# Patient Record
Sex: FEMALE | URBAN - METROPOLITAN AREA
[De-identification: names, ages, dates, MRNs, and addresses within clinical notes are randomized per-mention and may not be internally consistent; named-entity substitution may affect disease eponyms.]

---

## 2021-06-04 ENCOUNTER — TELEPHONE (OUTPATIENT)
Dept: ADMINISTRATIVE | Age: 71
End: 2021-06-04

## 2021-06-04 ENCOUNTER — NURSE TRIAGE (OUTPATIENT)
Dept: OTHER | Facility: CLINIC | Age: 71
End: 2021-06-04

## 2021-06-04 NOTE — TELEPHONE ENCOUNTER
[facility] [place] [id number] [facility]. Reason for Disposition   Information only question and nurse able to answer    Answer Assessment - Initial Assessment Questions  1. REASON FOR CALL or QUESTION: \"What is your reason for calling today? \" or \"How can I best help you? \" or \"What question do you have that I can help answer? \"      Pt calling stating she wanted to talk to a triage nurse regarding multiple chief complaints. She was provided this number by another health care provider. Pt does not have a PCP. When pt was stating her s/s she was concerned and wanted to be triaged for, pt was provided to symptoms of covid per cdc website:, fever or chills, Cough, Shortness of breath or difficulty breathing, Fatigue, muscle or body aches, Headache, New loss of taste or smell, Sore throat, Congestion or runny nose, Nausea or vomiting, Diarrhea. Pt states she lives in an isolated area and has been vaccinated and she has s/s more than list provided. Suggested pt to go to urgent care or walk in clinic for s/s if she is ill to be evaluated if she does not have a PCP and needs to be treated.     Protocols used: INFORMATION ONLY CALL - NO TRIAGE-ADULT-[place]

## 2021-06-04 NOTE — TELEPHONE ENCOUNTER
Pt called to establish as a new patient with Dr. Azul Soni. She is having pain in ears, along neck and jaw, worse this morning. [facility], but called her back and transferred her to our nurse triage line for assessment. Are you willing to take her on as a new patient? Please advise.

## 2021-06-07 ENCOUNTER — HOSPITAL ENCOUNTER (INPATIENT)
Age: 71
LOS: 10 days | Discharge: NURSING, REHAB OR LONG-TERM CARE | DRG: 233 | End: 2021-06-17
Attending: FAMILY MEDICINE | Admitting: THORACIC SURGERY (CARDIOTHORACIC VASCULAR SURGERY)
Payer: MEDICARE

## 2021-06-07 DIAGNOSIS — I25.10 CAD IN NATIVE ARTERY: ICD-10-CM

## 2021-06-07 DIAGNOSIS — Z95.1 S/P CABG (CORONARY ARTERY BYPASS GRAFT): ICD-10-CM

## 2021-06-07 DIAGNOSIS — G89.18 ACUTE POSTOPERATIVE PAIN: Primary | ICD-10-CM

## 2021-06-07 LAB
ABO/RH: NORMAL
ANTIBODY SCREEN: NORMAL
LV EF: 55 %
LVEF MODALITY: NORMAL

## 2021-06-07 PROCEDURE — B2111ZZ FLUOROSCOPY OF MULTIPLE CORONARY ARTERIES USING LOW OSMOLAR CONTRAST: ICD-10-PCS | Performed by: INTERNAL MEDICINE

## 2021-06-07 PROCEDURE — 2709999900 HC NON-CHARGEABLE SUPPLY

## 2021-06-07 PROCEDURE — P9016 RBC LEUKOCYTES REDUCED: HCPCS

## 2021-06-07 PROCEDURE — 86901 BLOOD TYPING SEROLOGIC RH(D): CPT

## 2021-06-07 PROCEDURE — 93458 L HRT ARTERY/VENTRICLE ANGIO: CPT | Performed by: INTERNAL MEDICINE

## 2021-06-07 PROCEDURE — 4A023N7 MEASUREMENT OF CARDIAC SAMPLING AND PRESSURE, LEFT HEART, PERCUTANEOUS APPROACH: ICD-10-PCS | Performed by: INTERNAL MEDICINE

## 2021-06-07 PROCEDURE — P9059 PLASMA, FRZ BETWEEN 8-24HOUR: HCPCS

## 2021-06-07 PROCEDURE — B2151ZZ FLUOROSCOPY OF LEFT HEART USING LOW OSMOLAR CONTRAST: ICD-10-PCS | Performed by: INTERNAL MEDICINE

## 2021-06-07 PROCEDURE — 86900 BLOOD TYPING SEROLOGIC ABO: CPT

## 2021-06-07 PROCEDURE — 2140000000 HC CCU INTERMEDIATE R&B

## 2021-06-07 PROCEDURE — C1894 INTRO/SHEATH, NON-LASER: HCPCS

## 2021-06-07 PROCEDURE — 36415 COLL VENOUS BLD VENIPUNCTURE: CPT

## 2021-06-07 PROCEDURE — 86923 COMPATIBILITY TEST ELECTRIC: CPT

## 2021-06-07 PROCEDURE — P9035 PLATELET PHERES LEUKOREDUCED: HCPCS

## 2021-06-07 PROCEDURE — 6360000002 HC RX W HCPCS

## 2021-06-07 PROCEDURE — 2500000003 HC RX 250 WO HCPCS

## 2021-06-07 PROCEDURE — 2580000003 HC RX 258: Performed by: FAMILY MEDICINE

## 2021-06-07 PROCEDURE — 86850 RBC ANTIBODY SCREEN: CPT

## 2021-06-07 PROCEDURE — C1769 GUIDE WIRE: HCPCS

## 2021-06-07 PROCEDURE — C1887 CATHETER, GUIDING: HCPCS

## 2021-06-07 PROCEDURE — 6370000000 HC RX 637 (ALT 250 FOR IP): Performed by: INTERNAL MEDICINE

## 2021-06-07 PROCEDURE — 2580000003 HC RX 258: Performed by: INTERNAL MEDICINE

## 2021-06-07 RX ORDER — SODIUM CHLORIDE 9 MG/ML
INJECTION, SOLUTION INTRAVENOUS ONCE
Status: COMPLETED | OUTPATIENT
Start: 2021-06-07 | End: 2021-06-07

## 2021-06-07 RX ORDER — ASPIRIN 81 MG/1
81 TABLET, CHEWABLE ORAL DAILY
Status: DISCONTINUED | OUTPATIENT
Start: 2021-06-08 | End: 2021-06-09

## 2021-06-07 RX ORDER — FAMOTIDINE 20 MG/1
20 TABLET, FILM COATED ORAL 2 TIMES DAILY
COMMUNITY
End: 2021-07-27 | Stop reason: SDUPTHER

## 2021-06-07 RX ORDER — ACETAMINOPHEN 325 MG/1
650 TABLET ORAL EVERY 4 HOURS PRN
Status: DISCONTINUED | OUTPATIENT
Start: 2021-06-07 | End: 2021-06-09

## 2021-06-07 RX ORDER — ATORVASTATIN CALCIUM 80 MG/1
80 TABLET, FILM COATED ORAL NIGHTLY
Status: DISCONTINUED | OUTPATIENT
Start: 2021-06-07 | End: 2021-06-14

## 2021-06-07 RX ORDER — MECOBALAMIN 5000 MCG
5 TABLET,DISINTEGRATING ORAL NIGHTLY PRN
Status: DISCONTINUED | OUTPATIENT
Start: 2021-06-07 | End: 2021-06-09

## 2021-06-07 RX ORDER — ASPIRIN 81 MG/1
81 TABLET, CHEWABLE ORAL DAILY
COMMUNITY

## 2021-06-07 RX ORDER — IBUPROFEN 200 MG
200 TABLET ORAL EVERY 6 HOURS PRN
COMMUNITY
End: 2021-07-27

## 2021-06-07 RX ORDER — SODIUM CHLORIDE 9 MG/ML
INJECTION, SOLUTION INTRAVENOUS CONTINUOUS
Status: ACTIVE | OUTPATIENT
Start: 2021-06-07 | End: 2021-06-08

## 2021-06-07 RX ORDER — ATORVASTATIN CALCIUM 20 MG/1
80 TABLET, FILM COATED ORAL DAILY
COMMUNITY
End: 2021-07-27 | Stop reason: SDUPTHER

## 2021-06-07 RX ORDER — FAMOTIDINE 20 MG/1
20 TABLET, FILM COATED ORAL 2 TIMES DAILY
Status: DISCONTINUED | OUTPATIENT
Start: 2021-06-07 | End: 2021-06-09

## 2021-06-07 RX ADMIN — SODIUM CHLORIDE: 9 INJECTION, SOLUTION INTRAVENOUS at 11:05

## 2021-06-07 RX ADMIN — METOPROLOL TARTRATE 25 MG: 25 TABLET, FILM COATED ORAL at 21:04

## 2021-06-07 RX ADMIN — ATORVASTATIN CALCIUM 80 MG: 80 TABLET, FILM COATED ORAL at 21:04

## 2021-06-07 RX ADMIN — FAMOTIDINE 20 MG: 20 TABLET ORAL at 21:04

## 2021-06-07 RX ADMIN — ACETAMINOPHEN 650 MG: 325 TABLET ORAL at 17:53

## 2021-06-07 RX ADMIN — SODIUM CHLORIDE: 9 INJECTION, SOLUTION INTRAVENOUS at 17:39

## 2021-06-07 RX ADMIN — SODIUM CHLORIDE: 9 INJECTION, SOLUTION INTRAVENOUS at 11:04

## 2021-06-07 ASSESSMENT — PAIN SCALES - GENERAL
PAINLEVEL_OUTOF10: 0
PAINLEVEL_OUTOF10: 3
PAINLEVEL_OUTOF10: 0

## 2021-06-07 NOTE — H&P
Hospitalist History & Physical      PCP: No primary care provider on file. Date of Admission: 6/7/2021    Date of Service: Pt seen/examined on 6/7/2021 and is  admitted to Inpatient with expected LOS greater than two midnights due to medical therapy. Chief Complaint: Exertional chest pain    History Of Present Illness:    Ms. Librado Ewing, a 78y.o. year old female  who  has a past medical history of CAD in native artery, COPD (chronic obstructive pulmonary disease) ([address] Hyperlipidemia, and Hypertension. Briefly this is a 70-year-old female who has been admitted status post cardiac catheterization at [facility]. She initially presented the morning of 6/5/2021 with complaints of exertional chest pain with radiation to neck/jaw which typically stopped after cessation of exertion. She had a 1 month history of this but it was more frequent and prolonged. There is no associated nausea, vomiting, diaphoresis. There is no recurrences of this since admission however her troponin was 1.6 at baseline and peaked at 3.1. Initial EKG showed sinus rhythm with subtle anterior and lateral ST depressions. Patient was admitted there and was transferred to [facility] for further evaluation and management. Patient had a cardiac cath by Dr. Palma Albrecht which reveals multivessel CAD that needs consideration for surgical bypass. Patient is being admitted for further evaluation. Patient reports she was a smoker but has not been smoking for the last 20 years. Patient is compliant with medical follow-up but has a history of hypertension, hyperlipidemia and COPD -  she reports that she takes Motrin pretty frequently for arthritis pain in her back and her knees. Patient's admission lab from 6/5 reveals normal CBC count, normal BMP. Lipid panel significant for LDL of 161.     Past Medical History:   Diagnosis Date    CAD in native artery 6/7/2021    COPD (chronic obstructive pulmonary disease) (HCC)     Hyperlipidemia     Hypertension        No past surgical history on file. Prior to Admission medications    Medication Sig Start Date End Date Taking? Authorizing Provider   atorvastatin (LIPITOR) 20 MG tablet Take 80 mg by mouth daily    Yes Historical Provider, MD   aspirin 81 MG chewable tablet Take 81 mg by mouth daily   Yes Historical Provider, MD   metoprolol tartrate (LOPRESSOR) 25 MG tablet Take 25 mg by mouth 2 times daily   Yes Historical Provider, MD   famotidine (PEPCID) 20 MG tablet Take 20 mg by mouth 2 times daily   Yes Historical Provider, MD   nitroglycerin (NITRO-BID) 2 % ointment Place 0.5 inches onto the skin every 6 hours   Yes Historical Provider, MD   ibuprofen (ADVIL;MOTRIN) 200 MG tablet Take 200 mg by mouth every 6 hours as needed for Pain (hips/back)   Yes Historical Provider, MD         Allergies:  Tramadol    Social History:    TOBACCO:   has no history on file for tobacco use. ETOH:   has no history on file for alcohol use. Family History:    Reviewed in detail and negative for DM, CAD, Cancer, CVA. Positive as follows\"  No family history on file. REVIEW OF SYSTEMS:   Pertinent positives as noted in the HPI. All other systems reviewed and negative. PHYSICAL EXAM:  BP (!) 151/83   Pulse 83   Temp 98 °F (36.7 °C) (Temporal)   Resp 16   Ht 5' 1.5\" (1.562 m)   Wt 105 lb (47.6 kg)   SpO2 96%   BMI 19.52 kg/m²   General appearance: No apparent distress, appears stated age and cooperative. HEENT: Normal cephalic, atraumatic without obvious deformity. Pupils equal, round, and reactive to light. Extra ocular muscles intact. Conjunctivae/corneas clear. Neck: Supple, with full range of motion. No jugular venous distention. Trachea midline. Respiratory:    Cardiovascular:    Abdomen:    Musculoskeletal: No clubbing, cyanosis, edema of bilateral lower extremities. Brisk capillary refill. Skin: Normal skin color.   No rashes or lesions. Neurologic:  Neurovascularly intact without any focal sensory/motor deficits. Cranial nerves: II-XII intact, grossly non-focal.  Psychiatric: Alert and oriented, thought content appropriate, normal insight    Reviewed EKG and CXR personally      CBC:   No results for input(s): WBC, RBC, HGB, HCT, MCV, RDW, PLT in the last 72 hours. BMP: No results for input(s): NA, K, CL, CO2, BUN, CREATININE, MG, PHOS in the last 72 hours. Invalid input(s): CA  LFT:  No results for input(s): PROT, ALB, ALKPHOS, ALT, AST, BILITOT, AMYLASE, LIPASE in the last 72 hours. CE:  No results for input(s): Eleni Lemme in the last 72 hours. PT/INR: No results for input(s): INR, APTT in the last 72 hours. BNP: No results for input(s): BNP in the last 72 hours. ESR: No results found for: SEDRATE  CRP: No results found for: CRP  D Dimer: No results found for: DDIMER   Folate and B12: No results found for: [id number], No results found for: FOLATE  Lactic Acid: No results found for: LACTA  Thyroid Studies: No results found for: TSH, U6GNJTE, M7BWNMT, THYROIDAB    Oupatient labs:  No results found for: CHOL, TRIG, HDL, LDLCALC, TSH, PSA, INR, LABA1C    Urinalysis:  No results found for: NITRU, WBCUA, BACTERIA, RBCUA, BLOODU, SPECGRAV, GLUCOSEU    Imaging:  No results found. ASSESSMENT:    Exertional chest pain  Multivessel CAD  Hypertension, hyperlipidemia  Extobacco smoker      PLAN:    Admit for further evaluation management  Check A1c. LDL reviewed from [place]. Consult was placed to CT surgery by interventionalist.  Patient would like to follow-up with [facility] cardiology. I conveyed this to [facility] cardiology personally. Light IV hydration for the next 24 hours. Check morning BMP  Antiplatelet, statin, beta-blocker      Diet: ADULT DIET; Regular;  No Added Salt (3-4 gm)  Code Status: Full Code  Surrogate decision maker confirmed with patient:   Extended Emergency Contact Information  Primary Emergency Contact: Cruce [address] Phone: [phone]  Relation: Spouse    DVT Prophylaxis: []Lovenox []Heparin []PCD [] [address] []Encouraged ambulation  Disposition: []Med/Surg [x] Intermediate [] ICU/CCU  Admit status: [] Observation [x] Inpatient     +++++++++++++++++++++++++++++++++++++++++++++++++  Velma Sauceda MD  [address]  +++++++++++++++++++++++++++++++++++++++++++++++++  NOTE: This report was transcribed using voice recognition software. Every effort was made to ensure accuracy; however, inadvertent computerized transcription errors may be present.

## 2021-06-07 NOTE — PROCEDURES
Procedure:    1. Left heart cath    Physician: Anthony Gerardo DO. Assistant: none    Indication: NSTEmi  AUC: 8  AUC indication: 4    Complication: None. Sedation: Intravenous Versed. Anesthesia: Xylocaine, fentanyl     Sedation time: I was present for sedation and ministration at 1217 and I ended sedation at 1241 for a total face-to-face sedation time of 24 minutes. Estimated blood loss: Minimal    Specimens: none    Contrast used: 52 cc    Hemodynamics:  Opening Aortic pressure: 783/29  LV systolic pressure: 98  LVEDP: 10  No significant gradient across the aortic valve    Angiographic Results/findings:  Left Main: Distal diffuse 50%. Moderately calcified. LAD: Severely tortuous. Ostial 75 to 80%, mid 50%  Cx: Severely tortuous. Proximal to mid diffuse 80%, distal diffuse 75 to 80%  OM1: Ostial 90%  OM2: Very small vessel. Ramus: Bifurcating vessel. Ostial to proximal diffuse 80%. Proximal mid diffuse 40%. There is an anterior branch ostial 70% and proximal diffuse 75%. The lateral branch bifurcation has an ostial 95% stenosis. RCA: Dominant. Heavily calcified. Tortuous. Mid diffuse 85 to 90%, distal bifurcation subtotal chronic occlusion  PDA: Ostial subtotal occlusion as part of the bifurcation subtotal occlusion. PLB: Ostial 95% as part of the bifurcation lesion. LV: Proximal inferior hypokinesis. Ejection fraction 55%    Procedure:   After obtaining informed consent the patient was taken to the cardiac Cath Lab where the area over the right radial artery was prepped and draped in a sterile fashion. Using ultrasound guidance and a micropuncture technique a 6 Romansh slender glide sheath was placed in the right radial artery. This was aspirated & flushed several times throughout the procedure. This was medicated with verapamil and nitroglycerin. They were given heparin systemically. A 5 [place] TIG catheter was advanced over a wire to the root of the aorta.   It was aspirated &

## 2021-06-08 ENCOUNTER — APPOINTMENT (OUTPATIENT)
Dept: INTERVENTIONAL RADIOLOGY/VASCULAR | Age: 71
DRG: 233 | End: 2021-06-08
Attending: FAMILY MEDICINE
Payer: MEDICARE

## 2021-06-08 ENCOUNTER — APPOINTMENT (OUTPATIENT)
Dept: CT IMAGING | Age: 71
DRG: 233 | End: 2021-06-08
Attending: FAMILY MEDICINE
Payer: MEDICARE

## 2021-06-08 ENCOUNTER — ANESTHESIA EVENT (OUTPATIENT)
Dept: OPERATING ROOM | Age: 71
DRG: 233 | End: 2021-06-08
Payer: MEDICARE

## 2021-06-08 ENCOUNTER — APPOINTMENT (OUTPATIENT)
Dept: ULTRASOUND IMAGING | Age: 71
DRG: 233 | End: 2021-06-08
Attending: FAMILY MEDICINE
Payer: MEDICARE

## 2021-06-08 LAB
ALBUMIN SERPL-MCNC: 4.2 G/DL (ref 3.5–5.2)
ALP BLD-CCNC: 130 U/L (ref 35–104)
ALT SERPL-CCNC: 73 U/L (ref 0–32)
ANION GAP SERPL CALCULATED.3IONS-SCNC: 11 MMOL/L (ref 7–16)
ANION GAP SERPL CALCULATED.3IONS-SCNC: 16 MMOL/L (ref 7–16)
APTT: 23.3 SEC (ref 24.5–35.1)
AST SERPL-CCNC: 82 U/L (ref 0–31)
BACTERIA: NORMAL /HPF
BILIRUB SERPL-MCNC: 0.4 MG/DL (ref 0–1.2)
BILIRUBIN URINE: NEGATIVE
BLOOD, URINE: NORMAL
BUN BLDV-MCNC: 14 MG/DL (ref 6–23)
BUN BLDV-MCNC: 15 MG/DL (ref 6–23)
CALCIUM SERPL-MCNC: 9.3 MG/DL (ref 8.6–10.2)
CALCIUM SERPL-MCNC: 9.5 MG/DL (ref 8.6–10.2)
CHLORIDE BLD-SCNC: 104 MMOL/L (ref 98–107)
CHLORIDE BLD-SCNC: 107 MMOL/L (ref 98–107)
CLARITY: CLEAR
CO2: 20 MMOL/L (ref 22–29)
CO2: 22 MMOL/L (ref 22–29)
COLOR: YELLOW
CREAT SERPL-MCNC: 0.8 MG/DL (ref 0.5–1)
CREAT SERPL-MCNC: 0.8 MG/DL (ref 0.5–1)
GFR AFRICAN AMERICAN: >60
GFR AFRICAN AMERICAN: >60
GFR NON-AFRICAN AMERICAN: >60 ML/MIN/1.73
GFR NON-AFRICAN AMERICAN: >60 ML/MIN/1.73
GLUCOSE BLD-MCNC: 106 MG/DL (ref 74–99)
GLUCOSE BLD-MCNC: 112 MG/DL (ref 74–99)
GLUCOSE URINE: NEGATIVE MG/DL
HBA1C MFR BLD: 6 % (ref 4–5.6)
HCT VFR BLD CALC: 42.2 % (ref 34–48)
HEMOGLOBIN: 13.3 G/DL (ref 11.5–15.5)
INR BLD: 1
KETONES, URINE: NEGATIVE MG/DL
LEUKOCYTE ESTERASE, URINE: NEGATIVE
MCH RBC QN AUTO: 28.5 PG (ref 26–35)
MCHC RBC AUTO-ENTMCNC: 31.5 % (ref 32–34.5)
MCV RBC AUTO: 90.4 FL (ref 80–99.9)
NITRITE, URINE: NEGATIVE
PDW BLD-RTO: 14.1 FL (ref 11.5–15)
PH UA: 5 (ref 5–9)
PLATELET # BLD: 255 E9/L (ref 130–450)
PMV BLD AUTO: 11.5 FL (ref 7–12)
POTASSIUM REFLEX MAGNESIUM: 4.1 MMOL/L (ref 3.5–5)
POTASSIUM SERPL-SCNC: 4.2 MMOL/L (ref 3.5–5)
PROTEIN UA: NEGATIVE MG/DL
PROTHROMBIN TIME: 11 SEC (ref 9.3–12.4)
RBC # BLD: 4.67 E12/L (ref 3.5–5.5)
RBC UA: NORMAL /HPF (ref 0–2)
SODIUM BLD-SCNC: 140 MMOL/L (ref 132–146)
SODIUM BLD-SCNC: 140 MMOL/L (ref 132–146)
SPECIFIC GRAVITY UA: 1.01 (ref 1–1.03)
TOTAL PROTEIN: 7.4 G/DL (ref 6.4–8.3)
TROPONIN, HIGH SENSITIVITY: 145 NG/L (ref 0–9)
UROBILINOGEN, URINE: 0.2 E.U./DL
WBC # BLD: 11.7 E9/L (ref 4.5–11.5)
WBC UA: NORMAL /HPF (ref 0–5)

## 2021-06-08 PROCEDURE — 94375 RESPIRATORY FLOW VOLUME LOOP: CPT

## 2021-06-08 PROCEDURE — 71250 CT THORAX DX C-: CPT

## 2021-06-08 PROCEDURE — 84484 ASSAY OF TROPONIN QUANT: CPT

## 2021-06-08 PROCEDURE — 93922 UPR/L XTREMITY ART 2 LEVELS: CPT

## 2021-06-08 PROCEDURE — 80048 BASIC METABOLIC PNL TOTAL CA: CPT

## 2021-06-08 PROCEDURE — 36415 COLL VENOUS BLD VENIPUNCTURE: CPT

## 2021-06-08 PROCEDURE — 2140000000 HC CCU INTERMEDIATE R&B

## 2021-06-08 PROCEDURE — 6370000000 HC RX 637 (ALT 250 FOR IP): Performed by: INTERNAL MEDICINE

## 2021-06-08 PROCEDURE — 85730 THROMBOPLASTIN TIME PARTIAL: CPT

## 2021-06-08 PROCEDURE — 87081 CULTURE SCREEN ONLY: CPT

## 2021-06-08 PROCEDURE — 85610 PROTHROMBIN TIME: CPT

## 2021-06-08 PROCEDURE — 94729 DIFFUSING CAPACITY: CPT

## 2021-06-08 PROCEDURE — 87088 URINE BACTERIA CULTURE: CPT

## 2021-06-08 PROCEDURE — 99222 1ST HOSP IP/OBS MODERATE 55: CPT | Performed by: THORACIC SURGERY (CARDIOTHORACIC VASCULAR SURGERY)

## 2021-06-08 PROCEDURE — 85027 COMPLETE CBC AUTOMATED: CPT

## 2021-06-08 PROCEDURE — 93880 EXTRACRANIAL BILAT STUDY: CPT

## 2021-06-08 PROCEDURE — 81001 URINALYSIS AUTO W/SCOPE: CPT

## 2021-06-08 PROCEDURE — 2580000003 HC RX 258: Performed by: PHYSICIAN ASSISTANT

## 2021-06-08 PROCEDURE — 6370000000 HC RX 637 (ALT 250 FOR IP): Performed by: PHYSICIAN ASSISTANT

## 2021-06-08 PROCEDURE — 93880 EXTRACRANIAL BILAT STUDY: CPT | Performed by: RADIOLOGY

## 2021-06-08 PROCEDURE — 83036 HEMOGLOBIN GLYCOSYLATED A1C: CPT

## 2021-06-08 PROCEDURE — 80053 COMPREHEN METABOLIC PANEL: CPT

## 2021-06-08 RX ORDER — CHLORHEXIDINE GLUCONATE 0.12 MG/ML
15 RINSE ORAL ONCE
Status: COMPLETED | OUTPATIENT
Start: 2021-06-09 | End: 2021-06-09

## 2021-06-08 RX ORDER — CHLORHEXIDINE GLUCONATE 4 G/100ML
SOLUTION TOPICAL SEE ADMIN INSTRUCTIONS
Status: DISCONTINUED | OUTPATIENT
Start: 2021-06-08 | End: 2021-06-09

## 2021-06-08 RX ORDER — SODIUM CHLORIDE 9 MG/ML
25 INJECTION, SOLUTION INTRAVENOUS PRN
Status: DISCONTINUED | OUTPATIENT
Start: 2021-06-08 | End: 2021-06-09

## 2021-06-08 RX ORDER — SODIUM CHLORIDE 0.9 % (FLUSH) 0.9 %
10 SYRINGE (ML) INJECTION PRN
Status: DISCONTINUED | OUTPATIENT
Start: 2021-06-08 | End: 2021-06-09

## 2021-06-08 RX ORDER — SODIUM CHLORIDE 0.9 % (FLUSH) 0.9 %
5-40 SYRINGE (ML) INJECTION EVERY 12 HOURS SCHEDULED
Status: DISCONTINUED | OUTPATIENT
Start: 2021-06-08 | End: 2021-06-09

## 2021-06-08 RX ADMIN — FAMOTIDINE 20 MG: 20 TABLET ORAL at 20:22

## 2021-06-08 RX ADMIN — FAMOTIDINE 20 MG: 20 TABLET ORAL at 11:20

## 2021-06-08 RX ADMIN — METOPROLOL TARTRATE 25 MG: 25 TABLET, FILM COATED ORAL at 11:22

## 2021-06-08 RX ADMIN — ATORVASTATIN CALCIUM 80 MG: 80 TABLET, FILM COATED ORAL at 20:21

## 2021-06-08 RX ADMIN — METOPROLOL TARTRATE 25 MG: 25 TABLET, FILM COATED ORAL at 20:22

## 2021-06-08 RX ADMIN — MUPIROCIN: 20 OINTMENT TOPICAL at 21:13

## 2021-06-08 RX ADMIN — SODIUM CHLORIDE, PRESERVATIVE FREE 10 ML: 5 INJECTION INTRAVENOUS at 20:22

## 2021-06-08 RX ADMIN — CHLORHEXIDINE GLUCONATE: 213 SOLUTION TOPICAL at 20:22

## 2021-06-08 RX ADMIN — ASPIRIN 81 MG CHEWABLE TABLET 81 MG: 81 TABLET CHEWABLE at 11:20

## 2021-06-08 ASSESSMENT — PAIN SCALES - GENERAL
PAINLEVEL_OUTOF10: 0

## 2021-06-08 ASSESSMENT — COPD QUESTIONNAIRES: CAT_SEVERITY: MILD

## 2021-06-08 ASSESSMENT — ENCOUNTER SYMPTOMS: SHORTNESS OF BREATH: 1

## 2021-06-08 NOTE — PROGRESS NOTES
Hospitalist Progress Note      SYNOPSIS: Patient admitted on 2021 for unstable angina. Cardiac cath on  revealed multivessel CAD needing surgical revascularization. SUBJECTIVE:    Patient seen and examined  Records reviewed. Pleasant  No new complaints  Refused nitro patch due to headaches    Stable overnight. No other overnight issues reported. Temp (24hrs), Av.1 °F (36.7 °C), Min:97.8 °F (36.6 °C), Max:98.6 °F (37 °C)    DIET: ADULT DIET; Regular; No Added Salt (3-4 gm)  Diet NPO  CODE: Full Code    Intake/Output Summary (Last 24 hours) at 2021 1020  Last data filed at 2021 0844  Gross per 24 hour   Intake 1529.51 ml   Output --   Net 1529.51 ml       OBJECTIVE:    BP (!) 120/57   Pulse 84   Temp 98.6 °F (37 °C) (Temporal)   Resp 16   Ht 5' 1.5\" (1.562 m)   Wt 105 lb (47.6 kg)   SpO2 97%   BMI 19.52 kg/m²     General appearance: No apparent distress, appears stated age and cooperative. HEENT:  Conjunctivae/corneas clear. Neck: Supple. No jugular venous distention. Respiratory: Clear to auscultation bilaterally, normal respiratory effort  Cardiovascular: Regular rate rhythm, normal S1-S2  Abdomen: Soft, nontender, nondistended  Musculoskeletal: No clubbing, cyanosis, no bilateral lower extremity edema. Brisk capillary refill. Skin:  No rashes  on visible skin  Neurologic: awake, alert and following commands     ASSESSMENT:    Exertional chest pain  Multivessel CAD  Hypertension, hyperlipidemia  Extobacco smoker  Prediabetes and A1c of 6.0  Hyperlipidemia         PLAN:    CT surgery consult pending  [id number] [facility] cardiology consult pending  Continue IV fluids. Follow morning BMP  Antiplatelet, statin, beta-blocker  Addressed questions and concerns about cardiothoracic surgery.   I requested some questions be directed to the surgery team.    DISPOSITION: Pending further course    Medications:  REVIEWED DAILY    Infusion Medications    sodium chloride      sodium chloride 75 mL/hr at 06/07/21 1739     Scheduled Medications    sodium chloride flush  5-40 mL Intravenous 2 times per day    ceFAZolin (ANCEF) IVPB  2,000 mg Intravenous See Admin Instructions    mupirocin   Nasal BID    [START ON 6/9/2021] chlorhexidine  15 mL Mouth/Throat Once    chlorhexidine   Topical See Admin Instructions    atorvastatin  80 mg Oral Nightly    aspirin  81 mg Oral Daily    metoprolol tartrate  25 mg Oral BID    famotidine  20 mg Oral BID    nitroglycerin  0.5 inch Topical Q6H     PRN Meds: perflutren lipid microspheres, sodium chloride flush, sodium chloride, acetaminophen, melatonin    Labs:     No results for input(s): WBC, HGB, HCT, PLT in the last 72 hours. Recent Labs     06/08/21  0547      K 4.1      CO2 22   BUN 15   CREATININE 0.8   CALCIUM 9.3       No results for input(s): PROT, ALB, ALKPHOS, ALT, AST, BILITOT, AMYLASE, LIPASE in the last 72 hours. No results for input(s): INR in the last 72 hours. No results for input(s): [place] in the last 72 hours. Chronic labs:    Lab Results   Component Value Date    LABA1C 6.0 (H) 06/08/2021       Radiology: REVIEWED DAILY    +++++++++++++++++++++++++++++++++++++++++++++++++  Mike Wyatt MD  [facility] Physician - [address] [place] [address]  +++++++++++++++++++++++++++++++++++++++++++++++++  NOTE: This report was transcribed using voice recognition software. Every effort was made to ensure accuracy; however, inadvertent computerized transcription errors may be present.

## 2021-06-08 NOTE — PROGRESS NOTES
Message left on [facility]CHO voice mail that pt is for 0700 open heart surgery 6-9-2021. Test needs to be done asap.

## 2021-06-08 NOTE — CARE COORDINATION
Met with pt and  in the room. Role of  and transition of care discussed. Pt lives with her  in a 2 story home. She is independent, drives, uses no DME ( they have a walker and SC from  after knee sx) Questions re: living will, POA and DNR answered. Offered to print paperwork for pt. Declined states she has paperwork already. She prefers to think about the information provided and will complete at home. Pt plans to return home after discharge. She has no PCP. She prefers the [facility] office. Attempted to arrange an appointment with Jered Joel in the [facility] [employer] office. Per , unable to make an apt until pt is discharged. [facility].

## 2021-06-08 NOTE — PROGRESS NOTES
[facility] [address]    Name: Madelyn Hou    Age: 78 y.o. PCP: No primary care provider on file. Date of Admission: 6/7/2021  1:23 PM    Date of Service: 6/8/2021    Chief Complaint: Follow-up for CAD    Interim History:  No new overnight cardiac complaints. Currently with no complaints of CP, SOB, palpitations, dizziness, or lightheadedness. Sent by Dr Arline Rivero for cath from [place]. Was informed yesterday she wanted to follow with [facility], but placed on my list today. Did have multivessel disease and for CABG tomorrow wti Dr Rolan Downing. No issues from cath. Telemetry personally reviewed and showed sinus      Review of Systems:   Cardiac: As per HPI  General: No fever, chills  Pulmonary: No cough, wheeze, or shortness of breath  GI: No nausea, vomiting,or abdominal pain  Neuro: No headache or confusion    Problem List:  Active Problems:    CAD in native artery  Resolved Problems:    * No resolved hospital problems. *      Past Medical History:  Past Medical History:   Diagnosis Date    CAD in native artery 6/7/2021    COPD (chronic obstructive pulmonary disease) (HCC)     Hyperlipidemia     Hypertension        Allergies:   Allergies   Allergen Reactions    Tramadol Nausea Only       Current Medications:  Current Facility-Administered Medications   Medication Dose Route Frequency Provider Last Rate Last Admin    perflutren lipid microspheres (DEFINITY) injection 1.65 mg  1.5 mL Intravenous ONCE PRN ELLIS Nevarez        sodium chloride flush 0.9 % injection 5-40 mL  5-40 mL Intravenous 2 times per day Francisco Sepulveda, [address]        sodium chloride flush 0.9 % injection 10 mL  10 mL Intravenous PRN ELLIS Medrano        0.9 % sodium chloride infusion  25 mL Intravenous PRN ELLIS Medrano        ceFAZolin (ANCEF) 2000 mg in sterile water 20 mL IV syringe  2,000 mg Intravenous See Admin Instructions ELLIS Medrano        mupirocin (BACTROBAN) 2 % ointment   Nasal BID ELLIS Rice        [START ON 6/9/2021] chlorhexidine (PERIDEX) 0.12 % solution 15 mL  15 mL Mouth/Throat Once Hal Rice        chlorhexidine (HIBICLENS) 4 % liquid   Topical See Admin Instructions Hal Rice        acetaminophen (TYLENOL) tablet 650 mg  650 mg Oral Q4H PRN Lenoria Foot, DO   650 mg at 06/07/21 1753    atorvastatin (LIPITOR) tablet 80 mg  80 mg Oral Nightly Lenoria Foot, DO   80 mg at 06/07/21 2104    aspirin chewable tablet 81 mg  81 mg Oral Daily Lenoria Foot, DO   81 mg at 06/08/21 1120    metoprolol tartrate (LOPRESSOR) tablet 25 mg  25 mg Oral BID ELLIS Rice   25 mg at 06/08/21 1122    famotidine (PEPCID) tablet 20 mg  20 mg Oral BID Lenoria Foot, DO   20 mg at 06/08/21 1120    nitroglycerin (NITRO-BID) 2 % ointment 0.5 inch  0.5 inch Topical Q6H Lenoria Foot, DO        0.9 % sodium chloride infusion   Intravenous Continuous Haresh Maxwell MD 75 mL/hr at 06/07/21 1739 New Bag at 06/07/21 1739    melatonin disintegrating tablet 5 mg  5 mg Oral Nightly PRN Haresh Maxwell MD          sodium chloride      sodium chloride 75 mL/hr at 06/07/21 1739       Physical Exam:  BP (!) 155/85   Pulse 95   Temp 98 °F (36.7 °C) (Temporal)   Resp 16   Ht 5' 1.5\" (1.562 m)   Wt 105 lb (47.6 kg)   SpO2 99%   BMI 19.52 kg/m²   Weight change: Wt Readings from Last 3 Encounters:   06/07/21 105 lb (47.6 kg)     General: Awake, alert, oriented x3, no acute distress  Neck: No JVD, thyromegaly,  CV: RRR  Resp: clear unlabored respirations  Abdomen:  nondistended,   Extremities: no cyanosis, clubbing, or edema.  Right radial cath site intact  Skin: Warm and dry, no rashes or lesions  Neuro: moves all extremities to command, no focal deficits noted    Intake/Output:    Intake/Output Summary (Last 24 hours) at 6/8/2021 1356  Last data filed at 6/8/2021 1350  Gross per 24 hour   Intake 1769.51 ml   Output --   Net 1769.51 ml     I/O

## 2021-06-09 ENCOUNTER — APPOINTMENT (OUTPATIENT)
Dept: GENERAL RADIOLOGY | Age: 71
DRG: 233 | End: 2021-06-09
Attending: FAMILY MEDICINE
Payer: MEDICARE

## 2021-06-09 ENCOUNTER — ANESTHESIA (OUTPATIENT)
Dept: OPERATING ROOM | Age: 71
DRG: 233 | End: 2021-06-09
Payer: MEDICARE

## 2021-06-09 ENCOUNTER — ANESTHESIA EVENT (OUTPATIENT)
Dept: OPERATING ROOM | Age: 71
DRG: 233 | End: 2021-06-09
Payer: MEDICARE

## 2021-06-09 VITALS — OXYGEN SATURATION: 96 % | RESPIRATION RATE: 5 BRPM | TEMPERATURE: 98.6 F

## 2021-06-09 VITALS
DIASTOLIC BLOOD PRESSURE: 51 MMHG | TEMPERATURE: 97.7 F | OXYGEN SATURATION: 100 % | SYSTOLIC BLOOD PRESSURE: 95 MMHG | RESPIRATION RATE: 11 BRPM

## 2021-06-09 LAB
AADO2: 209.6 MMHG
AADO2: 212.7 MMHG
AADO2: 241.3 MMHG
AADO2: 244.5 MMHG
AADO2: 256.6 MMHG
ACTIVATED CLOTTING TIME: 109 SECONDS (ref 99–130)
ACTIVATED CLOTTING TIME: 138 SECONDS (ref 99–130)
ACTIVATED CLOTTING TIME: 198 SECONDS (ref 99–130)
ACTIVATED CLOTTING TIME: 457 SECONDS (ref 99–130)
ACTIVATED CLOTTING TIME: 469 SECONDS (ref 99–130)
ACTIVATED CLOTTING TIME: 531 SECONDS (ref 99–130)
ACTIVATED CLOTTING TIME: 532 SECONDS (ref 99–130)
ANION GAP SERPL CALCULATED.3IONS-SCNC: 14 MMOL/L (ref 7–16)
ANION GAP SERPL CALCULATED.3IONS-SCNC: 8 MMOL/L (ref 7–16)
APTT: 33.9 SEC (ref 24.5–35.1)
B.E.: -0.5 MMOL/L (ref -3–3)
B.E.: -0.6 MMOL/L (ref -3–3)
B.E.: -1.2 MMOL/L (ref -3–3)
B.E.: -2.3 MMOL/L (ref -3–0)
B.E.: -2.4 MMOL/L (ref -3–3)
B.E.: -2.8 MMOL/L (ref -3–0)
B.E.: -3.1 MMOL/L (ref -3–0)
B.E.: -4.4 MMOL/L (ref -3–0)
B.E.: -5.4 MMOL/L (ref -3–3)
B.E.: -8.1 MMOL/L (ref -3–3)
BLOOD BANK DISPENSE STATUS: NORMAL
BLOOD BANK PRODUCT CODE: NORMAL
BPU ID: NORMAL
BUN BLDV-MCNC: 12 MG/DL (ref 6–23)
BUN BLDV-MCNC: 12 MG/DL (ref 6–23)
CALCIUM SERPL-MCNC: 7.8 MG/DL (ref 8.6–10.2)
CALCIUM SERPL-MCNC: 8.6 MG/DL (ref 8.6–10.2)
CARDIOPULMONARY BYPASS: YES
CHLORIDE BLD-SCNC: 107 MMOL/L (ref 98–107)
CHLORIDE BLD-SCNC: 108 MMOL/L (ref 98–107)
CO2: 23 MMOL/L (ref 22–29)
CO2: 24 MMOL/L (ref 22–29)
COHB: 0 % (ref 0–1.5)
COHB: 0.1 % (ref 0–1.5)
COHB: 0.3 % (ref 0–1.5)
CREAT SERPL-MCNC: 0.8 MG/DL (ref 0.5–1)
CREAT SERPL-MCNC: 0.9 MG/DL (ref 0.5–1)
CRITICAL: ABNORMAL
DATE ANALYZED: ABNORMAL
DATE OF COLLECTION: ABNORMAL
DESCRIPTION BLOOD BANK: NORMAL
DEVICE: ABNORMAL
FIO2 ARTERIAL: 60
FIO2 ARTERIAL: 70
FIO2 ARTERIAL: 80
FIO2: 50 %
FIO2: 50 %
FIO2: 60 %
GFR AFRICAN AMERICAN: >60
GFR AFRICAN AMERICAN: >60
GFR NON-AFRICAN AMERICAN: >60 ML/MIN/1.73
GFR NON-AFRICAN AMERICAN: >60 ML/MIN/1.73
GLUCOSE BLD-MCNC: 129 MG/DL (ref 74–99)
GLUCOSE BLD-MCNC: 220 MG/DL (ref 74–99)
HCO3 ARTERIAL: 21.2 MMOL/L (ref 22–26)
HCO3 ARTERIAL: 21.9 MMOL/L (ref 22–26)
HCO3 ARTERIAL: 22.3 MMOL/L (ref 22–26)
HCO3 ARTERIAL: 22.3 MMOL/L (ref 22–26)
HCO3: 18.2 MMOL/L (ref 22–26)
HCO3: 21.3 MMOL/L (ref 22–26)
HCO3: 23.1 MMOL/L (ref 22–26)
HCO3: 23.8 MMOL/L (ref 22–26)
HCO3: 24.2 MMOL/L (ref 22–26)
HCO3: 24.4 MMOL/L (ref 22–26)
HCT (EST): 22 % (ref 34–48)
HCT (EST): 22 % (ref 34–48)
HCT (EST): 24 % (ref 34–48)
HCT (EST): 33 % (ref 34–48)
HCT VFR BLD CALC: 22.2 % (ref 34–48)
HCT VFR BLD CALC: 29 % (ref 34–48)
HCT VFR BLD CALC: 33.1 % (ref 34–48)
HEMOGLOBIN: 10.7 G/DL (ref 11.5–15.5)
HEMOGLOBIN: 7 G/DL (ref 11.5–15.5)
HEMOGLOBIN: 9.3 G/DL (ref 11.5–15.5)
HGB, (EST): 11.3 G/DL (ref 11.5–15.5)
HGB, (EST): 7.5 G/DL (ref 11.5–15.5)
HGB, (EST): 7.6 G/DL (ref 11.5–15.5)
HGB, (EST): 8.1 G/DL (ref 11.5–15.5)
HHB: 2.4 % (ref 0–5)
HHB: 2.8 % (ref 0–5)
HHB: 3.1 % (ref 0–5)
HHB: 4.3 % (ref 0–5)
HHB: 4.5 % (ref 0–5)
HHB: 9.4 % (ref 0–5)
INR BLD: 1.4
LAB: ABNORMAL
LACTIC ACID: 2.5 MMOL/L (ref 0.5–2.2)
Lab: ABNORMAL
MAGNESIUM: 2.4 MG/DL (ref 1.6–2.6)
MCH RBC QN AUTO: 28.2 PG (ref 26–35)
MCH RBC QN AUTO: 28.7 PG (ref 26–35)
MCH RBC QN AUTO: 29.2 PG (ref 26–35)
MCHC RBC AUTO-ENTMCNC: 31.5 % (ref 32–34.5)
MCHC RBC AUTO-ENTMCNC: 32.1 % (ref 32–34.5)
MCHC RBC AUTO-ENTMCNC: 32.3 % (ref 32–34.5)
MCV RBC AUTO: 88.7 FL (ref 80–99.9)
MCV RBC AUTO: 89.5 FL (ref 80–99.9)
MCV RBC AUTO: 91.2 FL (ref 80–99.9)
METER GLUCOSE: 111 MG/DL (ref 74–99)
METER GLUCOSE: 111 MG/DL (ref 74–99)
METER GLUCOSE: 131 MG/DL (ref 74–99)
METER GLUCOSE: 138 MG/DL (ref 74–99)
METER GLUCOSE: 214 MG/DL (ref 74–99)
METER GLUCOSE: 214 MG/DL (ref 74–99)
METER GLUCOSE: 230 MG/DL (ref 74–99)
METER GLUCOSE: 250 MG/DL (ref 74–99)
METHB: 0.2 % (ref 0–1.5)
METHB: 0.2 % (ref 0–1.5)
METHB: 0.3 % (ref 0–1.5)
METHB: 0.4 % (ref 0–1.5)
METHB: 0.5 % (ref 0–1.5)
METHB: 0.6 % (ref 0–1.5)
MODE: ABNORMAL
MODE: AC
MODE: AC
O2 SATURATION: 100 % (ref 92–98.5)
O2 SATURATION: 100 % (ref 92–98.5)
O2 SATURATION: 84.2 % (ref 92–98.5)
O2 SATURATION: 90.5 % (ref 92–98.5)
O2 SATURATION: 95.5 % (ref 92–98.5)
O2 SATURATION: 95.7 % (ref 92–98.5)
O2 SATURATION: 96.9 % (ref 92–98.5)
O2 SATURATION: 97.2 % (ref 92–98.5)
O2 SATURATION: 97.6 % (ref 92–98.5)
O2 SATURATION: 99.8 % (ref 92–98.5)
O2HB: 89.7 % (ref 94–97)
O2HB: 94.9 % (ref 94–97)
O2HB: 95.3 % (ref 94–97)
O2HB: 96.4 % (ref 94–97)
O2HB: 96.6 % (ref 94–97)
O2HB: 97.1 % (ref 94–97)
OPERATOR ID: 1926
OPERATOR ID: 4510
OPERATOR ID: ABNORMAL
PATIENT TEMP: 37 C
PCO2 ARTERIAL: 30.4 MMHG (ref 35–45)
PCO2 ARTERIAL: 36.5 MMHG (ref 35–45)
PCO2 ARTERIAL: 40.8 MMHG (ref 35–45)
PCO2 ARTERIAL: 46.6 MMHG (ref 35–45)
PCO2: 40.2 MMHG (ref 35–45)
PCO2: 40.5 MMHG (ref 35–45)
PCO2: 40.8 MMHG (ref 35–45)
PCO2: 40.9 MMHG (ref 35–45)
PCO2: 42.8 MMHG (ref 35–45)
PCO2: 46.5 MMHG (ref 35–45)
PDW BLD-RTO: 14.3 FL (ref 11.5–15)
PDW BLD-RTO: 14.4 FL (ref 11.5–15)
PDW BLD-RTO: 14.5 FL (ref 11.5–15)
PEEP/CPAP: 5 CMH2O
PEEP/CPAP: 5 CMH2O
PEEP/CPAP: 6 CMH2O
PFO2: 1.66 MMHG/%
PFO2: 1.78 MMHG/%
PFO2: 1.86 MMHG/%
PFO2: 1.95 MMHG/%
PFO2: 2.11 MMHG/%
PH BLOOD GAS: 7.27 (ref 7.35–7.45)
PH BLOOD GAS: 7.28 (ref 7.35–7.45)
PH BLOOD GAS: 7.29 (ref 7.35–7.45)
PH BLOOD GAS: 7.35 (ref 7.35–7.45)
PH BLOOD GAS: 7.35 (ref 7.35–7.45)
PH BLOOD GAS: 7.38 (ref 7.35–7.45)
PH BLOOD GAS: 7.39 (ref 7.35–7.45)
PH BLOOD GAS: 7.39 (ref 7.35–7.45)
PH BLOOD GAS: 7.4 (ref 7.35–7.45)
PH BLOOD GAS: 7.45 (ref 7.35–7.45)
PLATELET # BLD: 87 E9/L (ref 130–450)
PLATELET # BLD: 90 E9/L (ref 130–450)
PLATELET # BLD: 96 E9/L (ref 130–450)
PLATELET CONFIRMATION: NORMAL
PLATELET CONFIRMATION: NORMAL
PMV BLD AUTO: 10.4 FL (ref 7–12)
PMV BLD AUTO: 10.6 FL (ref 7–12)
PMV BLD AUTO: 10.7 FL (ref 7–12)
PO2 ARTERIAL: 228.5 MMHG (ref 60–80)
PO2 ARTERIAL: 367.7 MMHG (ref 60–80)
PO2 ARTERIAL: 534.6 MMHG (ref 60–80)
PO2 ARTERIAL: 55 MMHG (ref 60–80)
PO2: 111.7 MMHG (ref 75–100)
PO2: 117.2 MMHG (ref 75–100)
PO2: 126.6 MMHG (ref 75–100)
PO2: 59.4 MMHG (ref 75–100)
PO2: 83.2 MMHG (ref 75–100)
PO2: 89.2 MMHG (ref 75–100)
POTASSIUM SERPL-SCNC: 3.76 MMOL/L (ref 3.5–5)
POTASSIUM SERPL-SCNC: 3.8 MMOL/L (ref 3.5–5)
POTASSIUM SERPL-SCNC: 3.9 MMOL/L (ref 3.5–5)
POTASSIUM SERPL-SCNC: 3.9 MMOL/L (ref 3.5–5.5)
POTASSIUM SERPL-SCNC: 4.03 MMOL/L (ref 3.5–5)
POTASSIUM SERPL-SCNC: 4.05 MMOL/L (ref 3.5–5)
POTASSIUM SERPL-SCNC: 4.09 MMOL/L (ref 3.5–5)
POTASSIUM SERPL-SCNC: 4.11 MMOL/L (ref 3.5–5)
POTASSIUM SERPL-SCNC: 4.41 MMOL/L (ref 3.5–5)
POTASSIUM SERPL-SCNC: 4.9 MMOL/L (ref 3.5–5.5)
POTASSIUM SERPL-SCNC: 4.9 MMOL/L (ref 3.5–5.5)
PROTHROMBIN TIME: 15.6 SEC (ref 9.3–12.4)
PS: 10 CMH20
PS: 10 CMH20
PS: 8 CMH20
RBC # BLD: 2.48 E12/L (ref 3.5–5.5)
RBC # BLD: 3.18 E12/L (ref 3.5–5.5)
RBC # BLD: 3.73 E12/L (ref 3.5–5.5)
RI(T): 1.91
RI(T): 2.09
RI(T): 2.3
RI(T): 2.35
RI(T): 2.56
RR MECHANICAL: 12 B/MIN
RR MECHANICAL: 12 B/MIN
RR MECHANICAL: 16 B/MIN
RR MECHANICAL: 16 B/MIN
SODIUM BLD-SCNC: 138 MMOL/L (ref 132–146)
SODIUM BLD-SCNC: 146 MMOL/L (ref 132–146)
SOURCE, BLOOD GAS: ABNORMAL
THB: 10.4 G/DL (ref 11.5–16.5)
THB: 10.4 G/DL (ref 11.5–16.5)
THB: 8.1 G/DL (ref 11.5–16.5)
THB: 8.3 G/DL (ref 11.5–16.5)
THB: 8.5 G/DL (ref 11.5–16.5)
THB: 9.6 G/DL (ref 11.5–16.5)
TIME ANALYZED: 1313
TIME ANALYZED: 1416
TIME ANALYZED: 1546
TIME ANALYZED: 1643
TIME ANALYZED: 1809
TIME ANALYZED: 2248
VT MECHANICAL: 375 ML
VT MECHANICAL: 450 ML
WBC # BLD: 11.5 E9/L (ref 4.5–11.5)
WBC # BLD: 13.1 E9/L (ref 4.5–11.5)
WBC # BLD: 8.5 E9/L (ref 4.5–11.5)

## 2021-06-09 PROCEDURE — 06BQ4ZZ EXCISION OF LEFT SAPHENOUS VEIN, PERCUTANEOUS ENDOSCOPIC APPROACH: ICD-10-PCS | Performed by: THORACIC SURGERY (CARDIOTHORACIC VASCULAR SURGERY)

## 2021-06-09 PROCEDURE — 51702 INSERT TEMP BLADDER CATH: CPT

## 2021-06-09 PROCEDURE — 3700000000 HC ANESTHESIA ATTENDED CARE: Performed by: THORACIC SURGERY (CARDIOTHORACIC VASCULAR SURGERY)

## 2021-06-09 PROCEDURE — 35820 EXPLORE CHEST VESSELS: CPT | Performed by: THORACIC SURGERY (CARDIOTHORACIC VASCULAR SURGERY)

## 2021-06-09 PROCEDURE — 02100Z9 BYPASS CORONARY ARTERY, ONE ARTERY FROM LEFT INTERNAL MAMMARY, OPEN APPROACH: ICD-10-PCS | Performed by: THORACIC SURGERY (CARDIOTHORACIC VASCULAR SURGERY)

## 2021-06-09 PROCEDURE — 021209W BYPASS CORONARY ARTERY, THREE ARTERIES FROM AORTA WITH AUTOLOGOUS VENOUS TISSUE, OPEN APPROACH: ICD-10-PCS | Performed by: THORACIC SURGERY (CARDIOTHORACIC VASCULAR SURGERY)

## 2021-06-09 PROCEDURE — 36620 INSERTION CATHETER ARTERY: CPT

## 2021-06-09 PROCEDURE — C1894 INTRO/SHEATH, NON-LASER: HCPCS | Performed by: THORACIC SURGERY (CARDIOTHORACIC VASCULAR SURGERY)

## 2021-06-09 PROCEDURE — 6360000002 HC RX W HCPCS: Performed by: THORACIC SURGERY (CARDIOTHORACIC VASCULAR SURGERY)

## 2021-06-09 PROCEDURE — 02HV33Z INSERTION OF INFUSION DEVICE INTO SUPERIOR VENA CAVA, PERCUTANEOUS APPROACH: ICD-10-PCS | Performed by: INTERNAL MEDICINE

## 2021-06-09 PROCEDURE — 35820 EXPLORE CHEST VESSELS: CPT | Performed by: PHYSICIAN ASSISTANT

## 2021-06-09 PROCEDURE — 2720000010 HC SURG SUPPLY STERILE: Performed by: THORACIC SURGERY (CARDIOTHORACIC VASCULAR SURGERY)

## 2021-06-09 PROCEDURE — 6370000000 HC RX 637 (ALT 250 FOR IP): Performed by: THORACIC SURGERY (CARDIOTHORACIC VASCULAR SURGERY)

## 2021-06-09 PROCEDURE — 3600000008 HC SURGERY OHS BASE: Performed by: THORACIC SURGERY (CARDIOTHORACIC VASCULAR SURGERY)

## 2021-06-09 PROCEDURE — 2580000003 HC RX 258: Performed by: NURSE ANESTHETIST, CERTIFIED REGISTERED

## 2021-06-09 PROCEDURE — 82803 BLOOD GASES ANY COMBINATION: CPT

## 2021-06-09 PROCEDURE — 33508 ENDOSCOPIC VEIN HARVEST: CPT | Performed by: THORACIC SURGERY (CARDIOTHORACIC VASCULAR SURGERY)

## 2021-06-09 PROCEDURE — 2580000003 HC RX 258: Performed by: PHYSICIAN ASSISTANT

## 2021-06-09 PROCEDURE — 6370000000 HC RX 637 (ALT 250 FOR IP): Performed by: NURSE ANESTHETIST, CERTIFIED REGISTERED

## 2021-06-09 PROCEDURE — C1729 CATH, DRAINAGE: HCPCS | Performed by: THORACIC SURGERY (CARDIOTHORACIC VASCULAR SURGERY)

## 2021-06-09 PROCEDURE — 3600000018 HC SURGERY OHS ADDTL 15MIN: Performed by: THORACIC SURGERY (CARDIOTHORACIC VASCULAR SURGERY)

## 2021-06-09 PROCEDURE — 36415 COLL VENOUS BLD VENIPUNCTURE: CPT

## 2021-06-09 PROCEDURE — 32551 INSERTION OF CHEST TUBE: CPT

## 2021-06-09 PROCEDURE — 6360000002 HC RX W HCPCS: Performed by: PHYSICIAN ASSISTANT

## 2021-06-09 PROCEDURE — 94002 VENT MGMT INPAT INIT DAY: CPT

## 2021-06-09 PROCEDURE — 85730 THROMBOPLASTIN TIME PARTIAL: CPT

## 2021-06-09 PROCEDURE — 6360000002 HC RX W HCPCS

## 2021-06-09 PROCEDURE — C9113 INJ PANTOPRAZOLE SODIUM, VIA: HCPCS | Performed by: THORACIC SURGERY (CARDIOTHORACIC VASCULAR SURGERY)

## 2021-06-09 PROCEDURE — 7100000001 HC PACU RECOVERY - ADDTL 15 MIN

## 2021-06-09 PROCEDURE — 36556 INSERT NON-TUNNEL CV CATH: CPT

## 2021-06-09 PROCEDURE — 82805 BLOOD GASES W/O2 SATURATION: CPT

## 2021-06-09 PROCEDURE — 3E033PZ INTRODUCTION OF PLATELET INHIBITOR INTO PERIPHERAL VEIN, PERCUTANEOUS APPROACH: ICD-10-PCS | Performed by: THORACIC SURGERY (CARDIOTHORACIC VASCULAR SURGERY)

## 2021-06-09 PROCEDURE — 2709999900 HC NON-CHARGEABLE SUPPLY: Performed by: THORACIC SURGERY (CARDIOTHORACIC VASCULAR SURGERY)

## 2021-06-09 PROCEDURE — C1713 ANCHOR/SCREW BN/BN,TIS/BN: HCPCS | Performed by: THORACIC SURGERY (CARDIOTHORACIC VASCULAR SURGERY)

## 2021-06-09 PROCEDURE — 85610 PROTHROMBIN TIME: CPT

## 2021-06-09 PROCEDURE — P9059 PLASMA, FRZ BETWEEN 8-24HOUR: HCPCS

## 2021-06-09 PROCEDURE — 2580000003 HC RX 258: Performed by: THORACIC SURGERY (CARDIOTHORACIC VASCULAR SURGERY)

## 2021-06-09 PROCEDURE — P9045 ALBUMIN (HUMAN), 5%, 250 ML: HCPCS

## 2021-06-09 PROCEDURE — C1762 CONN TISS, HUMAN(INC FASCIA): HCPCS | Performed by: THORACIC SURGERY (CARDIOTHORACIC VASCULAR SURGERY)

## 2021-06-09 PROCEDURE — P9016 RBC LEUKOCYTES REDUCED: HCPCS

## 2021-06-09 PROCEDURE — 71045 X-RAY EXAM CHEST 1 VIEW: CPT

## 2021-06-09 PROCEDURE — 83605 ASSAY OF LACTIC ACID: CPT

## 2021-06-09 PROCEDURE — 94640 AIRWAY INHALATION TREATMENT: CPT

## 2021-06-09 PROCEDURE — 5A1221Z PERFORMANCE OF CARDIAC OUTPUT, CONTINUOUS: ICD-10-PCS | Performed by: THORACIC SURGERY (CARDIOTHORACIC VASCULAR SURGERY)

## 2021-06-09 PROCEDURE — 2700000000 HC OXYGEN THERAPY PER DAY

## 2021-06-09 PROCEDURE — 99291 CRITICAL CARE FIRST HOUR: CPT | Performed by: NURSE PRACTITIONER

## 2021-06-09 PROCEDURE — 2500000003 HC RX 250 WO HCPCS

## 2021-06-09 PROCEDURE — 33508 ENDOSCOPIC VEIN HARVEST: CPT | Performed by: PHYSICIAN ASSISTANT

## 2021-06-09 PROCEDURE — 3700000001 HC ADD 15 MINUTES (ANESTHESIA): Performed by: THORACIC SURGERY (CARDIOTHORACIC VASCULAR SURGERY)

## 2021-06-09 PROCEDURE — 0W9C0ZZ DRAINAGE OF MEDIASTINUM, OPEN APPROACH: ICD-10-PCS | Performed by: THORACIC SURGERY (CARDIOTHORACIC VASCULAR SURGERY)

## 2021-06-09 PROCEDURE — 33533 CABG ARTERIAL SINGLE: CPT | Performed by: PHYSICIAN ASSISTANT

## 2021-06-09 PROCEDURE — 6370000000 HC RX 637 (ALT 250 FOR IP): Performed by: PHYSICIAN ASSISTANT

## 2021-06-09 PROCEDURE — 83735 ASSAY OF MAGNESIUM: CPT

## 2021-06-09 PROCEDURE — 2000000000 HC ICU R&B

## 2021-06-09 PROCEDURE — 33519 CABG ARTERY-VEIN THREE: CPT | Performed by: THORACIC SURGERY (CARDIOTHORACIC VASCULAR SURGERY)

## 2021-06-09 PROCEDURE — 80048 BASIC METABOLIC PNL TOTAL CA: CPT

## 2021-06-09 PROCEDURE — 37799 UNLISTED PX VASCULAR SURGERY: CPT

## 2021-06-09 PROCEDURE — 85027 COMPLETE CBC AUTOMATED: CPT

## 2021-06-09 PROCEDURE — P9041 ALBUMIN (HUMAN),5%, 50ML: HCPCS | Performed by: NURSE ANESTHETIST, CERTIFIED REGISTERED

## 2021-06-09 PROCEDURE — 0W380ZZ CONTROL BLEEDING IN CHEST WALL, OPEN APPROACH: ICD-10-PCS | Performed by: THORACIC SURGERY (CARDIOTHORACIC VASCULAR SURGERY)

## 2021-06-09 PROCEDURE — P9045 ALBUMIN (HUMAN), 5%, 250 ML: HCPCS | Performed by: THORACIC SURGERY (CARDIOTHORACIC VASCULAR SURGERY)

## 2021-06-09 PROCEDURE — 6360000002 HC RX W HCPCS: Performed by: NURSE ANESTHETIST, CERTIFIED REGISTERED

## 2021-06-09 PROCEDURE — 85347 COAGULATION TIME ACTIVATED: CPT

## 2021-06-09 PROCEDURE — 2500000003 HC RX 250 WO HCPCS: Performed by: NURSE ANESTHETIST, CERTIFIED REGISTERED

## 2021-06-09 PROCEDURE — 2580000003 HC RX 258

## 2021-06-09 PROCEDURE — P9035 PLATELET PHERES LEUKOREDUCED: HCPCS

## 2021-06-09 PROCEDURE — 2500000003 HC RX 250 WO HCPCS: Performed by: THORACIC SURGERY (CARDIOTHORACIC VASCULAR SURGERY)

## 2021-06-09 PROCEDURE — 7100000000 HC PACU RECOVERY - FIRST 15 MIN

## 2021-06-09 PROCEDURE — 82962 GLUCOSE BLOOD TEST: CPT

## 2021-06-09 PROCEDURE — 84132 ASSAY OF SERUM POTASSIUM: CPT

## 2021-06-09 PROCEDURE — 33519 CABG ARTERY-VEIN THREE: CPT | Performed by: PHYSICIAN ASSISTANT

## 2021-06-09 PROCEDURE — 33533 CABG ARTERIAL SINGLE: CPT | Performed by: THORACIC SURGERY (CARDIOTHORACIC VASCULAR SURGERY)

## 2021-06-09 DEVICE — PLATE LCK STRNL 8-H LAD T 1.8MM: Type: IMPLANTABLE DEVICE | Site: STERNUM | Status: FUNCTIONAL

## 2021-06-09 DEVICE — SCREW BNE L9MM DIA2.3MM THOR STRNL TI LOK DRL FREE LEV 1: Type: IMPLANTABLE DEVICE | Site: STERNUM | Status: FUNCTIONAL

## 2021-06-09 DEVICE — IMPLANT HUM TISS L20-80CM DIA3-6MM SAPH VEIN CRYOPRESERVED: Type: IMPLANTABLE DEVICE | Site: HEART | Status: FUNCTIONAL

## 2021-06-09 DEVICE — PLATE BONE 1.8MM THICKNESS STRNL LCK 6 H CP TI: Type: IMPLANTABLE DEVICE | Site: STERNUM | Status: FUNCTIONAL

## 2021-06-09 RX ORDER — FENTANYL CITRATE 0.05 MG/ML
INJECTION, SOLUTION INTRAMUSCULAR; INTRAVENOUS PRN
Status: DISCONTINUED | OUTPATIENT
Start: 2021-06-09 | End: 2021-06-09 | Stop reason: SDUPTHER

## 2021-06-09 RX ORDER — EPHEDRINE SULFATE/0.9% NACL/PF 50 MG/5 ML
SYRINGE (ML) INTRAVENOUS PRN
Status: DISCONTINUED | OUTPATIENT
Start: 2021-06-09 | End: 2021-06-09 | Stop reason: SDUPTHER

## 2021-06-09 RX ORDER — ALBUMIN, HUMAN INJ 5% 5 %
SOLUTION INTRAVENOUS
Status: COMPLETED
Start: 2021-06-09 | End: 2021-06-09

## 2021-06-09 RX ORDER — SODIUM CHLORIDE 9 MG/ML
INJECTION, SOLUTION INTRAVENOUS PRN
Status: DISCONTINUED | OUTPATIENT
Start: 2021-06-09 | End: 2021-06-10

## 2021-06-09 RX ORDER — SODIUM CHLORIDE 9 MG/ML
10 INJECTION INTRAVENOUS DAILY
Status: COMPLETED | OUTPATIENT
Start: 2021-06-09 | End: 2021-06-09

## 2021-06-09 RX ORDER — ALBUMIN, HUMAN INJ 5% 5 %
SOLUTION INTRAVENOUS PRN
Status: DISCONTINUED | OUTPATIENT
Start: 2021-06-09 | End: 2021-06-09 | Stop reason: SDUPTHER

## 2021-06-09 RX ORDER — ETOMIDATE 2 MG/ML
INJECTION INTRAVENOUS PRN
Status: DISCONTINUED | OUTPATIENT
Start: 2021-06-09 | End: 2021-06-09 | Stop reason: SDUPTHER

## 2021-06-09 RX ORDER — SODIUM CHLORIDE 9 MG/ML
30 INJECTION, SOLUTION INTRAVENOUS CONTINUOUS
Status: DISCONTINUED | OUTPATIENT
Start: 2021-06-09 | End: 2021-06-13

## 2021-06-09 RX ORDER — CEFAZOLIN SODIUM 1 G/3ML
INJECTION, POWDER, FOR SOLUTION INTRAMUSCULAR; INTRAVENOUS PRN
Status: DISCONTINUED | OUTPATIENT
Start: 2021-06-09 | End: 2021-06-09 | Stop reason: SDUPTHER

## 2021-06-09 RX ORDER — PHENYLEPHRINE HYDROCHLORIDE 10 MG/ML
INJECTION INTRAVENOUS PRN
Status: DISCONTINUED | OUTPATIENT
Start: 2021-06-09 | End: 2021-06-09 | Stop reason: SDUPTHER

## 2021-06-09 RX ORDER — FENTANYL CITRATE 50 UG/ML
25 INJECTION, SOLUTION INTRAMUSCULAR; INTRAVENOUS
Status: DISCONTINUED | OUTPATIENT
Start: 2021-06-09 | End: 2021-06-13

## 2021-06-09 RX ORDER — SODIUM CHLORIDE 0.9 % (FLUSH) 0.9 %
10 SYRINGE (ML) INJECTION EVERY 12 HOURS SCHEDULED
Status: DISCONTINUED | OUTPATIENT
Start: 2021-06-09 | End: 2021-06-17 | Stop reason: HOSPADM

## 2021-06-09 RX ORDER — LIDOCAINE HYDROCHLORIDE 20 MG/ML
INJECTION, SOLUTION INTRAVENOUS PRN
Status: DISCONTINUED | OUTPATIENT
Start: 2021-06-09 | End: 2021-06-09 | Stop reason: SDUPTHER

## 2021-06-09 RX ORDER — DEXTROSE MONOHYDRATE 25 G/50ML
12.5 INJECTION, SOLUTION INTRAVENOUS PRN
Status: DISCONTINUED | OUTPATIENT
Start: 2021-06-09 | End: 2021-06-13

## 2021-06-09 RX ORDER — AMINOCAPROIC ACID 250 MG/ML
INJECTION, SOLUTION INTRAVENOUS PRN
Status: DISCONTINUED | OUTPATIENT
Start: 2021-06-09 | End: 2021-06-09 | Stop reason: SDUPTHER

## 2021-06-09 RX ORDER — OXYCODONE HYDROCHLORIDE 5 MG/1
5 TABLET ORAL EVERY 4 HOURS PRN
Status: DISCONTINUED | OUTPATIENT
Start: 2021-06-09 | End: 2021-06-13

## 2021-06-09 RX ORDER — ALBUMIN (HUMAN) 12.5 G/50ML
25 SOLUTION INTRAVENOUS ONCE
Status: DISCONTINUED | OUTPATIENT
Start: 2021-06-09 | End: 2021-06-11

## 2021-06-09 RX ORDER — PHENYLEPHRINE HCL IN 0.9% NACL 1 MG/10 ML
SYRINGE (ML) INTRAVENOUS PRN
Status: DISCONTINUED | OUTPATIENT
Start: 2021-06-09 | End: 2021-06-09 | Stop reason: SDUPTHER

## 2021-06-09 RX ORDER — ASPIRIN 81 MG/1
81 TABLET ORAL DAILY
Status: DISCONTINUED | OUTPATIENT
Start: 2021-06-10 | End: 2021-06-13

## 2021-06-09 RX ORDER — PROTAMINE SULFATE 10 MG/ML
INJECTION, SOLUTION INTRAVENOUS PRN
Status: DISCONTINUED | OUTPATIENT
Start: 2021-06-09 | End: 2021-06-09 | Stop reason: SDUPTHER

## 2021-06-09 RX ORDER — OXYCODONE HYDROCHLORIDE 10 MG/1
10 TABLET ORAL EVERY 4 HOURS PRN
Status: DISCONTINUED | OUTPATIENT
Start: 2021-06-09 | End: 2021-06-13

## 2021-06-09 RX ORDER — MIDAZOLAM HYDROCHLORIDE 1 MG/ML
INJECTION INTRAMUSCULAR; INTRAVENOUS PRN
Status: DISCONTINUED | OUTPATIENT
Start: 2021-06-09 | End: 2021-06-09 | Stop reason: SDUPTHER

## 2021-06-09 RX ORDER — SENNA AND DOCUSATE SODIUM 50; 8.6 MG/1; MG/1
1 TABLET, FILM COATED ORAL 2 TIMES DAILY
Status: DISCONTINUED | OUTPATIENT
Start: 2021-06-09 | End: 2021-06-13

## 2021-06-09 RX ORDER — SODIUM CHLORIDE, SODIUM LACTATE, POTASSIUM CHLORIDE, CALCIUM CHLORIDE 600; 310; 30; 20 MG/100ML; MG/100ML; MG/100ML; MG/100ML
INJECTION, SOLUTION INTRAVENOUS CONTINUOUS PRN
Status: DISCONTINUED | OUTPATIENT
Start: 2021-06-09 | End: 2021-06-09 | Stop reason: SDUPTHER

## 2021-06-09 RX ORDER — PROPOFOL 10 MG/ML
10 INJECTION, EMULSION INTRAVENOUS CONTINUOUS PRN
Status: DISCONTINUED | OUTPATIENT
Start: 2021-06-09 | End: 2021-06-10

## 2021-06-09 RX ORDER — NEOSTIGMINE METHYLSULFATE 1 MG/ML
INJECTION, SOLUTION INTRAVENOUS PRN
Status: DISCONTINUED | OUTPATIENT
Start: 2021-06-09 | End: 2021-06-09 | Stop reason: SDUPTHER

## 2021-06-09 RX ORDER — SODIUM CHLORIDE 9 MG/ML
INJECTION, SOLUTION INTRAVENOUS CONTINUOUS PRN
Status: DISCONTINUED | OUTPATIENT
Start: 2021-06-09 | End: 2021-06-09 | Stop reason: SDUPTHER

## 2021-06-09 RX ORDER — CLOPIDOGREL BISULFATE 75 MG/1
75 TABLET ORAL DAILY
Status: DISCONTINUED | OUTPATIENT
Start: 2021-06-10 | End: 2021-06-17 | Stop reason: HOSPADM

## 2021-06-09 RX ORDER — ACETAMINOPHEN 650 MG/1
650 SUPPOSITORY RECTAL EVERY 4 HOURS PRN
Status: DISCONTINUED | OUTPATIENT
Start: 2021-06-09 | End: 2021-06-13

## 2021-06-09 RX ORDER — PANTOPRAZOLE SODIUM 40 MG/1
40 TABLET, DELAYED RELEASE ORAL DAILY
Status: DISCONTINUED | OUTPATIENT
Start: 2021-06-10 | End: 2021-06-17 | Stop reason: HOSPADM

## 2021-06-09 RX ORDER — ALBUMIN, HUMAN INJ 5% 5 %
25 SOLUTION INTRAVENOUS PRN
Status: DISCONTINUED | OUTPATIENT
Start: 2021-06-09 | End: 2021-06-13

## 2021-06-09 RX ORDER — SODIUM CHLORIDE 9 MG/ML
25 INJECTION, SOLUTION INTRAVENOUS PRN
Status: DISCONTINUED | OUTPATIENT
Start: 2021-06-09 | End: 2021-06-10

## 2021-06-09 RX ORDER — ONDANSETRON 2 MG/ML
4 INJECTION INTRAMUSCULAR; INTRAVENOUS EVERY 8 HOURS PRN
Status: DISCONTINUED | OUTPATIENT
Start: 2021-06-09 | End: 2021-06-13

## 2021-06-09 RX ORDER — EPINEPHRINE 1 MG/ML
INJECTION PARENTERAL PRN
Status: DISCONTINUED | OUTPATIENT
Start: 2021-06-09 | End: 2021-06-09 | Stop reason: SDUPTHER

## 2021-06-09 RX ORDER — INSULIN GLARGINE 100 [IU]/ML
0.15 INJECTION, SOLUTION SUBCUTANEOUS NIGHTLY
Status: DISCONTINUED | OUTPATIENT
Start: 2021-06-10 | End: 2021-06-09

## 2021-06-09 RX ORDER — PROPOFOL 10 MG/ML
INJECTION, EMULSION INTRAVENOUS
Status: COMPLETED
Start: 2021-06-09 | End: 2021-06-09

## 2021-06-09 RX ORDER — 0.9 % SODIUM CHLORIDE 0.9 %
250 INTRAVENOUS SOLUTION INTRAVENOUS CONTINUOUS PRN
Status: DISCONTINUED | OUTPATIENT
Start: 2021-06-09 | End: 2021-06-10

## 2021-06-09 RX ORDER — MAGNESIUM SULFATE IN WATER 40 MG/ML
2000 INJECTION, SOLUTION INTRAVENOUS PRN
Status: DISCONTINUED | OUTPATIENT
Start: 2021-06-09 | End: 2021-06-13

## 2021-06-09 RX ORDER — NICOTINE POLACRILEX 4 MG
15 LOZENGE BUCCAL PRN
Status: DISCONTINUED | OUTPATIENT
Start: 2021-06-09 | End: 2021-06-13

## 2021-06-09 RX ORDER — ASPIRIN 300 MG/1
300 SUPPOSITORY RECTAL ONCE
Status: COMPLETED | OUTPATIENT
Start: 2021-06-09 | End: 2021-06-09

## 2021-06-09 RX ORDER — PANTOPRAZOLE SODIUM 40 MG/10ML
40 INJECTION, POWDER, LYOPHILIZED, FOR SOLUTION INTRAVENOUS DAILY
Status: COMPLETED | OUTPATIENT
Start: 2021-06-09 | End: 2021-06-09

## 2021-06-09 RX ORDER — GLYCOPYRROLATE 1 MG/5 ML
SYRINGE (ML) INTRAVENOUS PRN
Status: DISCONTINUED | OUTPATIENT
Start: 2021-06-09 | End: 2021-06-09 | Stop reason: SDUPTHER

## 2021-06-09 RX ORDER — CHLORHEXIDINE GLUCONATE 0.12 MG/ML
15 RINSE ORAL 2 TIMES DAILY
Status: DISCONTINUED | OUTPATIENT
Start: 2021-06-09 | End: 2021-06-11

## 2021-06-09 RX ORDER — VECURONIUM BROMIDE 1 MG/ML
INJECTION, POWDER, LYOPHILIZED, FOR SOLUTION INTRAVENOUS PRN
Status: DISCONTINUED | OUTPATIENT
Start: 2021-06-09 | End: 2021-06-09 | Stop reason: SDUPTHER

## 2021-06-09 RX ORDER — FAMOTIDINE 20 MG/1
20 TABLET, FILM COATED ORAL DAILY
Status: DISCONTINUED | OUTPATIENT
Start: 2021-06-09 | End: 2021-06-17 | Stop reason: HOSPADM

## 2021-06-09 RX ORDER — FENTANYL CITRATE 50 UG/ML
50 INJECTION, SOLUTION INTRAMUSCULAR; INTRAVENOUS
Status: DISCONTINUED | OUTPATIENT
Start: 2021-06-09 | End: 2021-06-11

## 2021-06-09 RX ORDER — HEPARIN SODIUM 10000 [USP'U]/ML
INJECTION, SOLUTION INTRAVENOUS; SUBCUTANEOUS PRN
Status: DISCONTINUED | OUTPATIENT
Start: 2021-06-09 | End: 2021-06-09 | Stop reason: SDUPTHER

## 2021-06-09 RX ORDER — MEPERIDINE HYDROCHLORIDE 50 MG/ML
25 INJECTION INTRAMUSCULAR; INTRAVENOUS; SUBCUTANEOUS
Status: COMPLETED | OUTPATIENT
Start: 2021-06-09 | End: 2021-06-09

## 2021-06-09 RX ORDER — POTASSIUM CHLORIDE 29.8 MG/ML
20 INJECTION INTRAVENOUS PRN
Status: DISCONTINUED | OUTPATIENT
Start: 2021-06-09 | End: 2021-06-13

## 2021-06-09 RX ORDER — SODIUM CHLORIDE 0.9 % (FLUSH) 0.9 %
10 SYRINGE (ML) INJECTION PRN
Status: DISCONTINUED | OUTPATIENT
Start: 2021-06-09 | End: 2021-06-17 | Stop reason: HOSPADM

## 2021-06-09 RX ORDER — PROPOFOL 10 MG/ML
INJECTION, EMULSION INTRAVENOUS PRN
Status: DISCONTINUED | OUTPATIENT
Start: 2021-06-09 | End: 2021-06-09 | Stop reason: SDUPTHER

## 2021-06-09 RX ORDER — DEXTROSE MONOHYDRATE 50 MG/ML
100 INJECTION, SOLUTION INTRAVENOUS PRN
Status: DISCONTINUED | OUTPATIENT
Start: 2021-06-09 | End: 2021-06-13

## 2021-06-09 RX ORDER — PROPOFOL 10 MG/ML
INJECTION, EMULSION INTRAVENOUS
Status: DISPENSED
Start: 2021-06-09 | End: 2021-06-10

## 2021-06-09 RX ORDER — ACETAMINOPHEN 325 MG/1
650 TABLET ORAL EVERY 4 HOURS PRN
Status: DISCONTINUED | OUTPATIENT
Start: 2021-06-09 | End: 2021-06-13

## 2021-06-09 RX ORDER — CALCIUM CHLORIDE 100 MG/ML
INJECTION INTRAVENOUS; INTRAVENTRICULAR PRN
Status: DISCONTINUED | OUTPATIENT
Start: 2021-06-09 | End: 2021-06-09 | Stop reason: SDUPTHER

## 2021-06-09 RX ORDER — IPRATROPIUM BROMIDE AND ALBUTEROL SULFATE 2.5; .5 MG/3ML; MG/3ML
1 SOLUTION RESPIRATORY (INHALATION)
Status: DISCONTINUED | OUTPATIENT
Start: 2021-06-09 | End: 2021-06-17 | Stop reason: HOSPADM

## 2021-06-09 RX ADMIN — Medication 100 MCG: at 10:36

## 2021-06-09 RX ADMIN — SODIUM BICARBONATE 100 MEQ: 84 INJECTION, SOLUTION INTRAVENOUS at 14:32

## 2021-06-09 RX ADMIN — 0.12% CHLORHEXIDINE GLUCONATE 15 ML: 1.2 RINSE ORAL at 13:00

## 2021-06-09 RX ADMIN — PHENYLEPHRINE HYDROCHLORIDE 150 MCG: 10 INJECTION INTRAVENOUS at 21:23

## 2021-06-09 RX ADMIN — CEFAZOLIN 2000 MG: 1 INJECTION, POWDER, FOR SOLUTION INTRAMUSCULAR; INTRAVENOUS at 21:34

## 2021-06-09 RX ADMIN — IPRATROPIUM BROMIDE AND ALBUTEROL SULFATE 1 AMPULE: 2.5; .5 SOLUTION RESPIRATORY (INHALATION) at 13:34

## 2021-06-09 RX ADMIN — Medication 2000 MG: at 07:32

## 2021-06-09 RX ADMIN — POTASSIUM CHLORIDE 20 MEQ: 400 INJECTION, SOLUTION INTRAVENOUS at 13:42

## 2021-06-09 RX ADMIN — Medication 100 MCG: at 08:47

## 2021-06-09 RX ADMIN — ALBUMIN (HUMAN) 25 G: 12.5 INJECTION, SOLUTION INTRAVENOUS at 13:54

## 2021-06-09 RX ADMIN — PROPOFOL 10 MCG/KG/MIN: 10 INJECTION, EMULSION INTRAVENOUS at 12:35

## 2021-06-09 RX ADMIN — CEFAZOLIN 2000 MG: 10 INJECTION, POWDER, FOR SOLUTION INTRAVENOUS at 17:59

## 2021-06-09 RX ADMIN — SODIUM CHLORIDE 5 UNITS/HR: 9 INJECTION, SOLUTION INTRAVENOUS at 09:13

## 2021-06-09 RX ADMIN — MIDAZOLAM 2 MG: 1 INJECTION INTRAMUSCULAR; INTRAVENOUS at 06:36

## 2021-06-09 RX ADMIN — ETOMIDATE 16 MG: 2 INJECTION, SOLUTION INTRAVENOUS at 21:14

## 2021-06-09 RX ADMIN — Medication 100 MCG: at 11:38

## 2021-06-09 RX ADMIN — HEPARIN SODIUM 20000 UNITS: 10000 INJECTION INTRAVENOUS; SUBCUTANEOUS at 08:13

## 2021-06-09 RX ADMIN — FENTANYL CITRATE 150 MCG: 50 INJECTION, SOLUTION INTRAMUSCULAR; INTRAVENOUS at 07:48

## 2021-06-09 RX ADMIN — FENTANYL CITRATE 100 MCG: 50 INJECTION, SOLUTION INTRAMUSCULAR; INTRAVENOUS at 11:22

## 2021-06-09 RX ADMIN — PROPOFOL 30 MG: 10 INJECTION, EMULSION INTRAVENOUS at 12:12

## 2021-06-09 RX ADMIN — CALCIUM CHLORIDE 1 G: 100 INJECTION, SOLUTION INTRAVENOUS at 10:29

## 2021-06-09 RX ADMIN — SODIUM CHLORIDE, POTASSIUM CHLORIDE, SODIUM LACTATE AND CALCIUM CHLORIDE: 600; 310; 30; 20 INJECTION, SOLUTION INTRAVENOUS at 07:09

## 2021-06-09 RX ADMIN — FENTANYL CITRATE 250 MCG: 50 INJECTION, SOLUTION INTRAMUSCULAR; INTRAVENOUS at 21:14

## 2021-06-09 RX ADMIN — AMINOCAPROIC ACID 5000 MG: 250 INJECTION, SOLUTION INTRAVENOUS at 07:42

## 2021-06-09 RX ADMIN — Medication 100 MCG: at 10:58

## 2021-06-09 RX ADMIN — INSULIN HUMAN 5 UNITS: 100 INJECTION, SOLUTION PARENTERAL at 09:13

## 2021-06-09 RX ADMIN — FENTANYL CITRATE 25 MCG: 50 INJECTION, SOLUTION INTRAMUSCULAR; INTRAVENOUS at 17:22

## 2021-06-09 RX ADMIN — FENTANYL CITRATE 250 MCG: 50 INJECTION, SOLUTION INTRAMUSCULAR; INTRAVENOUS at 21:31

## 2021-06-09 RX ADMIN — Medication 100 MCG: at 08:38

## 2021-06-09 RX ADMIN — INSULIN LISPRO 3 UNITS: 100 INJECTION, SOLUTION INTRAVENOUS; SUBCUTANEOUS at 23:06

## 2021-06-09 RX ADMIN — Medication 0.04 MCG/KG/MIN: at 10:40

## 2021-06-09 RX ADMIN — NOREPINEPHRINE BITARTRATE 4 MCG/MIN: 1 INJECTION, SOLUTION, CONCENTRATE INTRAVENOUS at 12:30

## 2021-06-09 RX ADMIN — Medication 2000 MG: at 10:32

## 2021-06-09 RX ADMIN — Medication 100 MCG: at 11:01

## 2021-06-09 RX ADMIN — FENTANYL CITRATE 25 MCG: 50 INJECTION, SOLUTION INTRAMUSCULAR; INTRAVENOUS at 13:18

## 2021-06-09 RX ADMIN — Medication 3 MG: at 12:12

## 2021-06-09 RX ADMIN — INSULIN HUMAN 5 UNITS: 100 INJECTION, SOLUTION PARENTERAL at 10:01

## 2021-06-09 RX ADMIN — SODIUM CHLORIDE, POTASSIUM CHLORIDE, SODIUM LACTATE AND CALCIUM CHLORIDE: 600; 310; 30; 20 INJECTION, SOLUTION INTRAVENOUS at 07:08

## 2021-06-09 RX ADMIN — MEPERIDINE HYDROCHLORIDE 25 MG: 50 INJECTION, SOLUTION INTRAMUSCULAR; INTRAVENOUS; SUBCUTANEOUS at 14:45

## 2021-06-09 RX ADMIN — FENTANYL CITRATE 50 MCG: 50 INJECTION, SOLUTION INTRAMUSCULAR; INTRAVENOUS at 08:16

## 2021-06-09 RX ADMIN — ALBUMIN (HUMAN) 25 G: 12.5 INJECTION, SOLUTION INTRAVENOUS at 12:35

## 2021-06-09 RX ADMIN — Medication 20 MG: at 07:42

## 2021-06-09 RX ADMIN — Medication 10 ML: at 20:31

## 2021-06-09 RX ADMIN — VECURONIUM BROMIDE 10 MG: 10 INJECTION, POWDER, LYOPHILIZED, FOR SOLUTION INTRAVENOUS at 21:14

## 2021-06-09 RX ADMIN — Medication 100 MCG: at 10:40

## 2021-06-09 RX ADMIN — MIDAZOLAM 1 MG: 1 INJECTION INTRAMUSCULAR; INTRAVENOUS at 06:45

## 2021-06-09 RX ADMIN — AMINOCAPROIC ACID 1 G/HR: 250 INJECTION, SOLUTION INTRAVENOUS at 07:42

## 2021-06-09 RX ADMIN — IPRATROPIUM BROMIDE AND ALBUTEROL SULFATE 1 AMPULE: 2.5; .5 SOLUTION RESPIRATORY (INHALATION) at 17:31

## 2021-06-09 RX ADMIN — Medication 100 MEQ: at 14:32

## 2021-06-09 RX ADMIN — 0.12% CHLORHEXIDINE GLUCONATE 15 ML: 1.2 RINSE ORAL at 04:58

## 2021-06-09 RX ADMIN — MUPIROCIN: 20 OINTMENT TOPICAL at 14:34

## 2021-06-09 RX ADMIN — PROPOFOL 10 MCG/KG/MIN: 10 INJECTION, EMULSION INTRAVENOUS at 22:58

## 2021-06-09 RX ADMIN — SODIUM CHLORIDE, PRESERVATIVE FREE 10 ML: 5 INJECTION INTRAVENOUS at 13:00

## 2021-06-09 RX ADMIN — LIDOCAINE HYDROCHLORIDE 50 MG: 20 INJECTION, SOLUTION INTRAVENOUS at 07:18

## 2021-06-09 RX ADMIN — SODIUM CHLORIDE, PRESERVATIVE FREE 10 ML: 5 INJECTION INTRAVENOUS at 18:01

## 2021-06-09 RX ADMIN — SODIUM CHLORIDE 30 ML/HR: 9 INJECTION, SOLUTION INTRAVENOUS at 12:36

## 2021-06-09 RX ADMIN — PIPERACILLIN AND TAZOBACTAM 3375 MG: 3; .375 INJECTION, POWDER, LYOPHILIZED, FOR SOLUTION INTRAVENOUS at 23:37

## 2021-06-09 RX ADMIN — FENTANYL CITRATE 150 MCG: 50 INJECTION, SOLUTION INTRAMUSCULAR; INTRAVENOUS at 07:44

## 2021-06-09 RX ADMIN — SODIUM CHLORIDE: 9 INJECTION, SOLUTION INTRAVENOUS at 06:36

## 2021-06-09 RX ADMIN — ALBUMIN (HUMAN) 500 ML: 12.5 INJECTION, SOLUTION INTRAVENOUS at 11:34

## 2021-06-09 RX ADMIN — PROTAMINE SULFATE 200 MG: 10 INJECTION, SOLUTION INTRAVENOUS at 10:29

## 2021-06-09 RX ADMIN — VECURONIUM BROMIDE 6 MG: 10 INJECTION, POWDER, LYOPHILIZED, FOR SOLUTION INTRAVENOUS at 07:18

## 2021-06-09 RX ADMIN — ASPIRIN 300 MG: 300 SUPPOSITORY RECTAL at 13:00

## 2021-06-09 RX ADMIN — SODIUM CHLORIDE, POTASSIUM CHLORIDE, SODIUM LACTATE AND CALCIUM CHLORIDE: 600; 310; 30; 20 INJECTION, SOLUTION INTRAVENOUS at 06:30

## 2021-06-09 RX ADMIN — VECURONIUM BROMIDE 4 MG: 10 INJECTION, POWDER, LYOPHILIZED, FOR SOLUTION INTRAVENOUS at 08:20

## 2021-06-09 RX ADMIN — Medication 100 MCG: at 10:43

## 2021-06-09 RX ADMIN — Medication 100 MCG: at 11:32

## 2021-06-09 RX ADMIN — SODIUM CHLORIDE, POTASSIUM CHLORIDE, SODIUM LACTATE AND CALCIUM CHLORIDE: 600; 310; 30; 20 INJECTION, SOLUTION INTRAVENOUS at 21:09

## 2021-06-09 RX ADMIN — Medication 0.6 MG: at 12:12

## 2021-06-09 RX ADMIN — FENTANYL CITRATE 250 MCG: 50 INJECTION, SOLUTION INTRAMUSCULAR; INTRAVENOUS at 07:18

## 2021-06-09 RX ADMIN — POTASSIUM CHLORIDE 20 MEQ: 400 INJECTION, SOLUTION INTRAVENOUS at 23:57

## 2021-06-09 RX ADMIN — VECURONIUM BROMIDE 2 MG: 10 INJECTION, POWDER, LYOPHILIZED, FOR SOLUTION INTRAVENOUS at 10:48

## 2021-06-09 RX ADMIN — PANTOPRAZOLE SODIUM 40 MG: 40 INJECTION, POWDER, FOR SOLUTION INTRAVENOUS at 13:00

## 2021-06-09 RX ADMIN — MIDAZOLAM 1 MG: 1 INJECTION INTRAMUSCULAR; INTRAVENOUS at 06:49

## 2021-06-09 RX ADMIN — EPINEPHRINE 80 MCG: 1 INJECTION PARENTERAL at 21:26

## 2021-06-09 RX ADMIN — Medication 100 MCG: at 07:38

## 2021-06-09 RX ADMIN — Medication 100 MCG: at 08:34

## 2021-06-09 RX ADMIN — IPRATROPIUM BROMIDE AND ALBUTEROL SULFATE 1 AMPULE: 2.5; .5 SOLUTION RESPIRATORY (INHALATION) at 20:58

## 2021-06-09 RX ADMIN — FENTANYL CITRATE 500 MCG: 50 INJECTION, SOLUTION INTRAMUSCULAR; INTRAVENOUS at 21:27

## 2021-06-09 RX ADMIN — EPINEPHRINE 20 MCG: 1 INJECTION PARENTERAL at 21:22

## 2021-06-09 RX ADMIN — ETOMIDATE 18 MG: 2 INJECTION, SOLUTION INTRAVENOUS at 07:18

## 2021-06-09 RX ADMIN — ALBUMIN (HUMAN) 25 G: 12.5 INJECTION, SOLUTION INTRAVENOUS at 19:14

## 2021-06-09 ASSESSMENT — PULMONARY FUNCTION TESTS
PIF_VALUE: 1
PIF_VALUE: 16
PIF_VALUE: 17
PIF_VALUE: 1
PIF_VALUE: 17
PIF_VALUE: 17
PIF_VALUE: 16
PIF_VALUE: 18
PIF_VALUE: 18
PIF_VALUE: 19
PIF_VALUE: 1
PIF_VALUE: 21
PIF_VALUE: 21
PIF_VALUE: 18
PIF_VALUE: 1
PIF_VALUE: 19
PIF_VALUE: 1
PIF_VALUE: 24
PIF_VALUE: 21
PIF_VALUE: 15
PIF_VALUE: 17
PIF_VALUE: 20
PIF_VALUE: 19
PIF_VALUE: 1
PIF_VALUE: 17
PIF_VALUE: 18
PIF_VALUE: 15
PIF_VALUE: 32
PIF_VALUE: 22
PIF_VALUE: 1
PIF_VALUE: 17
PIF_VALUE: 2
PIF_VALUE: 15
PIF_VALUE: 18
PIF_VALUE: 1
PIF_VALUE: 21
PIF_VALUE: 18
PIF_VALUE: 18
PIF_VALUE: 1
PIF_VALUE: 17
PIF_VALUE: 17
PIF_VALUE: 1
PIF_VALUE: 1
PIF_VALUE: 17
PIF_VALUE: 1
PIF_VALUE: 19
PIF_VALUE: 16
PIF_VALUE: 17
PIF_VALUE: 19
PIF_VALUE: 1
PIF_VALUE: 13
PIF_VALUE: 14
PIF_VALUE: 22
PIF_VALUE: 16
PIF_VALUE: 22
PIF_VALUE: 19
PIF_VALUE: 1
PIF_VALUE: 1
PIF_VALUE: 19
PIF_VALUE: 20
PIF_VALUE: 22
PIF_VALUE: 21
PIF_VALUE: 2
PIF_VALUE: 1
PIF_VALUE: 18
PIF_VALUE: 1
PIF_VALUE: 15
PIF_VALUE: 20
PIF_VALUE: 16
PIF_VALUE: 20
PIF_VALUE: 16
PIF_VALUE: 1
PIF_VALUE: 18
PIF_VALUE: 1
PIF_VALUE: 17
PIF_VALUE: 22
PIF_VALUE: 0
PIF_VALUE: 16
PIF_VALUE: 18
PIF_VALUE: 1
PIF_VALUE: 22
PIF_VALUE: 17
PIF_VALUE: 20
PIF_VALUE: 17
PIF_VALUE: 1
PIF_VALUE: 20
PIF_VALUE: 17
PIF_VALUE: 19
PIF_VALUE: 0
PIF_VALUE: 16
PIF_VALUE: 1
PIF_VALUE: 16
PIF_VALUE: 18
PIF_VALUE: 1
PIF_VALUE: 15
PIF_VALUE: 21
PIF_VALUE: 1
PIF_VALUE: 19
PIF_VALUE: 1
PIF_VALUE: 22
PIF_VALUE: 1
PIF_VALUE: 22
PIF_VALUE: 1
PIF_VALUE: 17
PIF_VALUE: 31
PIF_VALUE: 17
PIF_VALUE: 17
PIF_VALUE: 1
PIF_VALUE: 19
PIF_VALUE: 1
PIF_VALUE: 1
PIF_VALUE: 19
PIF_VALUE: 17
PIF_VALUE: 24
PIF_VALUE: 1
PIF_VALUE: 1
PIF_VALUE: 18
PIF_VALUE: 0
PIF_VALUE: 16
PIF_VALUE: 1
PIF_VALUE: 17
PIF_VALUE: 1
PIF_VALUE: 2
PIF_VALUE: 18
PIF_VALUE: 1
PIF_VALUE: 18
PIF_VALUE: 35
PIF_VALUE: 16
PIF_VALUE: 16
PIF_VALUE: 14
PIF_VALUE: 1
PIF_VALUE: 0
PIF_VALUE: 18
PIF_VALUE: 0
PIF_VALUE: 1
PIF_VALUE: 18
PIF_VALUE: 18
PIF_VALUE: 1
PIF_VALUE: 27
PIF_VALUE: 1
PIF_VALUE: 2
PIF_VALUE: 19
PIF_VALUE: 16
PIF_VALUE: 1
PIF_VALUE: 22
PIF_VALUE: 1
PIF_VALUE: 0
PIF_VALUE: 22
PIF_VALUE: 1
PIF_VALUE: 15
PIF_VALUE: 20
PIF_VALUE: 15
PIF_VALUE: 1
PIF_VALUE: 17
PIF_VALUE: 17
PIF_VALUE: 18
PIF_VALUE: 41
PIF_VALUE: 18
PIF_VALUE: 1
PIF_VALUE: 16
PIF_VALUE: 21
PIF_VALUE: 22
PIF_VALUE: 18
PIF_VALUE: 17
PIF_VALUE: 17
PIF_VALUE: 18
PIF_VALUE: 22
PIF_VALUE: 18
PIF_VALUE: 18
PIF_VALUE: 17
PIF_VALUE: 16
PIF_VALUE: 1
PIF_VALUE: 1
PIF_VALUE: 16
PIF_VALUE: 1
PIF_VALUE: 15
PIF_VALUE: 16
PIF_VALUE: 1
PIF_VALUE: 18
PIF_VALUE: 1
PIF_VALUE: 0
PIF_VALUE: 3
PIF_VALUE: 17
PIF_VALUE: 16
PIF_VALUE: 2
PIF_VALUE: 20
PIF_VALUE: 15
PIF_VALUE: 18
PIF_VALUE: 20
PIF_VALUE: 19
PIF_VALUE: 16
PIF_VALUE: 13
PIF_VALUE: 16
PIF_VALUE: 19
PIF_VALUE: 17
PIF_VALUE: 16
PIF_VALUE: 20
PIF_VALUE: 21
PIF_VALUE: 17
PIF_VALUE: 0
PIF_VALUE: 20
PIF_VALUE: 0
PIF_VALUE: 1
PIF_VALUE: 18
PIF_VALUE: 22
PIF_VALUE: 18
PIF_VALUE: 15
PIF_VALUE: 18
PIF_VALUE: 3
PIF_VALUE: 1
PIF_VALUE: 20
PIF_VALUE: 17
PIF_VALUE: 18
PIF_VALUE: 1
PIF_VALUE: 15
PIF_VALUE: 19
PIF_VALUE: 17
PIF_VALUE: 23
PIF_VALUE: 23
PIF_VALUE: 1
PIF_VALUE: 16
PIF_VALUE: 22
PIF_VALUE: 25
PIF_VALUE: 19
PIF_VALUE: 0
PIF_VALUE: 1
PIF_VALUE: 17
PIF_VALUE: 0
PIF_VALUE: 24
PIF_VALUE: 1
PIF_VALUE: 41
PIF_VALUE: 17
PIF_VALUE: 0
PIF_VALUE: 16
PIF_VALUE: 31
PIF_VALUE: 20
PIF_VALUE: 1
PIF_VALUE: 17
PIF_VALUE: 16
PIF_VALUE: 2
PIF_VALUE: 18
PIF_VALUE: 16
PIF_VALUE: 1
PIF_VALUE: 15
PIF_VALUE: 17
PIF_VALUE: 17
PIF_VALUE: 1
PIF_VALUE: 0
PIF_VALUE: 15
PIF_VALUE: 17
PIF_VALUE: 2
PIF_VALUE: 19
PIF_VALUE: 16
PIF_VALUE: 17
PIF_VALUE: 19
PIF_VALUE: 1
PIF_VALUE: 1
PIF_VALUE: 16
PIF_VALUE: 18
PIF_VALUE: 3
PIF_VALUE: 22
PIF_VALUE: 1
PIF_VALUE: 1
PIF_VALUE: 17
PIF_VALUE: 1
PIF_VALUE: 16
PIF_VALUE: 15
PIF_VALUE: 19
PIF_VALUE: 18
PIF_VALUE: 18
PIF_VALUE: 17
PIF_VALUE: 21
PIF_VALUE: 19
PIF_VALUE: 0
PIF_VALUE: 18
PIF_VALUE: 17
PIF_VALUE: 19
PIF_VALUE: 16
PIF_VALUE: 19
PIF_VALUE: 20
PIF_VALUE: 18
PIF_VALUE: 17
PIF_VALUE: 21
PIF_VALUE: 20
PIF_VALUE: 15
PIF_VALUE: 18
PIF_VALUE: 18
PIF_VALUE: 1
PIF_VALUE: 1
PIF_VALUE: 15
PIF_VALUE: 18
PIF_VALUE: 1
PIF_VALUE: 15
PIF_VALUE: 32
PIF_VALUE: 16
PIF_VALUE: 18
PIF_VALUE: 2
PIF_VALUE: 1
PIF_VALUE: 20
PIF_VALUE: 0
PIF_VALUE: 19
PIF_VALUE: 1
PIF_VALUE: 1
PIF_VALUE: 17
PIF_VALUE: 22
PIF_VALUE: 42
PIF_VALUE: 22
PIF_VALUE: 22
PIF_VALUE: 1
PIF_VALUE: 19
PIF_VALUE: 17
PIF_VALUE: 20
PIF_VALUE: 18
PIF_VALUE: 27
PIF_VALUE: 1
PIF_VALUE: 1
PIF_VALUE: 18
PIF_VALUE: 1
PIF_VALUE: 18
PIF_VALUE: 1
PIF_VALUE: 21
PIF_VALUE: 1
PIF_VALUE: 14
PIF_VALUE: 25
PIF_VALUE: 18
PIF_VALUE: 1
PIF_VALUE: 26
PIF_VALUE: 16
PIF_VALUE: 20
PIF_VALUE: 15
PIF_VALUE: 2
PIF_VALUE: 17
PIF_VALUE: 17
PIF_VALUE: 24
PIF_VALUE: 17
PIF_VALUE: 19
PIF_VALUE: 1
PIF_VALUE: 1
PIF_VALUE: 21
PIF_VALUE: 16
PIF_VALUE: 1
PIF_VALUE: 17
PIF_VALUE: 1
PIF_VALUE: 18
PIF_VALUE: 2
PIF_VALUE: 17
PIF_VALUE: 15
PIF_VALUE: 1
PIF_VALUE: 17
PIF_VALUE: 0
PIF_VALUE: 17
PIF_VALUE: 22
PIF_VALUE: 1
PIF_VALUE: 0
PIF_VALUE: 16
PIF_VALUE: 15
PIF_VALUE: 18
PIF_VALUE: 17
PIF_VALUE: 18
PIF_VALUE: 1
PIF_VALUE: 1
PIF_VALUE: 21
PIF_VALUE: 12
PIF_VALUE: 17
PIF_VALUE: 1
PIF_VALUE: 1
PIF_VALUE: 17
PIF_VALUE: 17
PIF_VALUE: 19
PIF_VALUE: 1
PIF_VALUE: 17
PIF_VALUE: 1
PIF_VALUE: 1
PIF_VALUE: 0
PIF_VALUE: 1
PIF_VALUE: 16
PIF_VALUE: 16
PIF_VALUE: 17
PIF_VALUE: 17
PIF_VALUE: 13
PIF_VALUE: 22
PIF_VALUE: 2
PIF_VALUE: 21
PIF_VALUE: 14
PIF_VALUE: 1
PIF_VALUE: 18
PIF_VALUE: 2
PIF_VALUE: 17
PIF_VALUE: 23
PIF_VALUE: 18
PIF_VALUE: 17
PIF_VALUE: 16
PIF_VALUE: 18
PIF_VALUE: 15
PIF_VALUE: 1
PIF_VALUE: 1
PIF_VALUE: 20
PIF_VALUE: 15
PIF_VALUE: 1
PIF_VALUE: 1
PIF_VALUE: 17
PIF_VALUE: 1
PIF_VALUE: 17
PIF_VALUE: 20
PIF_VALUE: 17
PIF_VALUE: 17
PIF_VALUE: 23
PIF_VALUE: 21
PIF_VALUE: 1
PIF_VALUE: 3
PIF_VALUE: 15

## 2021-06-09 ASSESSMENT — ENCOUNTER SYMPTOMS: SHORTNESS OF BREATH: 1

## 2021-06-09 ASSESSMENT — PAIN DESCRIPTION - DESCRIPTORS: DESCRIPTORS: ACHING;DISCOMFORT

## 2021-06-09 ASSESSMENT — PAIN DESCRIPTION - ORIENTATION: ORIENTATION: MID

## 2021-06-09 ASSESSMENT — PAIN SCALES - GENERAL
PAINLEVEL_OUTOF10: 0
PAINLEVEL_OUTOF10: 4
PAINLEVEL_OUTOF10: 0
PAINLEVEL_OUTOF10: 5

## 2021-06-09 ASSESSMENT — PAIN DESCRIPTION - LOCATION: LOCATION: CHEST

## 2021-06-09 ASSESSMENT — PAIN DESCRIPTION - FREQUENCY: FREQUENCY: INTERMITTENT

## 2021-06-09 ASSESSMENT — PAIN DESCRIPTION - PAIN TYPE: TYPE: SURGICAL PAIN

## 2021-06-09 ASSESSMENT — COPD QUESTIONNAIRES: CAT_SEVERITY: MILD

## 2021-06-09 NOTE — PROGRESS NOTES
[facility] [address]    Name: Roda Lesch    Age: 78 y.o. PCP: No primary care provider on file. Date of Admission: 6/7/2021  1:23 PM    Date of Service: 6/9/2021    Chief Complaint: Follow-up for CAD  CABG 6/9/2021: LIMA>LAD,VG>ramus>OM, VG>PDA    Interim History:  . Sent by Dr Evelio Subramanian for cath from [facility]. Did have multivessel disease had CABG 6/9/21 X4  with Dr Natty Blanchard. No issues from cath. Issues with hypotension- received albumin and on levophed. Currently on vent. Telemetry personally reviewed and showed sinus      Review of Systems: On vent    Problem List:  Active Problems:    CAD in native artery  Resolved Problems:    * No resolved hospital problems. *      Past Medical History:  Past Medical History:   Diagnosis Date    CAD in native artery 6/7/2021    COPD (chronic obstructive pulmonary disease) (HCC)     Hyperlipidemia     Hypertension        Allergies:   Allergies   Allergen Reactions    Tramadol Nausea Only       Current Medications:  Current Facility-Administered Medications   Medication Dose Route Frequency Provider Last Rate Last Admin    0.9 % sodium chloride infusion  30 mL/hr Intravenous Continuous Sheran Fontan, DO 30 mL/hr at 06/09/21 1236 30 mL/hr at 06/09/21 1236    sodium chloride flush 0.9 % injection 10 mL  10 mL Intravenous 2 times per day Sheran Fontan, DO        sodium chloride flush 0.9 % injection 10 mL  10 mL Intravenous PRN Sheran Fontan, DO        0.9 % sodium chloride infusion  25 mL Intravenous PRN Sheran Fontan, DO        ondansetron [facility] injection 4 mg  4 mg Intravenous Q8H PRN Sheran Fontan, DO        [START ON 6/10/2021] aspirin EC tablet 81 mg  81 mg Oral Daily Orestes Abad DO        acetaminophen (TYLENOL) tablet 650 mg  650 mg Oral Q4H PRN Sheran Fontan, DO        acetaminophen (TYLENOL) suppository 650 mg  650 mg Rectal Q4H PRN Sheran Fontan, DO        oxyCODONE (ROXICODONE) immediate release tablet 5 mg  5 mg Oral Q4H PRN So Candy, DO        Or    oxyCODONE (ROXICODONE) immediate release tablet 10 mg  10 mg Oral Q4H PRN So Candy, DO        fentaNYL (SUBLIMAZE) injection 25 mcg  25 mcg Intravenous Q1H PRN So Candy, DO   25 mcg at 06/09/21 1318    Or    fentaNYL (SUBLIMAZE) injection 50 mcg  50 mcg Intravenous Q1H PRN So Candy, DO        meperidine (DEMEROL) injection 25 mg  25 mg Intravenous Once PRN So Candy, DO        chlorhexidine (PERIDEX) 0.12 % solution 15 mL  15 mL Mouth/Throat BID So Candy, DO   15 mL at 06/09/21 1300    [START ON 6/10/2021] magnesium oxide (MAG-OX) tablet 400 mg  400 mg Oral Daily So Candy, DO        mupirocin (BACTROBAN) 2 % ointment   Nasal BID So Candy, DO        propofol injection  10 mcg/kg/min Intravenous Continuous PRN So Candy, DO 5.4 mL/hr at 06/09/21 1252 20 mcg/kg/min at 06/09/21 1252    sennosides-docusate sodium (SENOKOT-S) 8.6-50 MG tablet 1 tablet  1 tablet Oral BID So Candy, DO        magnesium hydroxide (MILK OF MAGNESIA) 400 MG/5ML suspension 30 mL  30 mL Oral Daily PRN So Candy, DO        [START ON 6/10/2021] pantoprazole (PROTONIX) tablet 40 mg  40 mg Oral Daily So Candy, DO        ceFAZolin (ANCEF) 2000 mg in sterile water 20 mL IV syringe  2,000 mg Intravenous Q8H So Candy, DO        potassium chloride 20 mEq/50 mL IVPB (Central Line)  20 mEq Intravenous PRN So Candy, DO        magnesium sulfate 2000 mg in 50 mL IVPB premix  2,000 mg Intravenous PRN So Candy, DO        ipratropium-albuterol (DUONEB) nebulizer solution 1 ampule  1 ampule Inhalation Q4H BRITTANY Abad, DO        albumin human 5 % IV solution 25 g  25 g Intravenous PRN So Candy, DO   Stopped at 06/09/21 1335    0.9 % sodium chloride bolus  250 mL Intravenous Continuous PRN So Snyder DO        norepinephrine (LEVOPHED) 16 mg in dextrose 5 % 250 mL infusion  2 mcg/min Intravenous Continuous PRN Loel Quest, DO 5.6 mL/hr at 06/09/21 1321 6 mcg/min at 06/09/21 1321    clevidipine (CLEVIPREX) infusion  2 mg/hr Intravenous Continuous PRN Loel Quest, DO        insulin regular (HUMULIN R;NOVOLIN R) 100 Units in sodium chloride 0.9 % 100 mL infusion  1 Units/hr Intravenous Continuous Loel Quest, DO        [START ON 6/10/2021] insulin glargine (LANTUS) injection vial 7 Units  0.15 Units/kg Subcutaneous Nightly Orestes Abad DO        glucose (GLUTOSE) 40 % oral gel 15 g  15 g Oral PRN Loel Quest, DO        dextrose 50 % IV solution  12.5 g Intravenous PRN Loel Quest, DO        glucagon (rDNA) injection 1 mg  1 mg Intramuscular PRN Selena Abad, DO        dextrose 5 % solution  100 mL/hr Intravenous PRN Loel Quest, DO        calcium gluconate 1,000 mg in dextrose 5 % 100 mL IVPB  1,000 mg Intravenous PRN Loel Quest, DO        [START ON 6/10/2021] clopidogrel (PLAVIX) tablet 75 mg  75 mg Oral Daily Orestes Abad DO        insulin lispro (HUMALOG) injection vial 0-18 Units  0-18 Units Subcutaneous Q4H Selena Abad DO        famotidine (PEPCID) tablet 20 mg  20 mg Oral Daily Walter Bernstein DO        mupirocin (BACTROBAN) 2 % ointment   Nasal BID Loel Quest, DO   Given at 06/08/21 2113    atorvastatin (LIPITOR) tablet 80 mg  80 mg Oral Nightly Loel Quest, DO   80 mg at 06/08/21 2021      sodium chloride 30 mL/hr (06/09/21 1236)    sodium chloride      propofol 20 mcg/kg/min (06/09/21 1252)    sodium chloride      norepinephrine 6 mcg/min (06/09/21 1321)    clevidipine      insulin      dextrose         Physical Exam:  BP (!) 148/67   Pulse 88   Temp 96.5 °F (35.8 °C) (Temporal)   Resp 18   Ht 5' 1.5\" (1.562 m)   Wt 110 lb 0.2 oz (49.9 kg)   SpO2 100%   BMI 20.45 kg/m²   Weight change: -5 lb 12.8 oz (-2.631 kg)  Wt Readings from Last 3 Encounters:   06/09/21 110 lb 0.2 oz (49.9 kg)     General: s/p CABG on vent and pressor sedated  Neck: No JVD, thyromegaly,  CV: RRR  Resp: clear unlabored respirations on vent  Abdomen:  nondistended,   Extremities: no cyanosis, clubbing, or edema. Skin: Warm and dry, no rashes or lesions  Neuro:sedated    Intake/Output:    Intake/Output Summary (Last 24 hours) at 6/9/2021 1323  Last data filed at 6/9/2021 1300  Gross per 24 hour   Intake 3890 ml   Output 2895 ml   Net 995 ml     I/O this shift:  In: 1327 [I.V.:2100; Blood:930; IV Piggyback:500]  Out: 4994 [Urine:1045; Blood:1400; Chest Tube:50]    Laboratory Tests:  Last 3 CBC:  Recent Labs     06/08/21  0554 06/09/21  1220   WBC 11.7* 13.1*   RBC 4.67 3.73   HGB 13.3 10.7*   HCT 42.2 33.1*   MCV 90.4 88.7   MCH 28.5 28.7   MCHC 31.5* 32.3   RDW 14.1 14.4    90*   MPV 11.5 10.7       Last 3 CMP:    Recent Labs     06/08/21  0547 06/08/21  1005 06/09/21  1045 06/09/21  1220 06/09/21  1313    140  --  138  --    K 4.1 4.2 3.9 3.8 4.03    104  --  107  --    CO2 22 20*  --  23  --    BUN 15 14  --  12  --    CREATININE 0.8 0.8  --  0.8  --    GLUCOSE 106* 112*  --  129*  --    CALCIUM 9.3 9.5  --  8.6  --    PROT  --  7.4  --   --   --    LABALBU  --  4.2  --   --   --    BILITOT  --  0.4  --   --   --    ALKPHOS  --  130*  --   --   --    AST  --  82*  --   --   --    ALT  --  73*  --   --   --        Last 3 Mag/Phos:  Recent Labs     06/09/21  1220   MG 2.4       Last 3 CK, CKMB, Troponin  No results for input(s): CKTOTAL, CKMB, TROPONINI in the last 72 hours. Last 3 BNP:  No results for input(s): BNP in the last 72 hours. No results found for: BNP    Last 3 Glucose:     Recent Labs     06/08/21  0547 06/08/21  1005 06/09/21  1220   GLUCOSE 106* 112* 129*       Last 3 Coags:  Recent Labs     06/08/21  1005 06/09/21  1220   PROTIME 11.0 15.6*   INR 1.0 1.4     Lab Results   Component Value Date    PROTIME 15.6 06/09/2021    INR 1.4 06/09/2021       Last 3 Lipid Panel:  No results for input(s): LDLCALC, HDL, TRIG in the last 72 hours.     Invalid input(s): CHLPL  No results found for: LDLCALC  No results found for: HDL  No results found for: TRIG  No components found for: CHLPL    TSH:  No results for input(s): TSH in the last 72 hours. No results found for: TSH        Radiology:  CT CHEST WO CONTRAST   Final Result   Minimal punctate calcification of the aortic root. Otherwise the ascending   thoracic aorta is normal without significant calcification. Advanced centrilobular emphysema and coronary artery calcification. VL KOSTA BILATERAL LIMITED 1-2 LEVELS   Final Result      US CAROTID ARTERY BILATERAL   Final Result   Atherosclerotic disease. No hemodynamically significant stenosis is   identified   Estimated stenosis by NASCET criteria in the proximal right carotid   artery is between 0% and 49%. Estimated stenosis by NASCET criteria in the proximal left carotid   artery is between 0% and 49%. XR CHEST PORTABLE    (Results Pending)   XR CHEST PORTABLE    (Results Pending)           ASSESSMENT / PLAN:  1. CAD with multivessel disease s/p CABGX4 on  6/9/21. Continue present supportive post op carecare.    2 Hyperlipidemia- on statin        Preet Mclaughlin MD, MD, [address] [place] [address] at [facility]    Electronically signed by Preet Mclaughlin MD on 6/9/2021 at 1:23 PM

## 2021-06-09 NOTE — ANESTHESIA POSTPROCEDURE EVALUATION
Department of Anesthesiology  Postprocedure Note    Patient: Emelina Hernandez  MRN: [id number]  YOB: 1950  Date of evaluation: 6/9/2021  Time:  12:32 PM     Procedure Summary     Date: 06/09/21 Room / Location: [address] [place] [address] / [facility]    Anesthesia Start: 0630 Anesthesia Stop: 8079    Procedure: CABG CORONARY ARTERY BYPASS, AVELINA,  BUCKBERG (N/A ) Diagnosis: (/)    Surgeons: Sheridan Leonard DO Responsible Provider: Kathleen Melissa MD    Anesthesia Type: general ASA Status: 4          Anesthesia Type: general    Frank Phase I:      Frank Phase II:      Last vitals: Reviewed and per EMR flowsheets.        Anesthesia Post Evaluation    Patient location during evaluation: ICU  Patient participation: complete - patient cannot participate  Level of consciousness: sedated and ventilated  Pain score: 0  Airway patency: patent  Nausea & Vomiting: no nausea  Complications: no  Cardiovascular status: hemodynamically stable  Respiratory status: ventilator  Hydration status: stable

## 2021-06-09 NOTE — PROGRESS NOTES
Order to extubate reviewed by this RT. Pt alert and following commands. Pt ETT  suctioned for no secretions.  balloon deflated and pt extubated to 4 lpm nasal cannula.  FiO2 increased to 5 lpm. Pt resting comfortably with stable vital signs as follows:     bpm  RR 14 bpm  /47  97% on 5 lpm nasal cannula

## 2021-06-09 NOTE — ANESTHESIA PROCEDURE NOTES
Arterial Line:    An arterial line was placed using surface landmarks, in the OR for the following indication(s): continuous blood pressure monitoring and blood sampling needed. A 20 gauge (size), 5 cm (length), Arrow (type) catheter was placed, Seldinger technique not used, into the right brachial artery, secured by tape and Tegaderm. Anesthesia type: Local  Local infiltration: Injection    Events:  patient tolerated procedure well with no complications. 6/9/2021 7:14 AM  Anesthesiologist: Misty Gomes MD  Resident/CRNA: NOMAN Oliver CRNA  Performed: Anesthesiologist   Preanesthetic Checklist  Completed: patient identified, IV checked, site marked, risks and benefits discussed, surgical consent, monitors and equipment checked, pre-op evaluation, timeout performed, anesthesia consent given, oxygen available and patient being monitored
Central Venous Line:    A central venous line was placed using ultrasound guidance, in the OR for the following indication(s): central venous access and CVP monitoring. Sterility preparation included the following: hand hygiene performed prior to procedure, maximum sterile barriers used and sterile technique used to drape from head to toe. The patient was placed in Trendelenburg position. The right internal jugular vein was prepped. The site was prepped with Chloraprep. A 9 Fr (size), 10 (length), slick was placed. During the procedure, the following specific steps were taken: target vein identified, needle advanced into vein and blood aspirated and guidewire advanced into vein. Intravenous verification was obtained by ultrasound and venous blood return. Post insertion care included: all ports aspirated, all ports flushed easily, guidewire removed intact, Biopatch applied, line sutured in place and dressing applied. During the procedure the patient experienced: patient tolerated procedure well with no complications.       Insertion site scrubbed per usage guidelines?: Yes  Skin prep agent dried for 3 minutes prior to procedure?:yes  Anesthesia type: local..No  Staffing  Performed: Anesthesiologist   Anesthesiologist: Destini Cohen MD  Preanesthetic Checklist  Completed: patient identified, IV checked, site marked, risks and benefits discussed, surgical consent, monitors and equipment checked, pre-op evaluation, timeout performed, anesthesia consent given, oxygen available and patient being monitored
appendage normal.      Septa    Atrial Septum:  Intra-atrial septal morphology normal.      Ventricular Septum:  Intra-ventricular septum morphology normal.      Diastolic Function Measurements:  Diastolic Dysfunction Grade= indeterminate  E= ms  A= ms  E/A Ratio=   DT= ms  S/D=  IVRT=    Other Findings  Pericardium:  normal      Anesthesia Information  Performed Personally  Anesthesiologist:  Rigo Padilla MD      Echocardiogram Comments:       S/P CABG, preserved LV and RV function, some hypokinesia of posterior segments (mid and basal), no LV air, report given to Dr. Daniel Tena intraoperatively.

## 2021-06-09 NOTE — PROGRESS NOTES
CVICU Admission Note    Name: Tino Joya  MRN: [id number]    CC: Postoperative Critical Care Management     Indication for Surgery/Procedure: MVCAD, NSTEMI  LVEF: NML    RVF:  NML    Important/Relevant PMH/PSH: HTN, HLD, COPD    Procedure/Surgeries: 6/9/2021 CABG x4 (LIMA-LAD, UOC-Stlof-PX sequential, SVG-PDA)  EVH  KLS plating     Pacing wires: Ventricular       Physical Exam:    BP (!) 148/67   Pulse 67   Temp 96.5 °F (35.8 °C) (Temporal)   Resp 18   Ht 5' 1.5\" (1.562 m)   Wt 110 lb 0.2 oz (49.9 kg)   SpO2 100%   BMI 20.45 kg/m²     Recent Labs     06/09/21  1220   WBC 13.1*   RBC 3.73   HGB 10.7*   HCT 33.1*   MCV 88.7   MCH 28.7   MCHC 32.3   RDW 14.4   PLT 90*   MPV 10.7     Recent Labs     06/09/21  1220 06/09/21  1313     --    K 3.8 4.03     --    CO2 23  --    BUN 12  --    CREATININE 0.8  --    GLUCOSE 129*  --    CALCIUM 8.6  --          Post operative CXR:       Atelectasis, No pneumothorax noted, increased vascular markings bilateral, No significant pleural effusion. ETT/lines/drains appear to be in proper position. Final Radiology report pending. General Appearance: Arrived to ICU on ventilator, norepi for BP support   Eyes: PERRL  Pulmonary: Diminished bibasilar. No wheezes, no accessory muscle use noted   Ventilator: Mode: AC/VC, 60 FiO2, 6 PEEP, 375 Vt   Cardiovascular: RRR, no heaves or thrills palpated   Telemetry: SR  Abdomen: Soft, OG to LIWS   Extremities: Palpable DP pulses BLE, Right radial +signals, left radial palpable. no edema   Neurologic/Psych: Sedated   Skin: Warm and dry   Incision: MSI with stephani dressing intact    Assessment/Plan: Day of Surgery     1. MVCAD S/p CABGx4   - DAPT, Lipitor   - Ancef  - Monitor chest tube output and hemodynamics closely    2.  Acute Pulmonary Insufficiency Following Surgery    - Expected 2/2 surgery  - Intubated on ventilator  - Wean vent settings and extubate patient once awake, following commands, MART, and no signs of bleeding  - Nebs   - ABGs per protocol and PRN     3. Postoperative Hypotension   -On Norepi, wean off as able, target maps >65. IVF prn     4. Acute Post Operative Pain   - PRN fentanyl for pain management until able to take PO     This patient has a high probability of sudden deterioration, which requires preparedness to urgently intervene. I participated in the decision making process and managed the direct care of the patient that required frequent assessment and treatment. I personally spent 38 minutes of critical care time treating the patient.      Electronically signed by NOMAN Krause CNP on 6/9/2021 at 1:41 PM

## 2021-06-09 NOTE — BRIEF OP NOTE
Brief Postoperative Note      Patient: Agustín Aparicio  YOB: 1950  MRN: [id number]    Date of Procedure: 6/9/2021    Pre-Op Diagnosis: MVCAD, NSTEMI     Post-Op Diagnosis: Same       Procedure(s):  CABG x4 (LIMA-LAD, QYS-Xhlic-UD sequential, SVG-PDA)  KLS plating     Surgeon(s):  Sanjuana Espinal DO    Assistant:  Physician Assistant: ELLIS Richardson Junior; Sunita Frank PA-C    Anesthesia: General    Estimated Blood Loss (mL): less than 50     Complications: None    Specimens:   * No specimens in log *    Implants:  Implant Name Type Inv. Item Serial No.  Lot No. LRB No. Used Action   IMPLANT HUM TISS [id number] DIA3-6MM SAPH VEIN CRYOPRESERVED - [id number]  IMPLANT HUM TISS [id number] DIA3-6MM SAPH VEIN CRYOPRESERVED [id number] [employer]WD  N/A 1 Implanted   PLATE BONE 3.4KE THICKNESS STRNL LCK 6 H CP TI  PLATE BONE 7.7SV THICKNESS STRNL LCK 6 H CP TI  KLS EMMANUEL LP-WD  N/A 1 Implanted   PLATE LCK STRNL 8-H LAD T 1.8MM  PLATE LCK STRNL 8-H LAD T 1.8MM  KLS EMMANUEL LP-WD  N/A 1 Implanted   SCREW BNE L9MM DIA2. 3MM THOR STRNL TI LULI DRL FREE LEV 1  SCREW BNE L9MM DIA2. 3MM THOR STRNL TI LULI DRL FREE LEV 1  KLS EMMANUEL LP-WD  N/A 14 Implanted         Drains:   Chest Tube 1 Mediastinal 19 Beninese (Active)   Dressing Status Clean;Dry; Intact 06/09/21 1053   Dressing Type Dry dressing 06/09/21 1053       Chest Tube 2 Mediastinal 19 Beninese (Active)   Dressing Status Clean;Dry; Intact 06/09/21 1053   Dressing Type Dry dressing 06/09/21 1053       Chest Tube 3 Pleural 19 [place]inese (Active)   Dressing Status Clean;Dry; Intact 06/09/21 1054   Dressing Type Dry dressing 06/09/21 1054       Urethral Catheter Temperature probe 16 fr (Active)         Electronically signed by Sanjuana Espinal DO on 6/9/2021 at 11:49 AM

## 2021-06-09 NOTE — PROGRESS NOTES
Hospitalist Progress Note      SYNOPSIS: Patient admitted on 2021 for unstable angina. Cardiac cath on  revealed multivessel CAD needing surgical revascularization. SUBJECTIVE:    Patient seen and examined  Records reviewed. Seen in CVICU post CABG x 4    Stable overnight. No other overnight issues reported. Temp (24hrs), Av.8 °F (35.4 °C), Min:91.2 °F (32.9 °C), Max:98.8 °F (37.1 °C)    DIET: No diet orders on file  CODE: Full Code    Intake/Output Summary (Last 24 hours) at 2021 1619  Last data filed at 2021 1600  Gross per 24 hour   Intake 3650 ml   Output 3135 ml   Net 515 ml       OBJECTIVE:    BP (!) 148/67   Pulse 103   Temp 97.9 °F (36.6 °C) (Temporal)   Resp 22   Ht 5' 1.5\" (1.562 m)   Wt 110 lb 0.2 oz (49.9 kg)   SpO2 100%   BMI 20.45 kg/m²     General appearance: Intubated / sedated lightly  HEENT:  Conjunctivae/corneas clear. Neck: Supple. No jugular venous distention. Respiratory: Clear to auscultation bilaterally, normal respiratory effort  Cardiovascular: Regular rate rhythm, normal S1-S2  Abdomen: Soft, nontender, nondistended  Musculoskeletal: No clubbing, cyanosis, no bilateral lower extremity edema. Brisk capillary refill. Skin:  No rashes  on visible skin  Neurologic: awake    ASSESSMENT:    Exertional chest pain  Multivessel CAD  Hypertension, hyperlipidemia  Extobacco smoker  Prediabetes and A1c of 6.0  Hyperlipidemia         PLAN:    CABG x4 today  Seen in CVICU  Critical care per team  Will follow patient peripherally.     DISPOSITION: Pending further course    Medications:  REVIEWED DAILY    Infusion Medications    sodium chloride 30 mL/hr (21 1236)    sodium chloride      propofol Stopped (21 1613)    sodium chloride      norepinephrine 4 mcg/min (21 1454)    clevidipine      insulin      dextrose       Scheduled Medications    sodium chloride flush  10 mL Intravenous 2 times per day    [START ON 6/10/2021] aspirin  81 mg Oral Daily    chlorhexidine  15 mL Mouth/Throat BID    [START ON 6/10/2021] magnesium oxide  400 mg Oral Daily    mupirocin   Nasal BID    sennosides-docusate sodium  1 tablet Oral BID    [START ON 6/10/2021] pantoprazole  40 mg Oral Daily    ceFAZolin (ANCEF) IVPB  2,000 mg Intravenous Q8H    ipratropium-albuterol  1 ampule Inhalation Q4H WA    [START ON 6/10/2021] clopidogrel  75 mg Oral Daily    insulin lispro  0-18 Units Subcutaneous Q4H    famotidine  20 mg Oral Daily    mupirocin   Nasal BID    atorvastatin  80 mg Oral Nightly     PRN Meds: sodium chloride flush, sodium chloride, ondansetron, acetaminophen, acetaminophen, oxyCODONE **OR** oxyCODONE HCl, fentanNYL **OR** fentanNYL, propofol, magnesium hydroxide, potassium chloride, magnesium sulfate, albumin human, sodium chloride, norepinephrine, clevidipine, glucose, dextrose, glucagon (rDNA), dextrose, calcium gluconate IVPB    Labs:     Recent Labs     06/08/21  0554 06/09/21  1220   WBC 11.7* 13.1*   HGB 13.3 10.7*   HCT 42.2 33.1*    90*       Recent Labs     06/08/21  0547 06/08/21  1005 06/09/21  1220 06/09/21  1313 06/09/21  1416 06/09/21  1546    140 138  --   --   --    K 4.1 4.2 3.8 4.03 4.41 4.09    104 107  --   --   --    CO2 22 20* 23  --   --   --    BUN 15 14 12  --   --   --    CREATININE 0.8 0.8 0.8  --   --   --    CALCIUM 9.3 9.5 8.6  --   --   --        Recent Labs     06/08/21  1005   PROT 7.4   ALKPHOS 130*   ALT 73*   AST 82*   BILITOT 0.4       Recent Labs     06/08/21  1005 06/09/21  1220   INR 1.0 1.4       No results for input(s): CKTOTAL, TROPONINI in the last 72 hours.     Chronic labs:    Lab Results   Component Value Date    INR 1.4 06/09/2021    LABA1C 6.0 (H) 06/08/2021       Radiology: REVIEWED DAILY    +++++++++++++++++++++++++++++++++++++++++++++++++  Nessa Cruz MD  [facility] Physician - [address], [place]  +++++++++++++++++++++++++++++++++++++++++++++++++  NOTE: This report was transcribed using voice recognition software. Every effort was made to ensure accuracy; however, inadvertent computerized transcription errors may be present.

## 2021-06-09 NOTE — TELEPHONE ENCOUNTER
Called pt to give message from Dr. Terrell Chicas, and suggest Dr. Divya Wright in [place] to see if she would like to schedule with her. A gentleman answered her phone and stated she is surgery. Told him to just let her know I called and to return call whenever she wants to.

## 2021-06-09 NOTE — PROGRESS NOTES
Renal Function Assessment:    Date Body Weight IBW Adj. Body Weight SCr CrCl Dialysis status   6/9/2021 45 kg 45 kg  0.8 ~48 ml/min N/a       Pharmacy has renally dose-adjusted the following medication(s):    Date Medication Original Dosing Regimen New Dosing Regimen   6/9/2021 famotidine 20 mg twice daily 20 mg once daily           These changes were made per protocol according to the [facility] Renal Function-Based Dose Adjustments Policy    *Please note this dose may need readjusted if your patient's renal function significantly improves. Please contact pharmacy with any questions regarding these changes.     Mata Gerardo PharmD 6/9/2021 9:58 AM

## 2021-06-09 NOTE — FLOWSHEET NOTE
Pt admitted to room 3814 from OR, report received from anesthesia.  Hemodynamic lines leveled and zeroed, VSS

## 2021-06-09 NOTE — ANESTHESIA PRE PROCEDURE
 chlorhexidine (HIBICLENS) 4 % liquid   Topical See Admin Instructions ELLIS Soliman        acetaminophen (TYLENOL) tablet 650 mg  650 mg Oral Q4H PRN Roselyn Broody, DO   650 mg at 06/07/21 1753    atorvastatin (LIPITOR) tablet 80 mg  80 mg Oral Nightly Roselyn Broody, DO   80 mg at 06/07/21 2104    aspirin chewable tablet 81 mg  81 mg Oral Daily Roselyn Broody, DO   81 mg at 06/08/21 1120    metoprolol tartrate (LOPRESSOR) tablet 25 mg  25 mg Oral BID ELLIS Soliman   25 mg at 06/08/21 1122    famotidine (PEPCID) tablet 20 mg  20 mg Oral BID Roselyn Broody, DO   20 mg at 06/08/21 1120    nitroglycerin (NITRO-BID) 2 % ointment 0.5 inch  0.5 inch Topical Q6H Roselyn Broody, DO        melatonin disintegrating tablet 5 mg  5 mg Oral Nightly PRN Henrique Pitts MD           Allergies: Allergies   Allergen Reactions    Tramadol Nausea Only       Problem List:    Patient Active Problem List   Diagnosis Code    CAD in native artery I25.10       Past Medical History:        Diagnosis Date    CAD in native artery 6/7/2021    COPD (chronic obstructive pulmonary disease) ([facility] [address]     Hyperlipidemia     Hypertension        Past Surgical History:  No past surgical history on file.     Social History:    Social History     Tobacco Use    Smoking status: Not on file   Substance Use Topics    Alcohol use: Not on file                                Counseling given: Not Answered      Vital Signs (Current):   Vitals:    06/08/21 0900 06/08/21 1120 06/08/21 1123 06/08/21 1125   BP:  135/64 (!) 153/77 (!) 155/85   Pulse:    95   Resp:    16   Temp:    36.7 °C (98 °F)   TempSrc:    Temporal   SpO2:    99%   Weight: 99 lb 3.2 oz (45 kg)      Height:                                                  BP Readings from Last 3 Encounters:   06/08/21 (!) 155/85       NPO Status:  Instructed to remain NPO after midnight DOS                                                                                   CT Chest WO Contrast 6/8/2021  FINDINGS:   There is borderline cardiac size with tiny pericardial effusion.  There is   coronary artery calcification. Fontana Feast is minimal calcification of the aortic   root.  The ascending thoracic aorta is otherwise normal measuring 2.9 x 2.8   cm without calcification.  There is calcification of the aortic arch and   descending thoracic aorta.  There is advanced centrilobular emphysema. Jeannene Anon   is of normal architecture.  Sludge and stones are suspected in the   gallbladder.  Degenerative changes are identified in the thoracic spine.           Impression   Minimal punctate calcification of the aortic root.  Otherwise the ascending   thoracic aorta is normal without significant calcification.       Advanced centrilobular emphysema and coronary artery calcification.               US Carotid Artery 6/8/2021      Impression   Atherosclerotic disease. No hemodynamically significant stenosis is   identified   Estimated stenosis by NASCET criteria in the proximal right carotid   artery is between 0% and 49%. Estimated stenosis by NASCET criteria in the proximal left carotid   artery is between 0% and 49%.               Diagnostic Cardiac Cath 6/7/2021  Angiographic Results/findings:   Left Main: Distal diffuse 50%.  Moderately calcified. LAD: Severely tortuous.  Ostial 75 to 80%, mid 50%   Cx: Severely tortuous.  Proximal to mid diffuse 80%, distal   diffuse 75 to 80%   OM1: Ostial 90%   OM2: Very small vessel. Ramus: Bifurcating vessel.  Ostial to proximal diffuse 80%.     Proximal mid diffuse 40%.  There is an anterior branch ostial 70%   and proximal diffuse 75%.  The lateral branch bifurcation has an   ostial 95% stenosis. RCA: Dominant.  Heavily calcified.  Tortuous.  Mid diffuse 85 to   90%, distal bifurcation subtotal chronic occlusion   PDA: Ostial subtotal occlusion as part of the bifurcation   subtotal occlusion. PLB: Ostial 95% as part of the bifurcation lesion.    LV: Proximal inferior hypokinesis.  Ejection fraction 55%     BMI:   Wt Readings from Last 3 Encounters:   06/08/21 99 lb 3.2 oz (45 kg)     Body mass index is 18.44 kg/m². CBC:   Lab Results   Component Value Date    WBC 11.7 06/08/2021    RBC 4.67 06/08/2021    HGB 13.3 06/08/2021    HCT 42.2 06/08/2021    MCV 90.4 06/08/2021    RDW 14.1 06/08/2021     06/08/2021       CMP:   Lab Results   Component Value Date     06/08/2021    K 4.2 06/08/2021    K 4.1 06/08/2021     06/08/2021    CO2 20 06/08/2021    BUN 14 06/08/2021    CREATININE 0.8 06/08/2021    GFRAA >60 06/08/2021    LABGLOM >60 06/08/2021    GLUCOSE 112 06/08/2021    PROT 7.4 06/08/2021    CALCIUM 9.5 06/08/2021    BILITOT 0.4 06/08/2021    ALKPHOS 130 06/08/2021    AST 82 06/08/2021    ALT 73 06/08/2021       POC Tests: No results for input(s): POCGLU, POCNA, POCK, POCCL, POCBUN, POCHEMO, POCHCT in the last 72 hours. Coags:   Lab Results   Component Value Date    PROTIME 11.0 06/08/2021    INR 1.0 06/08/2021    APTT 23.3 06/08/2021       HCG (If Applicable): No results found for: PREGTESTUR, PREGSERUM, HCG, HCGQUANT     ABGs: No results found for: PHART, PO2ART, HIL0NVE, CYA6BII, BEART, N9TBTILF     Type & Screen (If Applicable):  No results found for: LABABO, LABRH    Drug/Infectious Status (If Applicable):  No results found for: HIV, HEPCAB    COVID-19 Screening (If Applicable): No results found for: COVID19        Anesthesia Evaluation  Patient summary reviewed no history of anesthetic complications:   Airway: Mallampati: II  TM distance: <3 FB   Neck ROM: full  Mouth opening: > = 3 FB Dental:      Comment: Patient denies loose. Pulmonary: breath sounds clear to auscultation  (+) COPD: mild,  shortness of breath (SOB with exertion):                            ROS comment: Former smoker - 1 pack a day for 30 yrs. Quit 19 yrs ago.    Cardiovascular:    (+) hypertension:, angina: with exertion, CAD: obstructive, hyperlipidemia        Rhythm: regular  Rate: normal           Beta Blocker:  Dose within 24 Hrs         Neuro/Psych:   Negative Neuro/Psych ROS              GI/Hepatic/Renal: Neg GI/Hepatic/Renal ROS            Endo/Other: Negative Endo/Other ROS                    Abdominal:           Vascular: negative vascular ROS. Anesthesia Plan      general     ASA 4       Induction: intravenous. arterial line, BIS, central line, CVP and AVELINA  MIPS: Postoperative opioids intended, Prophylactic antiemetics administered and Postoperative ventilation. Anesthetic plan and risks discussed with patient. Use of blood products discussed with patient whom consented to blood products. Plan discussed with attending.                 Sharon Muñoz, RN   6/8/2021

## 2021-06-09 NOTE — OP NOTE
[address]                  [address] [place],  [facility]                                OPERATIVE REPORT    PATIENT NAME: Ana Manrique                     :        1950  MED REC NO:   [id number]                            ROOM:       3814  ACCOUNT NO:   [de-identified]                           ADMIT DATE: 2021  PROVIDER:     Regina Gregory DO    DATE OF PROCEDURE:  2021    PREOPERATIVE DIAGNOSES:  Severe multivessel coronary artery disease and  non-ST-elevation MI.    POSTOPERATIVE DIAGNOSES:  Severe multivessel coronary artery disease and  non-ST-elevation MI. PROCEDURE PERFORMED:  1. Coronary artery bypass grafting x4 using left internal mammary  artery to left anterior descending artery, reverse saphenous vein graft  to ramus intermedius and obtuse marginal branch of the circumflex in a  sequential manner, and reverse saphenous vein graft to the posterior  descending artery. 2.  Rigid sternal fixation with the KLS plating system. SURGEON:  Regina Gregory DO    ASSISTANT:  ELLIS Cohen (after [facility] assisted with all portions of  the procedure in the chest including closing). ANESTHESIA:  General.    ESTIMATED BLOOD LOSS:  Less than 50. COMPLICATIONS:  None. SPECIMENS:  None. DESCRIPTION OF PROCEDURE:  After informed written consent had been  obtained, the patient was brought to the operating room, placed in the  supine position on the operating table. Monitoring lines as well as  Candelario catheter and transesophageal echo probe were inserted after  induction of anesthesia. Preoperative echo demonstrated normal  biventricular function, no valvular abnormalities. The surgical  time-out was held. Preoperative antibiotics were administered. A  standard midline sternotomy incision was carried out. Subcutaneous  tissue was dissected and the sternum was divided with a sternal saw.    Simultaneously, lower extremity endoscopic vein harvest was carried out  in the usual fashion. The left internal mammary artery was dissected  free from the undersurface of the left chest wall with a pedicled  technique. All branches were clipped and divided. Next, the mammary  retractor was removed. Standard sternal retractor was placed. Pericardium was opened and the ascending aorta was palpated and noted to  be free of atherosclerotic disease. Suitable areas for cannulation,  crossclamping and proximal anastomoses were apparent. Next, central  cannulation was commenced in the usual fashion with the ascending aorta,  right atrial cannulas followed by insertion of antegrade and retrograde  cardioplegia lines. We then placed the patient on cardiopulmonary  bypass and inspected the distal targets, which were noted to be  adequate. The lower extremity saphenous veins were noted to be too  small for using bypass based on the intraoperative ultrasound, so we  elected to use a cryopreserved vein. Next, a crossclamp was applied. The heart was arrested with a combination of antegrade and retrograde  cardioplegia. We achieved an excellent arrest and administered  maintenance doses via the retrograde cardioplegia cannula every 15  minutes. First our attention was turned to the lateral wall. There was  1 dominant obtuse marginal branch in the circumflex, which was chosen  for arteriotomy and then underwent end-to-side vein graft anastomosis  with running 7-0 Prolene suture. This was tested and had excellent flow  and also was hemostatic. Next, our attention was turned to the large  ramus intermedius. After arteriotomy, a side-to-side vein graft  anastomosis was created with the same vein graft that was anastomosed to  the obtuse marginal branch on a more proximal portion. This was carried  out with running 7-0 Prolene suture. This was tested and had an  excellent flow and also was hemostatic.   Next, our attention was turned  to the right coronary artery system. There was a suitable target on the  PDA. After arteriotomy, an end-to-side vein graft anastomosis was  created here with a running 7-0 Prolene suture. This was tested and had  an excellent flow and also was hemostatic. Next, our attention was  turned to the midportion of the LAD. After arteriotomy, an end-to-side  LIMA to LAD anastomosis was created here with a running 7-0 Prolene  suture. This was tested and had an excellent flow and also had  excellent runoff to the distal vessel. Next, our attention was turned  to the proximal anastomoses. Two standard aortotomies were fashioned in  the ascending aorta with a 4.8 mm punch and vein grafts were then  measured to length and proximal anastomoses were both carried out with  6-0 Prolene suture. Once this was completed, a warm dose of blood was  administered via the retrograde cardioplegia cannula as a \"hot shot. \"   Once this was completed, the bulldog clamp was removed from left  internal mammary artery. Crossclamp removed from the aorta and the  heart began to reanimate immediately. A temporary ventricular pacing  wire was placed; however, not used secondary to the patient being in  normal sinus rhythm. Next, once all evidence of intracardiac air was  gone, the aortic root vent, retrograde cardioplegia cannulas were then  removed. The patient was weaned from cardiopulmonary bypass without  difficulty and the venous cannula was removed. Protamine was  administered to normalize the ACT, which was verified to be back down to  its baseline and the arterial cannula was then removed. Next, the 3  chest tubes were inserted, 1 in the left pleural space, 2 in the  mediastinal space. The post-bypass echo demonstrated once again normal  biventricular function, no valvular abnormalities. Next, the sponge,  needle and instrument count was noted to be correct and the sternum was  approximated with stainless steel wires.   In addition to this, the  sternum underwent rigid fixation with the KLS plating system with 1  plate in the manubrium and 1 in the body of the sternum. The wound was  then irrigated copiously and closed in multiple layers. The patient  tolerated the procedure well and was transferred to the ICU in stable  condition. The cardiopulmonary bypass time was 98 minutes and the  crossclamp time was 74 minutes.         Lela Espinal DO    D: 06/09/2021 14:24:07       T: 06/09/2021 14:30:33     [email or web address]  Job#: [id number]     Doc#: [id number]    CC:

## 2021-06-09 NOTE — PROCEDURES
[address]                  [address] [facility],  [facility]                               PULMONARY FUNCTION    PATIENT NAME: Dianne Dejesus                     :        1950  MED REC NO:   [id number]                            ROOM:       [facility]  ACCOUNT NO:   [de-identified]                           ADMIT DATE: 2021  PROVIDER:     Odell Johnson DO    DATE OF PROCEDURE:  2021    INDICATIONS:  A 19-year-old  female, 62 inches, 105 pounds. Preoperative assessment and a 30-pack-year smoker. Spirometry reveals airflow obstruction with forced vital capacity 2.30  liters, 85% of predicted and FEV1 of 1.60 L, 76% of predicted with an  FEV1/FVC ratio of 70%. Midflow rates are reduced to 51% of predicted  with a maximum voluntary ventilation of 65 liters per minute, 79% of  predicted. Static lung volumes with slow vital capacity of 2.16 L, 80% of  predicted. Inspiratory capacity of 1.77 liters, 86% of predicted. Expiratory reserve volume 0.39 L, 51% of predicted with diffusion  capacity of 10.05 mL/minute per mmHg which is 46% of predicted. IMPRESSION:  GOLD I COPD, mild airflow obstruction with loss in gas  transfer. Clinical correlation needed.         Raquel Tejeda DO    D: 2021 15:57:36       T: 2021 16:02:53     [email or web address]  Job#: [id number]     Doc#: [id number]    CC:

## 2021-06-10 ENCOUNTER — APPOINTMENT (OUTPATIENT)
Dept: GENERAL RADIOLOGY | Age: 71
DRG: 233 | End: 2021-06-10
Attending: FAMILY MEDICINE
Payer: MEDICARE

## 2021-06-10 LAB
AADO2: 161.9 MMHG
AADO2: 164.9 MMHG
AADO2: 165.9 MMHG
ANION GAP SERPL CALCULATED.3IONS-SCNC: 10 MMOL/L (ref 7–16)
B.E.: -0.6 MMOL/L (ref -3–3)
B.E.: -1.2 MMOL/L (ref -3–3)
B.E.: -1.3 MMOL/L (ref -3–3)
B.E.: -1.6 MMOL/L (ref -3–3)
BUN BLDV-MCNC: 12 MG/DL (ref 6–23)
CALCIUM SERPL-MCNC: 8.3 MG/DL (ref 8.6–10.2)
CHLORIDE BLD-SCNC: 111 MMOL/L (ref 98–107)
CO2: 25 MMOL/L (ref 22–29)
COHB: 0 % (ref 0–1.5)
COHB: 0 % (ref 0–1.5)
COHB: 0.3 % (ref 0–1.5)
COHB: 0.3 % (ref 0–1.5)
CREAT SERPL-MCNC: 0.9 MG/DL (ref 0.5–1)
CRITICAL: ABNORMAL
DATE ANALYZED: ABNORMAL
DATE OF COLLECTION: ABNORMAL
FIO2: 40 %
GFR AFRICAN AMERICAN: >60
GFR NON-AFRICAN AMERICAN: >60 ML/MIN/1.73
GLUCOSE BLD-MCNC: 158 MG/DL (ref 74–99)
HCO3: 22.3 MMOL/L (ref 22–26)
HCO3: 22.4 MMOL/L (ref 22–26)
HCO3: 22.5 MMOL/L (ref 22–26)
HCO3: 24.9 MMOL/L (ref 22–26)
HCT VFR BLD CALC: 30.4 % (ref 34–48)
HEMOGLOBIN: 9.9 G/DL (ref 11.5–15.5)
HHB: 5 % (ref 0–5)
HHB: 5.6 % (ref 0–5)
HHB: 5.6 % (ref 0–5)
HHB: 6 % (ref 0–5)
LAB: ABNORMAL
LACTIC ACID: 1.7 MMOL/L (ref 0.5–2.2)
Lab: ABNORMAL
MAGNESIUM: 1.8 MG/DL (ref 1.6–2.6)
MCH RBC QN AUTO: 28.9 PG (ref 26–35)
MCHC RBC AUTO-ENTMCNC: 32.6 % (ref 32–34.5)
MCV RBC AUTO: 88.9 FL (ref 80–99.9)
METER GLUCOSE: 132 MG/DL (ref 74–99)
METER GLUCOSE: 134 MG/DL (ref 74–99)
METER GLUCOSE: 134 MG/DL (ref 74–99)
METER GLUCOSE: 141 MG/DL (ref 74–99)
METER GLUCOSE: 152 MG/DL (ref 74–99)
METHB: 0.2 % (ref 0–1.5)
METHB: 0.3 % (ref 0–1.5)
MODE: ABNORMAL
MODE: ABNORMAL
MODE: AC
MODE: AC
MRSA CULTURE ONLY: NORMAL
O2 SATURATION: 94 % (ref 92–98.5)
O2 SATURATION: 94.4 % (ref 92–98.5)
O2 SATURATION: 94.4 % (ref 92–98.5)
O2 SATURATION: 95 % (ref 92–98.5)
O2HB: 93.8 % (ref 94–97)
O2HB: 93.8 % (ref 94–97)
O2HB: 94.2 % (ref 94–97)
O2HB: 94.5 % (ref 94–97)
OPERATOR ID: 1632
OPERATOR ID: 421
OPERATOR ID: 5100
OPERATOR ID: 5100
PATIENT TEMP: 37 C
PCO2: 33.8 MMHG (ref 35–45)
PCO2: 33.9 MMHG (ref 35–45)
PCO2: 34.5 MMHG (ref 35–45)
PCO2: 44.2 MMHG (ref 35–45)
PDW BLD-RTO: 14.3 FL (ref 11.5–15)
PEEP/CPAP: 5 CMH2O
PFO2: 1.76 MMHG/%
PFO2: 1.78 MMHG/%
PFO2: 1.84 MMHG/%
PH BLOOD GAS: 7.37 (ref 7.35–7.45)
PH BLOOD GAS: 7.43 (ref 7.35–7.45)
PH BLOOD GAS: 7.44 (ref 7.35–7.45)
PH BLOOD GAS: 7.44 (ref 7.35–7.45)
PLATELET # BLD: 111 E9/L (ref 130–450)
PLATELET CONFIRMATION: NORMAL
PMV BLD AUTO: 10.7 FL (ref 7–12)
PO2: 70.4 MMHG (ref 75–100)
PO2: 71.3 MMHG (ref 75–100)
PO2: 73.6 MMHG (ref 75–100)
PO2: 81.1 MMHG (ref 75–100)
POTASSIUM SERPL-SCNC: 3.91 MMOL/L (ref 3.5–5)
POTASSIUM SERPL-SCNC: 3.98 MMOL/L (ref 3.5–5)
POTASSIUM SERPL-SCNC: 4.2 MMOL/L (ref 3.5–5)
PS: 15 CMH20
RBC # BLD: 3.42 E12/L (ref 3.5–5.5)
RI(T): 2.2
RI(T): 2.31
RI(T): 2.36
RR MECHANICAL: 12 B/MIN
RR MECHANICAL: 12 B/MIN
SODIUM BLD-SCNC: 146 MMOL/L (ref 132–146)
SOURCE, BLOOD GAS: ABNORMAL
THB: 10 G/DL (ref 11.5–16.5)
THB: 10.3 G/DL (ref 11.5–16.5)
THB: 10.8 G/DL (ref 11.5–16.5)
THB: 9.8 G/DL (ref 11.5–16.5)
TIME ANALYZED: 1258
TIME ANALYZED: 414
TIME ANALYZED: 858
TIME ANALYZED: 948
URINE CULTURE, ROUTINE: NORMAL
VT MECHANICAL: 450 ML
VT MECHANICAL: 450 ML
WBC # BLD: 9.4 E9/L (ref 4.5–11.5)

## 2021-06-10 PROCEDURE — 6370000000 HC RX 637 (ALT 250 FOR IP): Performed by: THORACIC SURGERY (CARDIOTHORACIC VASCULAR SURGERY)

## 2021-06-10 PROCEDURE — 94660 CPAP INITIATION&MGMT: CPT

## 2021-06-10 PROCEDURE — 82962 GLUCOSE BLOOD TEST: CPT

## 2021-06-10 PROCEDURE — 80048 BASIC METABOLIC PNL TOTAL CA: CPT

## 2021-06-10 PROCEDURE — 94640 AIRWAY INHALATION TREATMENT: CPT

## 2021-06-10 PROCEDURE — 6370000000 HC RX 637 (ALT 250 FOR IP): Performed by: INTERNAL MEDICINE

## 2021-06-10 PROCEDURE — 71045 X-RAY EXAM CHEST 1 VIEW: CPT

## 2021-06-10 PROCEDURE — 2700000000 HC OXYGEN THERAPY PER DAY

## 2021-06-10 PROCEDURE — 6360000002 HC RX W HCPCS: Performed by: NURSE PRACTITIONER

## 2021-06-10 PROCEDURE — 2000000000 HC ICU R&B

## 2021-06-10 PROCEDURE — 6360000002 HC RX W HCPCS: Performed by: THORACIC SURGERY (CARDIOTHORACIC VASCULAR SURGERY)

## 2021-06-10 PROCEDURE — 6360000002 HC RX W HCPCS: Performed by: PHYSICIAN ASSISTANT

## 2021-06-10 PROCEDURE — 84132 ASSAY OF SERUM POTASSIUM: CPT

## 2021-06-10 PROCEDURE — 83605 ASSAY OF LACTIC ACID: CPT

## 2021-06-10 PROCEDURE — 83735 ASSAY OF MAGNESIUM: CPT

## 2021-06-10 PROCEDURE — P9047 ALBUMIN (HUMAN), 25%, 50ML: HCPCS | Performed by: NURSE PRACTITIONER

## 2021-06-10 PROCEDURE — 32551 INSERTION OF CHEST TUBE: CPT

## 2021-06-10 PROCEDURE — 36415 COLL VENOUS BLD VENIPUNCTURE: CPT

## 2021-06-10 PROCEDURE — 94003 VENT MGMT INPAT SUBQ DAY: CPT

## 2021-06-10 PROCEDURE — 82805 BLOOD GASES W/O2 SATURATION: CPT

## 2021-06-10 PROCEDURE — 99291 CRITICAL CARE FIRST HOUR: CPT | Performed by: NURSE PRACTITIONER

## 2021-06-10 PROCEDURE — 2580000003 HC RX 258: Performed by: THORACIC SURGERY (CARDIOTHORACIC VASCULAR SURGERY)

## 2021-06-10 PROCEDURE — 85027 COMPLETE CBC AUTOMATED: CPT

## 2021-06-10 PROCEDURE — 2580000003 HC RX 258: Performed by: PHYSICIAN ASSISTANT

## 2021-06-10 RX ORDER — ALBUMIN (HUMAN) 12.5 G/50ML
25 SOLUTION INTRAVENOUS ONCE
Status: COMPLETED | OUTPATIENT
Start: 2021-06-10 | End: 2021-06-10

## 2021-06-10 RX ORDER — ALBUMIN (HUMAN) 12.5 G/50ML
50 SOLUTION INTRAVENOUS ONCE
Status: COMPLETED | OUTPATIENT
Start: 2021-06-10 | End: 2021-06-10

## 2021-06-10 RX ADMIN — INSULIN LISPRO 3 UNITS: 100 INJECTION, SOLUTION INTRAVENOUS; SUBCUTANEOUS at 04:18

## 2021-06-10 RX ADMIN — SODIUM CHLORIDE 30 ML/HR: 9 INJECTION, SOLUTION INTRAVENOUS at 00:43

## 2021-06-10 RX ADMIN — ATORVASTATIN CALCIUM 80 MG: 80 TABLET, FILM COATED ORAL at 21:21

## 2021-06-10 RX ADMIN — PIPERACILLIN AND TAZOBACTAM 3375 MG: 3; .375 INJECTION, POWDER, LYOPHILIZED, FOR SOLUTION INTRAVENOUS at 06:48

## 2021-06-10 RX ADMIN — IPRATROPIUM BROMIDE AND ALBUTEROL SULFATE 1 AMPULE: 2.5; .5 SOLUTION RESPIRATORY (INHALATION) at 15:33

## 2021-06-10 RX ADMIN — FENTANYL CITRATE 25 MCG: 50 INJECTION, SOLUTION INTRAMUSCULAR; INTRAVENOUS at 07:35

## 2021-06-10 RX ADMIN — Medication 10 ML: at 07:57

## 2021-06-10 RX ADMIN — FAMOTIDINE 20 MG: 20 TABLET ORAL at 15:08

## 2021-06-10 RX ADMIN — PANTOPRAZOLE SODIUM 40 MG: 40 TABLET, DELAYED RELEASE ORAL at 15:08

## 2021-06-10 RX ADMIN — IPRATROPIUM BROMIDE AND ALBUTEROL SULFATE 1 AMPULE: 2.5; .5 SOLUTION RESPIRATORY (INHALATION) at 05:20

## 2021-06-10 RX ADMIN — OXYCODONE HYDROCHLORIDE 5 MG: 5 TABLET ORAL at 21:20

## 2021-06-10 RX ADMIN — MUPIROCIN: 20 OINTMENT TOPICAL at 07:59

## 2021-06-10 RX ADMIN — MUPIROCIN: 20 OINTMENT TOPICAL at 21:21

## 2021-06-10 RX ADMIN — INSULIN LISPRO 3 UNITS: 100 INJECTION, SOLUTION INTRAVENOUS; SUBCUTANEOUS at 21:31

## 2021-06-10 RX ADMIN — FENTANYL CITRATE 25 MCG: 50 INJECTION, SOLUTION INTRAMUSCULAR; INTRAVENOUS at 23:33

## 2021-06-10 RX ADMIN — IPRATROPIUM BROMIDE AND ALBUTEROL SULFATE 1 AMPULE: 2.5; .5 SOLUTION RESPIRATORY (INHALATION) at 20:30

## 2021-06-10 RX ADMIN — DOCUSATE SODIUM 50 MG AND SENNOSIDES 8.6 MG 1 TABLET: 8.6; 5 TABLET, FILM COATED ORAL at 21:20

## 2021-06-10 RX ADMIN — ALBUMIN (HUMAN) 25 G: 0.25 INJECTION, SOLUTION INTRAVENOUS at 13:29

## 2021-06-10 RX ADMIN — CALCIUM GLUCONATE 1000 MG: 98 INJECTION, SOLUTION INTRAVENOUS at 07:50

## 2021-06-10 RX ADMIN — ALBUMIN (HUMAN) 25 G: 25 SOLUTION INTRAVENOUS at 15:41

## 2021-06-10 RX ADMIN — FENTANYL CITRATE 25 MCG: 50 INJECTION, SOLUTION INTRAMUSCULAR; INTRAVENOUS at 15:18

## 2021-06-10 RX ADMIN — SODIUM CHLORIDE: 9 INJECTION, SOLUTION INTRAVENOUS at 03:05

## 2021-06-10 RX ADMIN — Medication 10 ML: at 21:20

## 2021-06-10 RX ADMIN — FENTANYL CITRATE 25 MCG: 50 INJECTION, SOLUTION INTRAMUSCULAR; INTRAVENOUS at 04:04

## 2021-06-10 RX ADMIN — SODIUM CHLORIDE: 9 INJECTION, SOLUTION INTRAVENOUS at 19:54

## 2021-06-10 RX ADMIN — CALCIUM GLUCONATE 1000 MG: 98 INJECTION, SOLUTION INTRAVENOUS at 00:40

## 2021-06-10 RX ADMIN — FENTANYL CITRATE 25 MCG: 50 INJECTION, SOLUTION INTRAMUSCULAR; INTRAVENOUS at 12:34

## 2021-06-10 RX ADMIN — PIPERACILLIN AND TAZOBACTAM 3375 MG: 3; .375 INJECTION, POWDER, LYOPHILIZED, FOR SOLUTION INTRAVENOUS at 15:08

## 2021-06-10 RX ADMIN — PIPERACILLIN AND TAZOBACTAM 3375 MG: 3; .375 INJECTION, POWDER, LYOPHILIZED, FOR SOLUTION INTRAVENOUS at 23:17

## 2021-06-10 RX ADMIN — IPRATROPIUM BROMIDE AND ALBUTEROL SULFATE 1 AMPULE: 2.5; .5 SOLUTION RESPIRATORY (INHALATION) at 11:53

## 2021-06-10 RX ADMIN — FENTANYL CITRATE 25 MCG: 50 INJECTION, SOLUTION INTRAMUSCULAR; INTRAVENOUS at 19:48

## 2021-06-10 RX ADMIN — MAGNESIUM SULFATE 2000 MG: 2 INJECTION INTRAVENOUS at 06:30

## 2021-06-10 RX ADMIN — 0.12% CHLORHEXIDINE GLUCONATE 15 ML: 1.2 RINSE ORAL at 07:57

## 2021-06-10 RX ADMIN — ALBUMIN (HUMAN) 50 G: 0.25 INJECTION, SOLUTION INTRAVENOUS at 07:57

## 2021-06-10 RX ADMIN — ASPIRIN 81 MG: 81 TABLET, COATED ORAL at 15:08

## 2021-06-10 RX ADMIN — SODIUM CHLORIDE: 9 INJECTION, SOLUTION INTRAVENOUS at 11:40

## 2021-06-10 ASSESSMENT — PAIN SCALES - GENERAL
PAINLEVEL_OUTOF10: 5
PAINLEVEL_OUTOF10: 4
PAINLEVEL_OUTOF10: 0
PAINLEVEL_OUTOF10: 0
PAINLEVEL_OUTOF10: 4
PAINLEVEL_OUTOF10: 6
PAINLEVEL_OUTOF10: 0
PAINLEVEL_OUTOF10: 4
PAINLEVEL_OUTOF10: 0
PAINLEVEL_OUTOF10: 5
PAINLEVEL_OUTOF10: 4
PAINLEVEL_OUTOF10: 0
PAINLEVEL_OUTOF10: 6

## 2021-06-10 ASSESSMENT — PAIN DESCRIPTION - PAIN TYPE
TYPE: SURGICAL PAIN
TYPE: SURGICAL PAIN
TYPE: ACUTE PAIN;SURGICAL PAIN
TYPE: SURGICAL PAIN

## 2021-06-10 ASSESSMENT — PULMONARY FUNCTION TESTS
PIF_VALUE: 22
PIF_VALUE: 16
PIF_VALUE: 22
PIF_VALUE: 23
PIF_VALUE: 22
PIF_VALUE: 21
PIF_VALUE: 17
PIF_VALUE: 21
PIF_VALUE: 23
PIF_VALUE: 20
PIF_VALUE: 21
PIF_VALUE: 21
PIF_VALUE: 22
PIF_VALUE: 22

## 2021-06-10 ASSESSMENT — PAIN DESCRIPTION - DESCRIPTORS
DESCRIPTORS: ACHING;DISCOMFORT
DESCRIPTORS: ACHING;CONSTANT;THROBBING;TENDER
DESCRIPTORS: ACHING;DISCOMFORT

## 2021-06-10 ASSESSMENT — PAIN DESCRIPTION - FREQUENCY
FREQUENCY: INTERMITTENT
FREQUENCY: INTERMITTENT
FREQUENCY: CONTINUOUS

## 2021-06-10 ASSESSMENT — PAIN DESCRIPTION - ONSET: ONSET: ON-GOING

## 2021-06-10 ASSESSMENT — PAIN DESCRIPTION - LOCATION
LOCATION: CHEST
LOCATION: BACK;STERNUM
LOCATION: CHEST

## 2021-06-10 ASSESSMENT — PAIN DESCRIPTION - PROGRESSION
CLINICAL_PROGRESSION: GRADUALLY WORSENING
CLINICAL_PROGRESSION: GRADUALLY WORSENING

## 2021-06-10 ASSESSMENT — PAIN DESCRIPTION - ORIENTATION
ORIENTATION: MID
ORIENTATION: MID;UPPER
ORIENTATION: MID

## 2021-06-10 ASSESSMENT — PAIN - FUNCTIONAL ASSESSMENT: PAIN_FUNCTIONAL_ASSESSMENT: PREVENTS OR INTERFERES SOME ACTIVE ACTIVITIES AND ADLS

## 2021-06-10 NOTE — OP NOTE
[address]                  [address] [place],  [facility]                                OPERATIVE REPORT    PATIENT NAME: Riki Sosa                     :        1950  MED REC NO:   [id number]                            ROOM:       3814  ACCOUNT NO:   [de-identified]                           ADMIT DATE: 2021  PROVIDER:     Martin West DO    DATE OF PROCEDURE:  2021    PREOPERATIVE DIAGNOSIS:  Postoperative hemorrhage, status post open  heart surgery. POSTOPERATIVE DIAGNOSIS:  Postoperative hemorrhage, status post open  heart surgery. PROCEDURES PERFORMED:  1. Re-exploration of mediastinum. 2.  Mediastinal washout. 3.  Evacuation of hematoma. 4.  Control of hemorrhage. SURGEON:  Martin West MD    ASSISTANT:  ELLIS Duran (assisted with all portions of the procedure  in the chest including closing). ANESTHESIA:  General.    BLOOD LOSS:  1 liter. COMPLICATIONS:  None. SPECIMENS:  None. INDICATIONS:  The patient is a 70-year-old female who underwent a  four-vessel bypass earlier in morning and had been recovering well and  weaned from the ventilator and extubated. However, within the past hour  or so, she developed increasing chest tube output, tachycardia and  escalating pressor requirements. A stat CBC was sent, which noted that  she was severely anemic with a hemoglobin of 7, which had previously  been 10.7. Stat chest x-ray was obtained as well which showed a  white-out of the left hemithorax. With this, there was concern for  postoperative bleeding and we emergently returned to the operating room  to re-explore. DESCRIPTION OF PROCEDURE:  After informed consent had been obtained, the  patient was brought to the operating room, placed in supine position on  the operating table.   Monitoring lines as well as a Candelario catheter and  transesophageal echo probe were inserted after induction of anesthesia. During induction of anesthesia, there was some difficulty with placing  the ET tube, causing the patient to have a short period of poor  oxygenation, which resulted in a low blood pressure. However, this was  recognized very quickly by the Anesthesia Department, who replaced the  tube in its correct position and upon ventilating the patient, her vital  signs improved rapidly. The preoperative antibiotics were then  administered and a surgical timeout was held. The previously made  incision was then opened. The sternal wires were removed as well as the  plates and the standard sternal retractor was placed. There was a large  amount of hematoma in the left chest, this was all evacuated. All the  proximal and distal graft sites were inspected in detail and there was  noted to be no active bleeding on the heart itself. Upon further  inspection of the left chest, there was a very small branch coming off  the undersurface of the left subclavian vein inside the left chest which  appeared to be the source of bleeding, this was ligated. This seemed to  control the bleeding very well and further inspection was carried out of  all areas of the mediastinum including the chest wall and no additional  sources of bleeding were noted. Satisfied with this, we then  approximated the sternum with stainless steel wires. In addition,  replaced the KLS plates as well. The patient remained intubated and  transferred back to the intensive care unit. I attempted to call the  patient's  several times; however, he did not answer the phone. I discussed the procedure prior to coming to the operating room with the  patient and then also noted to her that there was no answer from her  family. She said that was okay and that he usually does not answer the  phone. Estimated blood loss was approximately 1 liter.   The patient's  vital signs improved and the Levophed which had been increased over the  last hour or so was then weaned down and the patient was transferred to  the ICU.         Micheal Bush DO    D: 06/10/2021 7:54:24       T: 06/10/2021 8:02:20     [email or web address]  Job#: [id number]     Doc#: [id number]    CC:

## 2021-06-10 NOTE — PROGRESS NOTES
OCCUPATIONAL THERAPY    Date:6/10/2021  Patient Name: Moi Pena  MRN: [id number]  : 1950  Room: [address] [place] [address]              Chart reviewed. Pt on hold this am- returned to OR yesterday with possible extubation today. Will re-attempt at later time. Thank you for consult.     Abby Beltre, OTR/L [id number]

## 2021-06-10 NOTE — PROGRESS NOTES
CVICU Progress Note    Name: Alcides Rouse  MRN: [id number]    CC: Postoperative Critical Care Management     Indication for Surgery/Procedure: MVCAD, NSTEMI  LVEF: NML    RVF:  NML     Important/Relevant PMH/PSH: HTN, HLD, COPD     Procedure/Surgeries:   6/9/2021 CABG x4 (LIMA-LAD, LOB-Khnto-AE sequential, SVG-PDA)    6/11/2021 Exploration of mediastinum, Washout  Control of hemorrhage (small branch of left subclavian vein)     Pacing wires: Ventricular         Intake/Output Summary (Last 24 hours) at 6/10/2021 0948  Last data filed at 6/10/2021 0902  Gross per 24 hour   Intake 7676.57 ml   Output 5665 ml   Net 2011.57 ml       Recent Labs     06/09/21  1915 06/09/21  2240 06/10/21  0410   WBC 11.5 8.5 9.4   HGB 7.0* 9.3* 9.9*   HCT 22.2* 29.0* 30.4*   PLT 87* 96* 111*      Recent Labs     06/09/21  1220 06/09/21  2240 06/09/21  2248 06/10/21  0410 06/10/21  0858    146  --  146  --    K 3.8 3.9 3.76 4.2 3.98    108*  --  111*  --    CO2 23 24  --  25  --    BUN 12 12  --  12  --    CREATININE 0.8 0.9  --  0.9  --    GLUCOSE 129* 220*  --  158*  --    CALCIUM 8.6 7.8*  --  8.3*  --          Physical Exam:    BP (!) 133/59   Pulse 105   Temp 99.5 °F (37.5 °C) (Temporal)   Resp 21   Ht 5' 1.5\" (1.562 m)   Wt 115 lb 4.8 oz (52.3 kg)   SpO2 96%   BMI 21.43 kg/m²       CXR Findings: 6/10/2021      FINDINGS:   Heart size is normal.  There has been sternotomy.  Tubes are appropriate and unchanged.  There is some left lower lobe infiltrate and small effusion. There is no pneumothorax. ---  CXR personally viewed and interpreted by ICU Nurse Practitioner, agree with above findings     General: awake on ventilator   Eyes: PERRL, anicteric   Pulmonary: Diminished bibasilar.  No wheezes, no accessory muscle use noted   Cardiovascular:  RRR, no heaves or thrills on palpation  Tele: SR  Abdomen: Soft, OG to LIWS    Extremities: Palpable pulses all extremities, no edema   Neurologic/Psych: awake following commands on ventilator   Skin: Warm and dry  Incisions: MSI with stephani dressing intact       Assessment/Plan: POD #1  1. MVCAD S/p CABGx4   - ASA (hold off plavix today), Lipitor   - empiric zosyn after emergent chest exploration   - Monitor chest tube output and hemodynamics closely     2. Acute Pulmonary Insufficiency Following Surgery    - Extubated DOS and then subsequently reintubated in setting of acute hemorrhage  -On PSV this morning, plan to extubate soon     3. Postoperative Hypotension   -Remains on 4mcq/min  Norepi, wean off as able, target maps >65. 50g albumin ordered. IVF prn      4. Acute blood loss anemia  -postop course c/b bleeding, complete white out of left chest on CXR---transfused 2prbc, 2ffp, plts and returned to OR for control of bleeding   -H/H stable, CT output stable        VTE Prophylaxis: Pharmacologic/Mechanical:  Yes, SCDs   Line infection prevention: Can CVC or arterial line be removed: No  Continued need for urinary catheter: Yes - clinical indication: Patient post major surgery requiring fluid balance and input and output measurement. This patient has a high probability of sudden deterioration, which requires preparedness to urgently intervene. I participated in the decision making process and managed the direct care of the patient that required frequent assessment and treatment. I personally spent 38 minutes of critical care time treating the patient.          Dispo: CVICU     Electronically signed by NOMAN Thornton CNP on 6/10/2021 at 9:48 AM

## 2021-06-10 NOTE — PROGRESS NOTES
Dr. Jasbir Merino updated regarding patients increased CT drainage as well as current condition. Physician said he will be here shortly to take patient back to OR.

## 2021-06-10 NOTE — ANESTHESIA PRE PROCEDURE
Department of Anesthesiology  Preprocedure Note       Name:  Praveena Batch   Age:  78 y.o.  :  1950                                          MRN:  [id number]         Date:  2021      Surgeon: Patsy Ta):  So Snyder DO    Procedure: Procedure(s): HEART POST OP HEMORRHAGE CONTROL    Medications prior to admission:   Prior to Admission medications    Medication Sig Start Date End Date Taking?  Authorizing Provider   atorvastatin (LIPITOR) 20 MG tablet Take 80 mg by mouth daily    Yes Historical Provider, MD   aspirin 81 MG chewable tablet Take 81 mg by mouth daily   Yes Historical Provider, MD   metoprolol tartrate (LOPRESSOR) 25 MG tablet Take 25 mg by mouth 2 times daily   Yes Historical Provider, MD   famotidine (PEPCID) 20 MG tablet Take 20 mg by mouth 2 times daily   Yes Historical Provider, MD   nitroglycerin (NITRO-BID) 2 % ointment Place 0.5 inches onto the skin every 6 hours   Yes Historical Provider, MD   ibuprofen (ADVIL;MOTRIN) 200 MG tablet Take 200 mg by mouth every 6 hours as needed for Pain (hips/back)   Yes Historical Provider, MD       Current medications:    Current Facility-Administered Medications   Medication Dose Route Frequency Provider Last Rate Last Admin    0.9 % sodium chloride infusion  30 mL/hr Intravenous Continuous So Candy, DO 30 mL/hr at 21 1236 30 mL/hr at 21 1236    sodium chloride flush 0.9 % injection 10 mL  10 mL Intravenous 2 times per day So Candy, DO        sodium chloride flush 0.9 % injection 10 mL  10 mL Intravenous PRN So Candy, DO   10 mL at 21 1801    0.9 % sodium chloride infusion  25 mL Intravenous PRN So Candy, DO        ondansetron [facility]) injection 4 mg  4 mg Intravenous Q8H PRN So Candy, DO        [START ON 6/10/2021] aspirin EC tablet 81 mg  81 mg Oral Daily Orestes Abad DO        acetaminophen (TYLENOL) tablet 650 mg  650 mg Oral Q4H PRN So Candy, DO        acetaminophen (TYLENOL) suppository 650 mg  650 mg Rectal Q4H PRN Lorean Carli, DO        oxyCODONE (ROXICODONE) immediate release tablet 5 mg  5 mg Oral Q4H PRN Lorean Carli, DO        Or    oxyCODONE HCl (OXY-IR) immediate release tablet 10 mg  10 mg Oral Q4H PRN Lorean Carli, DO        fentaNYL (SUBLIMAZE) injection 25 mcg  25 mcg Intravenous Q1H PRN Lorean Carli, DO   25 mcg at 06/09/21 1722    Or    fentaNYL (SUBLIMAZE) injection 50 mcg  50 mcg Intravenous Q1H PRN Lorean Carli, DO        chlorhexidine (PERIDEX) 0.12 % solution 15 mL  15 mL Mouth/Throat BID Lorean Carli, DO   15 mL at 06/09/21 1300    [START ON 6/10/2021] magnesium oxide (MAG-OX) tablet 400 mg  400 mg Oral Daily Lorean Carli, DO        mupirocin (BACTROBAN) 2 % ointment   Nasal BID Lorean Carli, DO   Given at 06/09/21 1434    propofol injection  10 mcg/kg/min Intravenous Continuous PRN Lorean Carli, DO   Stopped at 06/09/21 1613    sennosides-docusate sodium (SENOKOT-S) 8.6-50 MG tablet 1 tablet  1 tablet Oral BID Lorean Carli, DO        magnesium hydroxide (MILK OF MAGNESIA) 400 MG/5ML suspension 30 mL  30 mL Oral Daily PRN Lorean Carli, DO        [START ON 6/10/2021] pantoprazole (PROTONIX) tablet 40 mg  40 mg Oral Daily Lorean Carli, DO        ceFAZolin (ANCEF) 2000 mg in sterile water 20 mL IV syringe  2,000 mg Intravenous Q8H Lorean Carli, DO   2,000 mg at 06/09/21 1759    potassium chloride 20 mEq/50 mL IVPB (Central Line)  20 mEq Intravenous PRN Lorean Carli, DO   Stopped at 06/09/21 1442    magnesium sulfate 2000 mg in 50 mL IVPB premix  2,000 mg Intravenous PRN Lorean Carli, DO        ipratropium-albuterol (DUONEB) nebulizer solution 1 ampule  1 ampule Inhalation Q4H WA Lorean Acrli, DO   1 ampule at 06/09/21 1731    albumin human 5 % IV solution 25 g  25 g Intravenous PRN Lorean Carli,  mL/hr at 06/09/21 1914 25 g at 06/09/21 1914    0.9 % sodium chloride bolus  250 mL Intravenous Continuous PRN Ra Boyce,   norepinephrine (LEVOPHED) 16 mg in dextrose 5 % 250 mL infusion  2 mcg/min Intravenous Continuous PRN Lei Eileen, DO 9.4 mL/hr at 06/09/21 2007 10 mcg/min at 06/09/21 2007    clevidipine (CLEVIPREX) infusion  2 mg/hr Intravenous Continuous PRN Lei Bonds, DO        insulin regular (HUMULIN R;NOVOLIN R) 100 Units in sodium chloride 0.9 % 100 mL infusion  1 Units/hr Intravenous Continuous Lei Eileen, DO   Held at 06/09/21 1815    glucose (GLUTOSE) 40 % oral gel 15 g  15 g Oral PRN Lei Bonds, DO        dextrose 50 % IV solution  12.5 g Intravenous PRN Lei Bonds, DO        glucagon (rDNA) injection 1 mg  1 mg Intramuscular PRN Branden Abad, DO        dextrose 5 % solution  100 mL/hr Intravenous PRN Lei Bonds, DO        calcium gluconate 1,000 mg in dextrose 5 % 100 mL IVPB  1,000 mg Intravenous PRN Kb Arellano, DO        [START ON 6/10/2021] clopidogrel (PLAVIX) tablet 75 mg  75 mg Oral Daily Orestes Abad, DO        insulin lispro (HUMALOG) injection vial 0-18 Units  0-18 Units Subcutaneous Q4H Branden Abad, DO        famotidine (PEPCID) tablet 20 mg  20 mg Oral Daily Nataly Whittington, DO        albumin human 25 % IV solution 25 g  25 g Intravenous [facility] [employer], APRN - CNP        0.9 % sodium chloride infusion   Intravenous PRN [facility], APRN - CNP        0.9 % sodium chloride infusion   Intravenous PRN [facility], APRN - CNP        0.9 % sodium chloride infusion   Intravenous PRN [facility], APRN - CNP        0.9 % sodium chloride infusion   Intravenous PRN [facility], APRN - CNP        mupirocin [facility]) 2 % ointment   Nasal BID Kb Arellano, DO   Given at 06/08/21 2113    atorvastatin (LIPITOR) tablet 80 mg  80 mg Oral Nightly Kb Arellano, DO   80 mg at 06/08/21 2021       Allergies:     Allergies   Allergen Reactions    Tramadol Nausea Only       Problem List:    Patient Active Problem List   Diagnosis Code    CAD in native artery I25.10    Postprocedural hypotension I95.81    Acute pulmonary insufficiency J98.4       Past Medical History:        Diagnosis Date    CAD in native artery 6/7/2021    COPD (chronic obstructive pulmonary disease) (HCC)     Hyperlipidemia     Hypertension        Past Surgical History:  No past surgical history on file. Social History:    Social History     Tobacco Use    Smoking status: Former Smoker    Smokeless tobacco: Never Used   Substance Use Topics    Alcohol use: Not on file                                Counseling given: Not Answered      Vital Signs (Current):   Vitals:    06/09/21 2000 06/09/21 2002 06/09/21 2021 06/09/21 2022   BP:  (!) 127/49  (!) 130/52   Pulse: 111 113 108 105   Resp: 21 21 28 23   Temp:  99 °F (37.2 °C)  99 °F (37.2 °C)   TempSrc:       SpO2: 96%  94%    Weight:       Height:                                                  BP Readings from Last 3 Encounters:   06/09/21 (!) 130/52       NPO Status:  > 8hrs                                                                                   CT Chest WO Contrast 6/8/2021  FINDINGS:   There is borderline cardiac size with tiny pericardial effusion.  There is   coronary artery calcification.  There is minimal calcification of the aortic   root.  The ascending thoracic aorta is otherwise normal measuring 2.9 x 2.8   cm without calcification.  There is calcification of the aortic arch and   descending thoracic aorta.  There is advanced centrilobular emphysema. Raymona Kwaku   is of normal architecture.  Sludge and stones are suspected in the   gallbladder.  Degenerative changes are identified in the thoracic spine.           Impression   Minimal punctate calcification of the aortic root.  Otherwise the ascending   thoracic aorta is normal without significant calcification.       Advanced centrilobular emphysema and coronary artery calcification.               US Carotid Artery 6/8/2021      Impression   Atherosclerotic disease.  No hemodynamically significant stenosis is   identified   Estimated stenosis by NASCET criteria in the proximal right carotid   artery is between 0% and 49%. Estimated stenosis by NASCET criteria in the proximal left carotid   artery is between 0% and 49%.               Diagnostic Cardiac Cath 6/7/2021  Angiographic Results/findings:   Left Main: Distal diffuse 50%.  Moderately calcified. LAD: Severely tortuous.  Ostial 75 to 80%, mid 50%   Cx: Severely tortuous.  Proximal to mid diffuse 80%, distal   diffuse 75 to 80%   OM1: Ostial 90%   OM2: Very small vessel. Ramus: Bifurcating vessel.  Ostial to proximal diffuse 80%.     Proximal mid diffuse 40%.  There is an anterior branch ostial 70%   and proximal diffuse 75%.  The lateral branch bifurcation has an   ostial 95% stenosis. RCA: Dominant.  Heavily calcified.  Tortuous.  Mid diffuse 85 to   90%, distal bifurcation subtotal chronic occlusion   PDA: Ostial subtotal occlusion as part of the bifurcation   subtotal occlusion. PLB: Ostial 95% as part of the bifurcation lesion. LV: Proximal inferior hypokinesis.  Ejection fraction 55%     BMI:   Wt Readings from Last 3 Encounters:   06/09/21 110 lb 0.2 oz (49.9 kg)     Body mass index is 20.45 kg/m².     CBC:   Lab Results   Component Value Date    WBC 11.5 06/09/2021    RBC 2.48 06/09/2021    HGB 7.0 06/09/2021    HCT 22.2 06/09/2021    MCV 89.5 06/09/2021    RDW 14.5 06/09/2021    PLT 87 06/09/2021       CMP:   Lab Results   Component Value Date     06/09/2021    K 4.05 06/09/2021    K 4.1 06/08/2021     06/09/2021    CO2 23 06/09/2021    BUN 12 06/09/2021    CREATININE 0.8 06/09/2021    GFRAA >60 06/09/2021    LABGLOM >60 06/09/2021    GLUCOSE 129 06/09/2021    PROT 7.4 06/08/2021    CALCIUM 8.6 06/09/2021    BILITOT 0.4 06/08/2021    ALKPHOS 130 06/08/2021    AST 82 06/08/2021    ALT 73 06/08/2021       POC Tests: No results for input(s): POCGLU, POCNA, POCK, POCCL, POCBUN, POCHEMO, POCHCT in the last 72 hours. Coags:   Lab Results   Component Value Date    PROTIME 15.6 06/09/2021    INR 1.4 06/09/2021    APTT 33.9 06/09/2021       HCG (If Applicable): No results found for: PREGTESTUR, PREGSERUM, HCG, HCGQUANT     ABGs:   Lab Results   Component Value Date    PO2ART 55.0 06/09/2021    XKF9GDW 46.6 06/09/2021    HRI7EGK 22.3 06/09/2021        Type & Screen (If Applicable):  No results found for: LABABO, LABRH    Drug/Infectious Status (If Applicable):  No results found for: HIV, HEPCAB    COVID-19 Screening (If Applicable): No results found for: COVID19        Anesthesia Evaluation  Patient summary reviewed no history of anesthetic complications:   Airway: Mallampati: II  TM distance: <3 FB   Neck ROM: full  Mouth opening: > = 3 FB Dental:      Comment: Patient denies loose. Pulmonary: breath sounds clear to auscultation  (+) COPD: mild,  shortness of breath (SOB with exertion):                            ROS comment: Former smoker - 1 pack a day for 30 yrs. Quit 19 yrs ago. Cardiovascular:    (+) hypertension:, angina: with exertion, CAD: obstructive, CABG/stent:, hyperlipidemia        Rhythm: regular  Rate: normal           Beta Blocker:  Dose within 24 Hrs      ROS comment: Post op bleeding     Neuro/Psych:   Negative Neuro/Psych ROS              GI/Hepatic/Renal: Neg GI/Hepatic/Renal ROS            Endo/Other: Negative Endo/Other ROS                    Abdominal:           Vascular: negative vascular ROS. Anesthesia Plan      general     ASA 4 - emergent       Induction: intravenous. arterial line, BIS, central line, CVP and AVELINA  MIPS: Postoperative opioids intended, Prophylactic antiemetics administered and Postoperative ventilation. Anesthetic plan and risks discussed with patient. Use of blood products discussed with patient whom consented to blood products. Plan discussed with attending and CRNA.                   Lord Yovanny DO 6/9/2021

## 2021-06-10 NOTE — PROGRESS NOTES
Hospitalist Progress Note      SYNOPSIS: Patient admitted on 2021 for unstable angina. Cardiac cath on  revealed multivessel CAD needing surgical revascularization. SUBJECTIVE:    Patient seen and examined  Records reviewed. Seen in CVICU  OR revisit last night reviewed   Vladimir Reynolds at bedside    Stable overnight. No other overnight issues reported. Temp (24hrs), Av.8 °F (37.1 °C), Min:97.9 °F (36.6 °C), Max:99.5 °F (37.5 °C)    DIET: No diet orders on file  CODE: Full Code    Intake/Output Summary (Last 24 hours) at 6/10/2021 1449  Last data filed at 6/10/2021 1400  Gross per 24 hour   Intake 3731.37 ml   Output 4180 ml   Net -448.63 ml       OBJECTIVE:    BP (!) 133/59   Pulse 100   Temp 99.3 °F (37.4 °C) (Temporal)   Resp 30   Ht 5' 1.5\" (1.562 m)   Wt 115 lb 4.8 oz (52.3 kg)   SpO2 92%   BMI 21.43 kg/m²     General appearance: Intubated, on wean trial at present  HEENT:  Conjunctivae/corneas clear. Neck: Supple. No jugular venous distention. Respiratory: Clear to auscultation bilaterally, normal respiratory effort - on PS  Cardiovascular: Regular rate rhythm, normal S1-S2  Abdomen: Soft, nontender, nondistended  Musculoskeletal: No clubbing, cyanosis, no bilateral lower extremity edema. Brisk capillary refill. Skin:  No rashes  on visible skin  Neurologic: awake, alert and following commands     ASSESSMENT:    Exertional chest pain  Multivessel CAD  Hypertension, hyperlipidemia  Extobacco smoker  Prediabetes and A1c of 6.0  Hyperlipidemia    Thrombocytopena     PLAN:    Post op complication. Urgent OR last night  Currently on PS being weaned; extubation planned today.   PTOT    DISPOSITION: Pending further course    Medications:  REVIEWED DAILY    Infusion Medications    norepinephrine 4 mcg/min (06/10/21 1243)    sodium chloride 30 mL/hr (06/10/21 0043)    propofol Stopped (06/10/21 0748)    clevidipine      dextrose       Scheduled Medications    sodium chloride flush  10 mL Intravenous 2 times per day    aspirin  81 mg Oral Daily    chlorhexidine  15 mL Mouth/Throat BID    magnesium oxide  400 mg Oral Daily    mupirocin   Nasal BID    sennosides-docusate sodium  1 tablet Oral BID    pantoprazole  40 mg Oral Daily    ipratropium-albuterol  1 ampule Inhalation Q4H WA    clopidogrel  75 mg Oral Daily    insulin lispro  0-18 Units Subcutaneous Q4H    famotidine  20 mg Oral Daily    albumin human  25 g Intravenous Once    piperacillin-tazobactam  3,375 mg Intravenous Q8H    And    sodium chloride   Intravenous Q8H    atorvastatin  80 mg Oral Nightly     PRN Meds: norepinephrine, sodium chloride flush, ondansetron, acetaminophen, acetaminophen, oxyCODONE **OR** oxyCODONE HCl, fentanNYL **OR** fentanNYL, propofol, magnesium hydroxide, potassium chloride, magnesium sulfate, albumin human, clevidipine, glucose, dextrose, glucagon (rDNA), dextrose, calcium gluconate IVPB    Labs:     Recent Labs     06/09/21  1915 06/09/21  2240 06/10/21  0410   WBC 11.5 8.5 9.4   HGB 7.0* 9.3* 9.9*   HCT 22.2* 29.0* 30.4*   PLT 87* 96* 111*       Recent Labs     06/09/21  1220 06/09/21  2240 06/10/21  0410 06/10/21  0858 06/10/21  0948    146 146  --   --    K 3.8 3.9 4.2 3.98 3.91    108* 111*  --   --    CO2 23 24 25  --   --    BUN 12 12 12  --   --    CREATININE 0.8 0.9 0.9  --   --    CALCIUM 8.6 7.8* 8.3*  --   --        Recent Labs     06/08/21  1005   PROT 7.4   ALKPHOS 130*   ALT 73*   AST 82*   BILITOT 0.4       Recent Labs     06/08/21  1005 06/09/21  1220   INR 1.0 1.4       No results for input(s): CKTOTAL, TROPONINI in the last 72 hours.     Chronic labs:    Lab Results   Component Value Date    INR 1.4 06/09/2021    LABA1C 6.0 (H) 06/08/2021       Radiology: REVIEWED DAILY    +++++++++++++++++++++++++++++++++++++++++++++++++  Ben Thomas MD  [employer] - [address], [place]  +++++++++++++++++++++++++++++++++++++++++++++++++  NOTE: This report was transcribed using voice recognition software. Every effort was made to ensure accuracy; however, inadvertent computerized transcription errors may be present.

## 2021-06-10 NOTE — PLAN OF CARE
Problem: Falls - Risk of:  Goal: Will remain free from falls  Description: Will remain free from falls  6/10/2021 0044 by Cesia Higgins RN  Outcome: Met This Shift     Problem: Falls - Risk of:  Goal: Absence of physical injury  Description: Absence of physical injury  6/10/2021 0044 by Cesia Higgins RN  Outcome: Met This Shift     Problem: Cardiac Output - Decreased:  Goal: Cardiac output within specified parameters  Description: Cardiac output within specified parameters  6/10/2021 0044 by Cesia Higgins RN  Outcome: Met This Shift     Problem: Cardiac Output - Decreased:  Goal: Hemodynamic stability will improve  Description: Hemodynamic stability will improve  6/10/2021 0044 by Cesia Higgins RN  Outcome: Met This Shift     Problem: Fluid Volume - Imbalance:  Goal: Ability to achieve a balanced intake and output will improve  Description: Ability to achieve a balanced intake and output will improve  Outcome: Met This Shift     Problem: Fluid Volume - Imbalance:  Goal: Chest tube drainage is within specified parameters  Description: Chest tube drainage is within specified parameters  Outcome: Met This Shift

## 2021-06-10 NOTE — FLOWSHEET NOTE
NP updated on increase O2 demands, low urinary output and respirations 30-40, and Levo requirements. Stat chest xray ordered.   1648 placed on Bipap

## 2021-06-10 NOTE — PROGRESS NOTES
Patient was extubated to 6 liters/min via nasal cannula. Breath Sounds post extubation were clear throughout all lung fields anterior and posteriorly. Stridor was not present post extubation. SPO2 was 92%. Patient suctioned orally and down the endotube prior to extubation. Patient is able to voice name post extubation.       Performed by  Ke Esqueda RCP

## 2021-06-10 NOTE — PROGRESS NOTES
Discussed patients condition and increased CT output as well as increased need for vasopressors with NP. Advised to order STAT CXR and 2 units of PRBC/ 1 unit of platelets.

## 2021-06-10 NOTE — PROGRESS NOTES
Physical Therapy  Physical Therapy Attempt    Name: Azeb Perea  : 1950  MRN: [id number]      Date of Service: 6/10/2021  Chart reviewed. Spoke with NP - will hold as pt returned to the OR last night and possible extubation today. Will re-attempt as able.     Joselin Dozier, PT, DPT  [id number]

## 2021-06-10 NOTE — ANESTHESIA POSTPROCEDURE EVALUATION
Department of Anesthesiology  Postprocedure Note    Patient: Emelina Hernandez  MRN: [id number]  YOB: 1950  Date of evaluation: 6/10/2021  Time:  6:30 AM     Procedure Summary     Date: 06/09/21 Room / Location: [facility] 01 / [facility]    Anesthesia Start: 2103 Anesthesia Stop: 2239    Procedure: HEART POST OP HEMORRHAGE CONTROL (N/A ) Diagnosis: (POST OP HEMORRHAGE)    Surgeons: Sheridan Leonard DO Responsible Provider: Remy Terry DO    Anesthesia Type: general ASA Status: 4 - Emergent          Anesthesia Type: general    Frank Phase I: Frank Score: 7    Frank Phase II:      Last vitals: Reviewed and per EMR flowsheets.        Anesthesia Post Evaluation    Patient location during evaluation: ICU  Patient participation: complete - patient cannot participate  Level of consciousness: sedated and ventilated  Airway patency: patent  Nausea & Vomiting: no nausea and no vomiting  Complications: no  Cardiovascular status: blood pressure returned to baseline  Respiratory status: acceptable  Hydration status: euvolemic

## 2021-06-10 NOTE — PROGRESS NOTES
[facility] [address]    Name: Agustín Aparicio    Age: 78 y.o. PCP: No primary care provider on file. Date of Admission: 6/7/2021  1:23 PM    Date of Service: 6/10/2021    Chief Complaint: Follow-up for CAD  CABG 6/9/2021: LIMA>LAD,VG>ramus>OM, VG>PDA    Interim History:  . Sent by Dr Ariel Arenas for cath from [place]. 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 No issues from cath. Issues with hypotension- received albumin and on levophed. Dropped Hg yesterday afternoon and went back to OR for bleeding. Just extubated. Thirsty and sore, denies dyspnea    Telemetry personally reviewed and showed sinus tachy    Review of Systems: On vent    Problem List:  Active Problems:    CAD in native artery    Postprocedural hypotension    Acute pulmonary insufficiency  Resolved Problems:    * No resolved hospital problems. *      Past Medical History:  Past Medical History:   Diagnosis Date    CAD in native artery 6/7/2021    COPD (chronic obstructive pulmonary disease) (HCC)     Hyperlipidemia     Hypertension        Allergies:   Allergies   Allergen Reactions    Tramadol Nausea Only       Current Medications:  Current Facility-Administered Medications   Medication Dose Route Frequency Provider Last Rate Last Admin    norepinephrine (LEVOPHED) 16 mg in dextrose 5% 250 mL infusion  2 mcg/min Intravenous Continuous PRN ELLIS Richardson Junior        0.9 % sodium chloride infusion  30 mL/hr Intravenous Continuous Sanjuana Kylie, DO 30 mL/hr at 06/10/21 0043 30 mL/hr at 06/10/21 0043    sodium chloride flush 0.9 % injection 10 mL  10 mL Intravenous 2 times per day Sanjuana Kylie, DO   10 mL at 06/10/21 0757    sodium chloride flush 0.9 % injection 10 mL  10 mL Intravenous PRN Sanjuana Kylie, DO   10 mL at 06/09/21 1801    0.9 % sodium chloride infusion  25 mL Intravenous PRN Sanjuana Kylie, DO        ondansetron [facility]) injection 4 mg  4 mg Intravenous Q8H PRN  norepinephrine      sodium chloride 30 mL/hr (06/10/21 0043)    sodium chloride      propofol Stopped (06/10/21 0748)    sodium chloride      clevidipine      insulin Stopped (06/09/21 1815)    dextrose      sodium chloride      sodium chloride      sodium chloride      sodium chloride         Physical Exam:  BP (!) 133/59   Pulse 119   Temp 99.5 °F (37.5 °C) (Temporal)   Resp (!) 32   Ht 5' 1.5\" (1.562 m)   Wt 115 lb 4.8 oz (52.3 kg)   SpO2 (!) 87%   BMI 21.43 kg/m²   Weight change: 10 lb 13 oz (4.903 kg)  Wt Readings from Last 3 Encounters:   06/10/21 115 lb 4.8 oz (52.3 kg)     General: s/p CABG on vent and pressor sedated  Neck: No JVD, thyromegaly,  CV: RRR  Resp: clear unlabored respirations on vent  Abdomen:  nondistended,   Extremities: no cyanosis, clubbing, or edema.    Skin: Warm and dry, no rashes or lesions  Neuro:sedated    Intake/Output:    Intake/Output Summary (Last 24 hours) at 6/10/2021 1102  Last data filed at 6/10/2021 1000  Gross per 24 hour   Intake 7676.57 ml   Output 5450 ml   Net 2226.57 ml     I/O this shift:  In: -   Out: 230 [Urine:100; Chest Tube:130]    Laboratory Tests:  Last 3 CBC:  Recent Labs     06/09/21  1915 06/09/21 2240 06/10/21  0410   WBC 11.5 8.5 9.4   RBC 2.48* 3.18* 3.42*   HGB 7.0* 9.3* 9.9*   HCT 22.2* 29.0* 30.4*   MCV 89.5 91.2 88.9   MCH 28.2 29.2 28.9   MCHC 31.5* 32.1 32.6   RDW 14.5 14.3 14.3   PLT 87* 96* 111*   MPV 10.4 10.6 10.7       Last 3 CMP:    Recent Labs     06/08/21  1005 06/09/21  1220 06/09/21  2240 06/10/21  0410 06/10/21  0858 06/10/21  0948    138 146 146  --   --    K 4.2 3.8 3.9 4.2 3.98 3.91    107 108* 111*  --   --    CO2 20* 23 24 25  --   --    BUN 14 12 12 12  --   --    CREATININE 0.8 0.8 0.9 0.9  --   --    GLUCOSE 112* 129* 220* 158*  --   --    CALCIUM 9.5 8.6 7.8* 8.3*  --   --    PROT 7.4  --   --   --   --   --    LABALBU 4.2  --   --   --   --   --    BILITOT 0.4  --   --   --   --   --    [place] 130*  --   --   --   --   --    AST 82*  --   --   --   --   --    ALT 73*  --   --   --   --   --        Last 3 Mag/Phos:  Recent Labs     06/09/21  1220 06/10/21  0410   MG 2.4 1.8       Last 3 CK, CKMB, Troponin  No results for input(s): CKTOTAL, CKMB, TROPONINI in the last 72 hours. Last 3 BNP:  No results for input(s): BNP in the last 72 hours. No results found for: BNP    Last 3 Glucose:     Recent Labs     06/09/21  1220 06/09/21  2240 06/10/21  0410   GLUCOSE 129* 220* 158*       Last 3 Coags:  Recent Labs     06/08/21  1005 06/09/21  1220   PROTIME 11.0 15.6*   INR 1.0 1.4     Lab Results   Component Value Date    PROTIME 15.6 06/09/2021    INR 1.4 06/09/2021       Last 3 Lipid Panel:  No results for input(s): LDLCALC, HDL, TRIG in the last 72 hours. Invalid input(s): CHLPL  No results found for: LDLCALC  No results found for: HDL  No results found for: TRIG  No components found for: CHLPL    TSH:  No results for input(s): TSH in the last 72 hours. No results found for: TSH        Radiology:  XR CHEST PORTABLE   Final Result   No interval change         XR CHEST PORTABLE   Final Result   Status post median sternotomy with support tubes and lines in place as   described. Significant decrease in previously noted left pleural effusion. Vascular congestion with interstitial pulmonary edema. XR CHEST PORTABLE   Final Result   Smoothly marginated opacity within the left upper lung zone, contiguous with   lateral pleural fluid and concerning for fluid opacification of the left   apical pleura. Element of parenchymal involvement not excluded. History is nonspecific, with left apical hemorrhage possible if attempted   recent instrumentation, with an angulated linear radiopacity seen at the left   apex on prior study. Please correlate clinically. XR CHEST PORTABLE   Final Result   Bilateral opacities likely represents postoperative atelectasis.       Lines and tubes are

## 2021-06-10 NOTE — BRIEF OP NOTE
Brief Postoperative Note      Patient: Agustín Aparicio  YOB: 1950  MRN: [id number]    Date of Procedure: 6/9/2021    Pre-Op Diagnosis: POST OP HEMORRHAGE    Post-Op Diagnosis: Same       Procedure(s):  Exploration of mediastinum   [place]  Control of hemorrhage (small branch of left subclavian vein)    Surgeon(s):  Sanjuana Espinal DO    Assistant:  Physician Assistant: Sunita Frank PA-C    Anesthesia: General    Estimated Blood Loss (mL): 8887    Complications: None    Specimens:   * No specimens in log *    Implants:  * No implants in log *      Drains:   Chest Tube 1 Mediastinal 19 Ukrainian (Active)   $ Chest tube insertion $ Yes 06/09/21 1215   Suction -20 cm H2O 06/09/21 2100   Chest Tube Airleak No 06/09/21 2100   Drainage Description Dark red 06/09/21 2100   Dressing Status Clean;Dry; Intact 06/09/21 2207   Dressing Type Dry dressing 06/09/21 2207   Site Assessment Not assessed 06/09/21 2100   Surrounding Skin Unable to view 06/09/21 2100   Patency Intervention Tip/Tilt 06/09/21 2000   Output (ml) 200 ml 06/09/21 2100       Chest Tube 2 Mediastinal 19 Ukrainian (Active)   $ Chest tube insertion $ Yes 06/09/21 1215   Dressing Status Clean;Dry; Intact 06/09/21 2208   Dressing Type Dry dressing 06/09/21 2208       Chest Tube 3 Pleural 19 Ukrainian (Active)   $ Chest tube insertion $ Yes 06/09/21 1215   Suction -20 cm H2O 06/09/21 2100   Chest Tube Airleak No 06/09/21 2100   Drainage Description Dark red 06/09/21 2100   Dressing Status Clean;Dry; Intact 06/09/21 2208   Dressing Type Dry dressing 06/09/21 2208   Site Assessment Not assessed 06/09/21 2100   Surrounding Skin Unable to view 06/09/21 2100   Patency Intervention Tip/Tilt 06/09/21 1900   Output (ml) 90 ml 06/09/21 2100       Urethral Catheter Temperature probe 16 fr (Active)   $ Urethral catheter insertion $ Not inserted for procedure 06/09/21 1215   Catheter Indications Need for fluid volume management of the critically ill patient in a critical care setting 06/09/21 2100   Urine Color Diluted 06/09/21 2100   Urine Appearance Clear 06/09/21 2100   Output (mL) 60 mL 06/09/21 2100       Electronically signed by Navdeep Vanegas DO on 6/9/2021 at 10:15 PM

## 2021-06-11 ENCOUNTER — APPOINTMENT (OUTPATIENT)
Dept: GENERAL RADIOLOGY | Age: 71
DRG: 233 | End: 2021-06-11
Attending: FAMILY MEDICINE
Payer: MEDICARE

## 2021-06-11 LAB
AADO2: 279.8 MMHG
ANION GAP SERPL CALCULATED.3IONS-SCNC: 13 MMOL/L (ref 7–16)
B.E.: -2.3 MMOL/L (ref -3–3)
BUN BLDV-MCNC: 17 MG/DL (ref 6–23)
CALCIUM SERPL-MCNC: 8.6 MG/DL (ref 8.6–10.2)
CHLORIDE BLD-SCNC: 105 MMOL/L (ref 98–107)
CO2: 22 MMOL/L (ref 22–29)
COHB: 0.3 % (ref 0–1.5)
CREAT SERPL-MCNC: 0.9 MG/DL (ref 0.5–1)
CRITICAL: ABNORMAL
DATE ANALYZED: ABNORMAL
DATE OF COLLECTION: ABNORMAL
FIO2: 60 %
GFR AFRICAN AMERICAN: >60
GFR NON-AFRICAN AMERICAN: >60 ML/MIN/1.73
GLUCOSE BLD-MCNC: 143 MG/DL (ref 74–99)
HCO3: 21.8 MMOL/L (ref 22–26)
HCT VFR BLD CALC: 28.1 % (ref 34–48)
HEMOGLOBIN: 9.1 G/DL (ref 11.5–15.5)
HHB: 3.6 % (ref 0–5)
LAB: ABNORMAL
Lab: ABNORMAL
MAGNESIUM: 2.2 MG/DL (ref 1.6–2.6)
MCH RBC QN AUTO: 29.4 PG (ref 26–35)
MCHC RBC AUTO-ENTMCNC: 32.4 % (ref 32–34.5)
MCV RBC AUTO: 90.9 FL (ref 80–99.9)
METER GLUCOSE: 132 MG/DL (ref 74–99)
METER GLUCOSE: 133 MG/DL (ref 74–99)
METER GLUCOSE: 134 MG/DL (ref 74–99)
METER GLUCOSE: 137 MG/DL (ref 74–99)
METER GLUCOSE: 144 MG/DL (ref 74–99)
METER GLUCOSE: 152 MG/DL (ref 74–99)
METHB: 0.1 % (ref 0–1.5)
MODE: ABNORMAL
O2 SATURATION: 96.4 % (ref 92–98.5)
O2HB: 96 % (ref 94–97)
OPERATOR ID: 1632
PATIENT TEMP: 37 C
PCO2: 34.7 MMHG (ref 35–45)
PDW BLD-RTO: 14.8 FL (ref 11.5–15)
PEEP/CPAP: 7 CMH2O
PFO2: 1.58 MMHG/%
PH BLOOD GAS: 7.42 (ref 7.35–7.45)
PIP: 10 CMH2O
PLATELET # BLD: 90 E9/L (ref 130–450)
PLATELET CONFIRMATION: NORMAL
PMV BLD AUTO: 12.2 FL (ref 7–12)
PO2: 94.8 MMHG (ref 75–100)
POTASSIUM SERPL-SCNC: 4.1 MMOL/L (ref 3.5–5)
RBC # BLD: 3.09 E12/L (ref 3.5–5.5)
RI(T): 2.95
SODIUM BLD-SCNC: 140 MMOL/L (ref 132–146)
SOURCE, BLOOD GAS: ABNORMAL
THB: 9.8 G/DL (ref 11.5–16.5)
TIME ANALYZED: 430
WBC # BLD: 9.6 E9/L (ref 4.5–11.5)

## 2021-06-11 PROCEDURE — 2700000000 HC OXYGEN THERAPY PER DAY

## 2021-06-11 PROCEDURE — 6360000002 HC RX W HCPCS: Performed by: NURSE PRACTITIONER

## 2021-06-11 PROCEDURE — 94660 CPAP INITIATION&MGMT: CPT

## 2021-06-11 PROCEDURE — 2580000003 HC RX 258: Performed by: NURSE PRACTITIONER

## 2021-06-11 PROCEDURE — 6370000000 HC RX 637 (ALT 250 FOR IP): Performed by: THORACIC SURGERY (CARDIOTHORACIC VASCULAR SURGERY)

## 2021-06-11 PROCEDURE — 36415 COLL VENOUS BLD VENIPUNCTURE: CPT

## 2021-06-11 PROCEDURE — 83735 ASSAY OF MAGNESIUM: CPT

## 2021-06-11 PROCEDURE — 97166 OT EVAL MOD COMPLEX 45 MIN: CPT

## 2021-06-11 PROCEDURE — 85027 COMPLETE CBC AUTOMATED: CPT

## 2021-06-11 PROCEDURE — 6370000000 HC RX 637 (ALT 250 FOR IP): Performed by: INTERNAL MEDICINE

## 2021-06-11 PROCEDURE — 97530 THERAPEUTIC ACTIVITIES: CPT

## 2021-06-11 PROCEDURE — 2000000000 HC ICU R&B

## 2021-06-11 PROCEDURE — 94640 AIRWAY INHALATION TREATMENT: CPT

## 2021-06-11 PROCEDURE — 37799 UNLISTED PX VASCULAR SURGERY: CPT

## 2021-06-11 PROCEDURE — 82805 BLOOD GASES W/O2 SATURATION: CPT

## 2021-06-11 PROCEDURE — 2580000003 HC RX 258: Performed by: PHYSICIAN ASSISTANT

## 2021-06-11 PROCEDURE — 2580000003 HC RX 258: Performed by: THORACIC SURGERY (CARDIOTHORACIC VASCULAR SURGERY)

## 2021-06-11 PROCEDURE — 99233 SBSQ HOSP IP/OBS HIGH 50: CPT | Performed by: NURSE PRACTITIONER

## 2021-06-11 PROCEDURE — 71045 X-RAY EXAM CHEST 1 VIEW: CPT

## 2021-06-11 PROCEDURE — 6360000002 HC RX W HCPCS: Performed by: THORACIC SURGERY (CARDIOTHORACIC VASCULAR SURGERY)

## 2021-06-11 PROCEDURE — 97162 PT EVAL MOD COMPLEX 30 MIN: CPT

## 2021-06-11 PROCEDURE — 6360000002 HC RX W HCPCS: Performed by: PHYSICIAN ASSISTANT

## 2021-06-11 PROCEDURE — 80048 BASIC METABOLIC PNL TOTAL CA: CPT

## 2021-06-11 PROCEDURE — 82962 GLUCOSE BLOOD TEST: CPT

## 2021-06-11 RX ORDER — FUROSEMIDE 10 MG/ML
20 INJECTION INTRAMUSCULAR; INTRAVENOUS ONCE
Status: COMPLETED | OUTPATIENT
Start: 2021-06-11 | End: 2021-06-11

## 2021-06-11 RX ADMIN — DOCUSATE SODIUM 50 MG AND SENNOSIDES 8.6 MG 1 TABLET: 8.6; 5 TABLET, FILM COATED ORAL at 19:36

## 2021-06-11 RX ADMIN — INSULIN LISPRO 3 UNITS: 100 INJECTION, SOLUTION INTRAVENOUS; SUBCUTANEOUS at 01:00

## 2021-06-11 RX ADMIN — Medication 400 MG: at 08:12

## 2021-06-11 RX ADMIN — FAMOTIDINE 20 MG: 20 TABLET ORAL at 08:13

## 2021-06-11 RX ADMIN — FENTANYL CITRATE 25 MCG: 50 INJECTION, SOLUTION INTRAMUSCULAR; INTRAVENOUS at 03:25

## 2021-06-11 RX ADMIN — PANTOPRAZOLE SODIUM 40 MG: 40 TABLET, DELAYED RELEASE ORAL at 08:13

## 2021-06-11 RX ADMIN — MUPIROCIN: 20 OINTMENT TOPICAL at 19:26

## 2021-06-11 RX ADMIN — MUPIROCIN: 20 OINTMENT TOPICAL at 08:13

## 2021-06-11 RX ADMIN — OXYCODONE HYDROCHLORIDE 5 MG: 5 TABLET ORAL at 12:26

## 2021-06-11 RX ADMIN — CLOPIDOGREL 75 MG: 75 TABLET, FILM COATED ORAL at 08:12

## 2021-06-11 RX ADMIN — DOCUSATE SODIUM 50 MG AND SENNOSIDES 8.6 MG 1 TABLET: 8.6; 5 TABLET, FILM COATED ORAL at 08:13

## 2021-06-11 RX ADMIN — Medication 10 ML: at 08:13

## 2021-06-11 RX ADMIN — SODIUM CHLORIDE: 9 INJECTION, SOLUTION INTRAVENOUS at 11:10

## 2021-06-11 RX ADMIN — FUROSEMIDE 20 MG: 10 INJECTION, SOLUTION INTRAMUSCULAR; INTRAVENOUS at 08:32

## 2021-06-11 RX ADMIN — PIPERACILLIN AND TAZOBACTAM 3375 MG: 3; .375 INJECTION, POWDER, LYOPHILIZED, FOR SOLUTION INTRAVENOUS at 07:05

## 2021-06-11 RX ADMIN — INSULIN LISPRO 3 UNITS: 100 INJECTION, SOLUTION INTRAVENOUS; SUBCUTANEOUS at 07:32

## 2021-06-11 RX ADMIN — SODIUM CHLORIDE: 9 INJECTION, SOLUTION INTRAVENOUS at 19:26

## 2021-06-11 RX ADMIN — SODIUM CHLORIDE: 9 INJECTION, SOLUTION INTRAVENOUS at 03:24

## 2021-06-11 RX ADMIN — IPRATROPIUM BROMIDE AND ALBUTEROL SULFATE 1 AMPULE: 2.5; .5 SOLUTION RESPIRATORY (INHALATION) at 20:24

## 2021-06-11 RX ADMIN — OXYCODONE HYDROCHLORIDE 10 MG: 10 TABLET ORAL at 23:24

## 2021-06-11 RX ADMIN — PIPERACILLIN AND TAZOBACTAM 3375 MG: 3; .375 INJECTION, POWDER, LYOPHILIZED, FOR SOLUTION INTRAVENOUS at 23:22

## 2021-06-11 RX ADMIN — ATORVASTATIN CALCIUM 80 MG: 80 TABLET, FILM COATED ORAL at 20:49

## 2021-06-11 RX ADMIN — PIPERACILLIN AND TAZOBACTAM 3375 MG: 3; .375 INJECTION, POWDER, LYOPHILIZED, FOR SOLUTION INTRAVENOUS at 14:57

## 2021-06-11 RX ADMIN — IPRATROPIUM BROMIDE AND ALBUTEROL SULFATE 1 AMPULE: 2.5; .5 SOLUTION RESPIRATORY (INHALATION) at 08:20

## 2021-06-11 RX ADMIN — IPRATROPIUM BROMIDE AND ALBUTEROL SULFATE 1 AMPULE: 2.5; .5 SOLUTION RESPIRATORY (INHALATION) at 16:01

## 2021-06-11 RX ADMIN — SODIUM CHLORIDE 30 ML/HR: 9 INJECTION, SOLUTION INTRAVENOUS at 06:46

## 2021-06-11 RX ADMIN — IPRATROPIUM BROMIDE AND ALBUTEROL SULFATE 1 AMPULE: 2.5; .5 SOLUTION RESPIRATORY (INHALATION) at 13:02

## 2021-06-11 RX ADMIN — ASPIRIN 81 MG: 81 TABLET, COATED ORAL at 08:12

## 2021-06-11 RX ADMIN — OXYCODONE HYDROCHLORIDE 5 MG: 5 TABLET ORAL at 08:12

## 2021-06-11 ASSESSMENT — PAIN SCALES - GENERAL
PAINLEVEL_OUTOF10: 0
PAINLEVEL_OUTOF10: 10
PAINLEVEL_OUTOF10: 5
PAINLEVEL_OUTOF10: 9
PAINLEVEL_OUTOF10: 8
PAINLEVEL_OUTOF10: 0

## 2021-06-11 ASSESSMENT — PAIN DESCRIPTION - PROGRESSION
CLINICAL_PROGRESSION: GRADUALLY WORSENING

## 2021-06-11 ASSESSMENT — PAIN DESCRIPTION - LOCATION
LOCATION: BACK;STERNUM
LOCATION: BACK;STERNUM

## 2021-06-11 ASSESSMENT — PAIN DESCRIPTION - PAIN TYPE
TYPE: SURGICAL PAIN;CHRONIC PAIN
TYPE: SURGICAL PAIN

## 2021-06-11 ASSESSMENT — PAIN DESCRIPTION - DESCRIPTORS
DESCRIPTORS: ACHING;DISCOMFORT
DESCRIPTORS: ACHING;DISCOMFORT

## 2021-06-11 ASSESSMENT — PAIN DESCRIPTION - ONSET: ONSET: ON-GOING

## 2021-06-11 ASSESSMENT — PAIN DESCRIPTION - FREQUENCY
FREQUENCY: INTERMITTENT
FREQUENCY: INTERMITTENT

## 2021-06-11 NOTE — PROGRESS NOTES
Date: 6/11/2021    Time: 12:00 AM    Patient Placed On BIPAP/CPAP/ Non-Invasive Ventilation? No    If no must comment. Remains on  Facial area red/color change? No           If YES are Blister/Lesion present? No   If yes must notify nursing staff  BIPAP/CPAP skin barrier?   Yes    Skin barrier type:mepilexlite       Comments:        Crys Gonzales RCP

## 2021-06-11 NOTE — CARE COORDINATION
SOCIAL WORK/CASEMANAGEMENT TRANSITION OF CARE [id number] [place]  [facility], [address], [facility] , [phone]): I met with pt and spouse in the unit this a.m. provided him with jake and hhc lists. Went over need for 24 hour care from him for the first 1-2 weeks with hhc vs jake if needed. Sw/cm to follow.  Nel Lyons, MU  6/11/2021

## 2021-06-11 NOTE — PROGRESS NOTES
[facility] [address]    Name: Shan Valle    Age: 78 y.o. PCP: No primary care provider on file. Date of Admission: 6/7/2021  1:23 PM    Date of Service: 6/11/2021    Chief Complaint: Follow-up for CAD  CABG 6/9/2021: LIMA>LAD,VG>ramus>OM, VG>PDA    Interim History:  . Sent by Dr Guevara for cath from [place]. Did have multivessel disease had CABG 6/9/21 X4  with Dr Shawna Terrazas. and went back to OR for bleeding. Now extubated, off Levophed. Thirsty and sore, denies dyspnea    Telemetry personally reviewed and showed sinus tachy    Review of Systems: On vent    Problem List:  Active Problems:    CAD in native artery    Postprocedural hypotension    Acute pulmonary insufficiency    Acute blood loss anemia    Hypoxia  Resolved Problems:    * No resolved hospital problems. *      Past Medical History:  Past Medical History:   Diagnosis Date    CAD in native artery 6/7/2021    COPD (chronic obstructive pulmonary disease) (HCC)     Hyperlipidemia     Hypertension        Allergies:   Allergies   Allergen Reactions    Tramadol Nausea Only       Current Medications:  Current Facility-Administered Medications   Medication Dose Route Frequency Provider Last Rate Last Admin    norepinephrine (LEVOPHED) 16 mg in dextrose 5% 250 mL infusion  2 mcg/min Intravenous Continuous PRN Christianna Fabry, PA   Stopped at 06/11/21 0400    0.9 % sodium chloride infusion  30 mL/hr Intravenous Continuous Elzie Ridges, DO 30 mL/hr at 06/11/21 0646 30 mL/hr at 06/11/21 0646    sodium chloride flush 0.9 % injection 10 mL  10 mL Intravenous 2 times per day Elzie Ridges, DO   10 mL at 06/11/21 0813    sodium chloride flush 0.9 % injection 10 mL  10 mL Intravenous PRN Elzie Ridges, DO   10 mL at 06/09/21 1801    ondansetron (ZOFRAN) injection 4 mg  4 mg Intravenous Q8H PRN Elzie Ridges, DO        aspirin EC tablet 81 mg  81 mg Oral Daily Elzie Dres, DO   81 mg at 06/11/21 6084    acetaminophen (TYLENOL) tablet 650 mg  650 mg Oral Q4H PRN Louretta Riley, DO        acetaminophen (TYLENOL) suppository 650 mg  650 mg Rectal Q4H PRN Louretta Riley, DO        oxyCODONE (ROXICODONE) immediate release tablet 5 mg  5 mg Oral Q4H PRN Louretta Riley, DO   5 mg at 06/11/21 3537    Or    oxyCODONE HCl (OXY-IR) immediate release tablet 10 mg  10 mg Oral Q4H PRN Louretta Riley, DO        fentaNYL (SUBLIMAZE) injection 25 mcg  25 mcg Intravenous Q1H PRN Louretta Riley, DO   25 mcg at 06/11/21 0325    Or    fentaNYL (SUBLIMAZE) injection 50 mcg  50 mcg Intravenous Q1H PRN Louretta Riley, DO        chlorhexidine (PERIDEX) 0.12 % solution 15 mL  15 mL Mouth/Throat BID Louretta Riley, DO   15 mL at 06/10/21 0757    magnesium oxide (MAG-OX) tablet 400 mg  400 mg Oral Daily Louretta Riley, DO   400 mg at 06/11/21 8257    mupirocin (BACTROBAN) 2 % ointment   Nasal BID Louretta Riley, DO   Given at 06/11/21 0813    sennosides-docusate sodium (SENOKOT-S) 8.6-50 MG tablet 1 tablet  1 tablet Oral BID Louretta Riley, DO   1 tablet at 06/11/21 0813    magnesium hydroxide (MILK OF MAGNESIA) 400 MG/5ML suspension 30 mL  30 mL Oral Daily PRN Louretta Riley, DO        pantoprazole (PROTONIX) tablet 40 mg  40 mg Oral Daily Louretta Riley, DO   40 mg at 06/11/21 0813    potassium chloride 20 mEq/50 mL IVPB (Central Line)  20 mEq Intravenous PRN Louretta Riley, DO   Stopped at 06/10/21 0040    magnesium sulfate 2000 mg in 50 mL IVPB premix  2,000 mg Intravenous PRN Louretta Riley, DO   Stopped at 06/10/21 0751    ipratropium-albuterol (DUONEB) nebulizer solution 1 ampule  1 ampule Inhalation Q4H BRITTANY Abad, DO   1 ampule at 06/11/21 0820    albumin human 5 % IV solution 25 g  25 g Intravenous PRN Lorenzaa Riley, DO   Stopped at 06/09/21 2014    clevidipine (CLEVIPREX) infusion  2 mg/hr Intravenous Continuous PRN Juanita Lin, DO        glucose (GLUTOSE) 40 % oral gel 15 g  15 g Oral PRN Lorenzaa Riley, DO        dextrose 50 % IV solution  12.5 g Intravenous PRN Penny Cast, DO        glucagon (rDNA) injection 1 mg  1 mg Intramuscular PRN Penny Cast, DO        dextrose 5 % solution  100 mL/hr Intravenous PRN Penny Cast, DO        calcium gluconate 1,000 mg in dextrose 5 % 100 mL IVPB  1,000 mg Intravenous PRN Penny Cast, DO   Stopped at 06/10/21 0859    clopidogrel (PLAVIX) tablet 75 mg  75 mg Oral Daily Penny Cast, DO   75 mg at 06/11/21 0812    insulin lispro (HUMALOG) injection vial 0-18 Units  0-18 Units Subcutaneous Q4H Penny Cast, DO   3 Units at 06/11/21 0732    famotidine (PEPCID) tablet 20 mg  20 mg Oral Daily Wisam Fusi, DO   20 mg at 06/11/21 0813    albumin human 25 % IV solution 25 g  25 g Intravenous Once Cozette Harsh, APRN - CNP        piperacillin-tazobactam (ZOSYN) 3,375 mg in dextrose 5 % 100 mL IVPB extended infusion (mini-bag)  3,375 mg Intravenous Q8H Catana Needle, PA-C 25 mL/hr at 06/11/21 0705 3,375 mg at 06/11/21 0409    And    0.9 % sodium chloride infusion admixture   Intravenous Q8H Catana Needle, PA-C   Stopped at 06/11/21 0542    atorvastatin (LIPITOR) tablet 80 mg  80 mg Oral Nightly Penny Cast, DO   80 mg at 06/10/21 2121      norepinephrine Stopped (06/11/21 0400)    sodium chloride 30 mL/hr (06/11/21 0646)    clevidipine      dextrose         Physical Exam:  BP (!) 104/52   Pulse 101   Temp 99.7 °F (37.6 °C) (Temporal)   Resp (!) 34   Ht 5' 1.5\" (1.562 m)   Wt 115 lb 4.8 oz (52.3 kg)   SpO2 97%   BMI 21.43 kg/m²   Weight change: Wt Readings from Last 3 Encounters:   06/10/21 115 lb 4.8 oz (52.3 kg)     General: s/p CABG in bed no acute distress   neck: No JVD, thyromegaly,  CV: RRR  Resp: Scattered congestion unlabored respirations chest tubes in place  Abdomen:  nondistended,   Extremities: no cyanosis, clubbing, or edema.    Skin: Warm and dry, no rashes or lesions  Neuro:sedated    Intake/Output:    Intake/Output Summary (Last 24 hours) at 6/11/2021 1105  Last data filed at 6/11/2021 1000  Gross per 24 hour   Intake 1700.71 ml   Output 1285 ml   Net 415.71 ml     I/O this shift:  In: 120 [P.O.:120]  Out: 655 [Urine:635; Chest Tube:20]    Laboratory Tests:  Last 3 CBC:  Recent Labs     06/09/21  2240 06/10/21  0410 06/11/21 0416   WBC 8.5 9.4 9.6   RBC 3.18* 3.42* 3.09*   HGB 9.3* 9.9* 9.1*   HCT 29.0* 30.4* 28.1*   MCV 91.2 88.9 90.9   MCH 29.2 28.9 29.4   MCHC 32.1 32.6 32.4   RDW 14.3 14.3 14.8   PLT 96* 111* 90*   MPV 10.6 10.7 12.2*       Last 3 CMP:    Recent Labs     06/09/21  2240 06/10/21  0410 06/10/21  0858 06/10/21  0948 06/11/21 0416    146  --   --  140   K 3.9 4.2 3.98 3.91 4.1   * 111*  --   --  105   CO2 24 25  --   --  22   BUN 12 12  --   --  17   CREATININE 0.9 0.9  --   --  0.9   GLUCOSE 220* 158*  --   --  143*   CALCIUM 7.8* 8.3*  --   --  8.6       Last 3 Mag/Phos:  Recent Labs     06/09/21  1220 06/10/21  0410 06/11/21 0416   MG 2.4 1.8 2.2       Last 3 CK, CKMB, Troponin  No results for input(s): CKTOTAL, CKMB, TROPONINI in the last 72 hours. Last 3 BNP:  No results for input(s): BNP in the last 72 hours. No results found for: BNP    Last 3 Glucose:     Recent Labs     06/09/21  2240 06/10/21  0410 06/11/21  0416   GLUCOSE 220* 158* 143*       Last 3 Coags:  Recent Labs     06/09/21  1220   PROTIME 15.6*   INR 1.4     Lab Results   Component Value Date    PROTIME 15.6 06/09/2021    INR 1.4 06/09/2021       Last 3 Lipid Panel:  No results for input(s): LDLCALC, HDL, TRIG in the last 72 hours. Invalid input(s): CHLPL  No results found for: LDLCALC  No results found for: HDL  No results found for: TRIG  No components found for: CHLPL    TSH:  No results for input(s): TSH in the last 72 hours. No results found for: TSH        Radiology:  XR CHEST PORTABLE   Final Result   No interval change.   Findings suggest possible CHF with significant basilar   infiltrates worse on the left         XR CHEST multivessel disease s/p CABGX4 on  6/9/21. Continue present supportive post op carecare. Extubated, chest tubes per CT. Statin and when off levo can try to resume beta blocker. Some gentle diuresis secondary to multiple blood products postop. 2 Hyperlipidemia- on statin  3 Anemia secondary to postop bleeding- replaced and fixed- Hg better.       Aurea Sandoval MD, MD, [address] [place] [address] at [facility]    Electronically signed by Aurea Sandoval MD on 6/11/2021 at 11:05 AM

## 2021-06-11 NOTE — PROGRESS NOTES
Date: 6/10/2021    Time: 8:39 PM    Patient Placed On BIPAP/CPAP/ Non-Invasive Ventilation? Yes    If no must comment. Facial area red/color change? No           If YES are Blister/Lesion present? No   If yes must notify nursing staff  BIPAP/CPAP skin barrier?   Yes    Skin barrier type:mepilexlite       Comments:        Veena Gerber RCP

## 2021-06-11 NOTE — PLAN OF CARE
Problem: Pain:  Goal: Pain level will decrease  Description: Pain level will decrease  6/11/2021 0134 by Charlene Wright RN  Outcome: Met This Shift  6/86/9229 1075 by Sheree Loza RN  Outcome: Met This Shift  Goal: Control of acute pain  Description: Control of acute pain  4/93/2086 9029 by Sheree Loza RN  Outcome: Met This Shift  Goal: Control of chronic pain  Description: Control of chronic pain  6/04/6113 0718 by Sheree Loza RN  Outcome: Ongoing     Problem: Falls - Risk of:  Goal: Will remain free from falls  Description: Will remain free from falls  6/11/2021 0134 by Charlene Wright RN  Outcome: Met This Shift  3/29/0821 2575 by Sheree Loza RN  Outcome: Met This Shift  Goal: Absence of physical injury  Description: Absence of physical injury  8/48/5100 2036 by Sheree Loza RN  Outcome: Met This Shift     Problem:  Activity Intolerance:  Goal: Able to perform prescribed physical activity  Description: Able to perform prescribed physical activity  Outcome: Not Met This Shift     Problem: Anxiety:  Goal: Level of anxiety will decrease  Description: Level of anxiety will decrease  6/11/2021 0134 by Charlene Wright RN  Outcome: Not Met This Shift  8/95/4442 3562 by Sheree Loza RN  Outcome: Met This Shift     Problem: Cardiac Output - Decreased:  Goal: Cardiac output within specified parameters  Description: Cardiac output within specified parameters  1/31/7188 2579 by Sheree Loza RN  Outcome: Met This Shift  Goal: Hemodynamic stability will improve  Description: Hemodynamic stability will improve  5/71/4125 2582 by Sheree Loza RN  Outcome: Met This Shift     Problem: Fluid Volume - Imbalance:  Goal: Ability to achieve a balanced intake and output will improve  Description: Ability to achieve a balanced intake and output will improve  6/11/2021 0134 by Charlene Wright RN  Outcome: Not Met This Shift  1/04/9794 9984 by Sheree Loza RN  Outcome: Not Met This Shift  Goal: Chest tube drainage is within specified parameters  Description: Chest tube drainage is within specified parameters  3/26/3447 5899 by Dhruv Cruz RN  Outcome: Met This Shift     Problem: Gas Exchange - Impaired:  Goal: Levels of oxygenation will improve  Description: Levels of oxygenation will improve  6/11/2021 0134 by Darryle Gutter, RN  Outcome: Met This Shift  7/66/6681 1096 by Dhruv Cruz RN  Outcome: Not Met This Shift  Goal: Ability to maintain adequate ventilation will improve  Description: Ability to maintain adequate ventilation will improve  7/61/5102 2016 by Dhruv Cruz RN  Outcome: Not Met This Shift     Problem: Pain:  Goal: Pain level will decrease  Description: Pain level will decrease  6/11/2021 0134 by Darryle Gutter, RN  Outcome: Met This Shift  8/82/7299 1880 by Dhruv Cruz RN  Outcome: Met This Shift  Goal: Control of acute pain  Description: Control of acute pain  1/35/3144 5017 by Dhruv Cruz RN  Outcome: Met This Shift  Goal: Control of chronic pain  Description: Control of chronic pain  6/17/6042 2896 by Dhruv Cruz RN  Outcome: Met This Shift     Problem: Tissue Perfusion - Cardiopulmonary, Altered:  Goal: Absence of angina  Description: Absence of angina  6/13/1767 3525 by Dhruv Cruz RN  Outcome: Ongoing  Goal: Hemodynamic stability will improve  Description: Hemodynamic stability will improve  8/30/9887 3658 by Dhruv Cruz RN  Outcome: Ongoing  Goal: Will show no evidence of cardiac arrhythmias  Description: Will show no evidence of cardiac arrhythmias  2/58/9370 3643 by Dhruv Cruz RN  Outcome: Met This Shift     Problem: Tobacco Use:  Goal: Will participate in inpatient tobacco-use cessation counseling  Description: Will participate in inpatient tobacco-use cessation counseling  8/53/3182 7940 by Dhruv Cruz RN  Outcome: Not Met This Shift     Problem: Skin Integrity:  Goal: Will show no infection signs and symptoms  Description: Will show no infection signs and symptoms  3/91/3962 5570 by Dhruv Cruz RN  Outcome:  Met This Shift  Goal: Absence of new skin breakdown  Description: Absence of new skin breakdown  8/44/0495 9501 by Dhruv Cruz RN  Outcome: Met This Shift

## 2021-06-11 NOTE — PROGRESS NOTES
[address]     Date:2021                                                               Patient Name: Taylor Julien  MRN: [id number]  : 1950  Room: [address] [place] [address]    Evaluating OT: Judi Singh, OTR/L [id number]    Referring Provider: Nikita Peña DO   Specific Provider Orders/Date: OT eval and treat (21)      Diagnosis: Severe MV CAD; NSTEMI      Surgery/Procedures:  CABG X 4      6/10  1. Re-exploration of mediastinum. 2.  Mediastinal washout. 3.  Evacuation of hematoma. 4.  Control of hemorrhage. Pertinent Medical History: CAD, COPD, HTN, HLD     *Precautions:  Fall Risk, sternal, chest tubes x 2, BIPAP    Assessment of current deficits   [x]Functional mobility  [x]ADLs [x]Strength  []Cognition  [x]Functional transfers  [x]IADLs [x]Safety Awareness  [x]Endurance  []Fine Motor Coordination  [x]Balance       []Vision/perception  []Sensation    []Gross Motor Coordination [x]ROM  []Delirium                  [] Motor Control     []Communication     OT PLAN OF CARE   OT POC based on physician orders, patient diagnosis and results of clinical assessment.        Frequency/Duration: 1-3 days/wk for 1-2 weeks PRN     Specific OT Treatment to include:   ADL retraining/adapted techniques and AE recommendations to increase functional independence within precautions                    Energy conservation techniques to improve tolerance for selfcare routine   Functional transfer/mobility training/DME recommendations for increased independence, safety and fall prevention         Patient/family education to increase safety and functional independence within precautions             Environmental modifications for safe mobility and completion of ADLs                             Therapeutic activity to improve functional performance during ADLs                                         Therapeutic exercise to improve tolerance and functional strength for ADLs   Balance retraining exercises/tasks for facilitation of postural control with dynamic challenges during ADLs . Recommended Adaptive Equipment:  LB dressing AE as needed for energy conservation, shower seat    Home Living: Pt lives with   in a 2 floor plan with 1 + \" a couple\" step(s) to kitchen. Bathrooms on both levels; pt will need a bed on first floor for first floor living. Bathroom setup: tub/shower; standard height commode  Equipment owned: shower seat, walker, SPC    Prior Level of Function: IND with ADLs;  IND with IADLs. No device for ambulation. Driving: yes    Pain Level: pt c/o 7/10 chest pain  this session    Cognition: A&O: 4/4    Follows 1-2 step commands appropriately. Memory: Good   Comprehension Good   Problem solving: Fair   Judgement/safety: Fair- (regarding BIPAP)               Communication skills: WFL           Vision: WFL               Glasses: yes                                                   Hearing: WFL     RASS: 0  CAM-ICU: (NT) Delirium     UE Assessment:  Hand Dominance: Right [x]  Left []     ROM Strength STM goal: PRN   RUE  Grossly WFL within precautions Not formally tested; grossly WFL              WNL for ADLS     LUE Grossly WFL within precautions Not formally tested; grossly WFL              WNL for ADLS       Sensation: No c/o numbness or tingling in extremities   Tone: WNL   Edema: WFL     Functional Assessment: AM-PAC Daily Activity Raw Score: 13/24   Initial Eval Status  Date: 6/11 Treatment Status  Date: STG=LTG  Time Frame: 5-7 days   Feeding NT due to BIPAP                       Manfred  while seated up in chair to increase activity tolerance        Grooming Max A                       S   while standing sink level demonstrating G tolerance; G balance.      UB dressing/bathing Max A                       Min A   demonstrating G knowledge of precautions during tasks     LB dressing/bathing Max A  (crossing LE's; decreased activity tolerance/breathing)                       Min A  using AE as needed for safe reach/ energy conservation       Toileting NT                       Min A     Bed Mobility  Supine to sit:   Mod A+2    Sit to supine: NT                       Min A  in prep of ADL tasks & transfers   Functional Transfers Sit to stand: Min A  from higher bed surface;    Stand to sit: Min A                       S  sit<>stand/functional bathroom transfers using AD/DME as needed for balance and safety   Functional Mobility Min A  no device                        S   functional/bathroom mobility using AD as needed & demonstrating G safety     Balance Sitting:     Static:  S    Dynamic:Min A  Standing: Min A  S dynamic sitting balance; S dynamic standing balance  during ADL tasks & transfers   Endurance/Activity Tolerance   F tolerance with light activity. G   tolerance with moderate activity/self care routine   Visual/  Perceptual               WFL                          Vitals:   HR at rest: 104 bpm HR at end of session: 112 bpm   Spo2 at rest:97% Spo2 at end of session 99%   BP at rest:139/73 mmHg BP at end of session 142/73 mmHg       Treatment: OT intervention provided this date includes:  Functional mobility: Instruction on sternal precautions to facilitate safe bed mobility & functional transfers. Pt required 2 person assist for safe mobility due to complexity of medical condition, medical lines and deconditioning. HOB elevated to assist.    ADL retraining:Pt instructed on sternal precautions regarding ADLs; demo fair understanding. Pt limited due to SOB/endurance level. Energy Conservation training: Education on postural awareness/positioning and breathing techniques to improve overall tolerance and participation in self care routine. Pt demonstrated fair tolerance.   Review of recommended DME for fall prevention, bathroom safety & energy conservation. Pt/Family Education: Instruction with handouts on energy conservation and sternal precautions during functional activities for safe return home. Pt/family demonstrates fair understanding. Line management and environmental modifications made prior to and end of session to ensure patient safety and to increase efficiency of session. Skilled monitoring of HR, O2 saturation, blood pressure and patient's response to activity performed throughout session. Comments: OK from RN to see patient. Upon arrival, patient supine in bed, agreeable to session. At end of session, patient left seated up in chair to increase activity tolerance. Call light within reach, all lines and tubes intact. Pt instructed on use of call light for assistance and fall prevention. Patient presents with decreased activity tolerance, dynamic balance, functional mobility limiting completion of ADLs and safety. Pt can benefit from continued skilled OT to increase safety, functional independence and quality of life. Rehab Potential: good for established goals    Patient / Family Goal: return to PLOF    Patient and/or family were instructed/educated on diagnosis, prognosis/goals and plan of care. Pt demonstrated F understanding. [] Malnutrition indicators have been identified and nursing has been notified to ensure a dietitian consult is ordered. Evaluation Complexity: Moderate    · History: Expanded chart review of consults, imaging, and psychosocial history related to current functional performance. · Exam: 5+ performance deficits identified limiting functional independence and safe return home   · Assistance/Modification: Min/mod assistance or modifications required to perform tasks. May have comorbidities that affect occupational performance.     Time In:1038              Time Out: 1100               Min Units   OT Eval Low 55528     OT Eval Medium 51000 X    OT Eval High [id number]     OT Re-Eval [id number]     Therapeutic Ex [phone]     Therapeutic Activities 99227     ADL/Self Care 19369     Orthotic Management 52783     Neuro Re-Ed 36148     Non-Billable Time       Evaluation time includes thorough review of current medical information, gathering information on past medical history/social history and prior level of function, completion of standardized testing/informal observation of tasks, assessment of data and development of POC/Goals.      Fernando Aguayo, OTR/L 1183

## 2021-06-11 NOTE — PROGRESS NOTES
Comprehensive Nutrition Assessment    Type and Reason for Visit:  Initial    Nutrition Recommendations/Plan: Continue current diet, Start Glucerna & Lele BID    Nutrition Assessment:  Pt admit 2/2 NSTEMI s/p CABG x4 w/ increased nutrient needs for surgical healing. Noted malnutrition. Will add ONS to aid in recovery and continue to monitor. Malnutrition Assessment:  Malnutrition Status: Moderate malnutrition    Context:  Chronic Illness     Findings of the 6 clinical characteristics of malnutrition:  Energy Intake:  7 - 75% or less estimated energy requirements for 1 month or longer  Weight Loss:  Unable to assess (no wt hx on file)     Body Fat Loss:  1 - Mild body fat loss Orbital   Muscle Mass Loss:  1 - Mild muscle mass loss Temples (temporalis), Clavicles (pectoralis & deltoids)  Fluid Accumulation:  No significant fluid accumulation     Strength:  Not Performed    Estimated Daily Nutrient Needs:  Energy (kcal):  4567-2080; Weight Used for Energy Requirements:  Current     Protein (g):  70-80 (1.5-1.8); Weight Used for Protein Requirements:  Admission        Fluid (ml/day):  critical care; Method Used for Fluid Requirements:         Nutrition Related Findings:  pt alert, active BS, soft abd, bipap, CT x3, trace generalized edema, +I/Os      Wounds:  Surgical Incision       Current Nutrition Therapies:    ADULT DIET;  Regular; Low Fat/Low Chol/High Fiber/2 gm Na    Anthropometric Measures:  · Height: 5' 1.5\" (156.2 cm)  · Current Body Weight: 99 lb (44.9 kg) (6/8 actual - admit wt, CBW elevated since admit w/ +fluids)   · Admission Body Weight: 99 lb (44.9 kg) (6/8 actual)    · Usual Body Weight:  (UTO, no wt hx on file)     · Ideal Body Weight: 108 lbs; % Ideal Body Weight 91.7 %   · BMI: 18.4  · BMI Categories: Underweight (BMI less than 22) age over 72       Nutrition Diagnosis:   · Moderate malnutrition, In context of chronic illness related to cardiac dysfunction as evidenced by poor intake prior to admission, mild loss of subcutaneous fat, mild muscle loss    Nutrition Interventions:   Food and/or Nutrient Delivery:  Continue Current Diet, Start Oral Nutrition Supplement (Glucerna BID, Lele BID)  Nutrition Education/Counseling:  Education not appropriate   Coordination of Nutrition Care:  Continue to monitor while inpatient    Goals:  pt to consume >50% meals/ONS       Nutrition Monitoring and Evaluation:   Food/Nutrient Intake Outcomes:  Food and Nutrient Intake, Supplement Intake  Physical Signs/Symptoms Outcomes:  Biochemical Data, GI Status, Fluid Status or Edema, Hemodynamic Status, Nutrition Focused Physical Findings, Skin, Weight     Discharge Planning:     Too soon to determine     Electronically signed by Radha Zhang, MS, RD, LD on 6/11/21 at 12:10 PM EDT    Contact: [phone]

## 2021-06-11 NOTE — PROGRESS NOTES
prognosis, and plan of care. Yes    PHYSICAL THERAPY PLAN OF CARE:    PT POC is established based on physician order and patient diagnosis     Referring provider/PT Order:  Sheridan Leonard, DO/ 06/10/21 0600 PT eval and treat  Diagnosis:  CAD in native artery [I25.10]  Specific instructions for next treatment:  Progress ambulation and tolerance to upright activity. Current Treatment Recommendations:     [x] Strengthening to improve independence with functional mobility   [] ROM to improve independence with functional mobility   [x] Balance Training to improve static/dynamic balance and to reduce fall risk  [x] Endurance Training to improve activity tolerance during functional mobility   [x] Transfer Training to improve safety and independence with all functional transfers   [x] Gait Training to improve gait mechanics, endurance and asses need for appropriate assistive device  [x] Stair Training in preparation for safe discharge home and/or into the community   [] Positioning to prevent skin breakdown and contractures  [x] Safety and Education Training   [x] Patient/Caregiver Education   [] HEP  [] Other     PT long term treatment goals are located in above grid    Frequency of treatments: 2-5x/week x 1-2 weeks. Time in  1030  Time out  1050    Total Treatment Time  10 minutes     Evaluation Time includes thorough review of current medical information, gathering information on past medical history/social history and prior level of function, completion of standardized testing/informal observation of tasks, assessment of data and education on plan of care and goals.     CPT codes:  [] Low Complexity PT evaluation 92249  [x] Moderate Complexity PT evaluation 14706  [] High Complexity PT evaluation 22048  [] PT Re-evaluation 01180  [] Gait training 69075 - minutes  [] Manual therapy 61305 - minutes  [x] Therapeutic activities 18558 10 minutes  [] Therapeutic exercises 60653 - minutes  [] Neuromuscular reeducation 90030 - [facility]     Tereso Raines, MARI  [id number]

## 2021-06-11 NOTE — PROGRESS NOTES
CVICU Progress Note    Name: Joanna Phoenix  MRN: [id number]    CC: Postoperative Critical Care Management     Indication for Surgery/Procedure: MVCAD, NSTEMI  LVEF: NML    RVF:  NML     Important/Relevant PMH/PSH: HTN, HLD, COPD     Procedure/Surgeries:   6/9/2021 CABG x4 (LIMA-LAD, HYZ-Yzzky-DU sequential, SVG-PDA)     6/10/2021 Exploration of mediastinum, Washout  Control of hemorrhage (small branch of left subclavian vein)     Pacing wires: Ventricular         Intake/Output Summary (Last 24 hours) at 6/11/2021 0853  Last data filed at 6/11/2021 0800  Gross per 24 hour   Intake 1700.71 ml   Output 845 ml   Net 855.71 ml       Recent Labs     06/09/21  2240 06/10/21  0410 06/11/21  0416   WBC 8.5 9.4 9.6   HGB 9.3* 9.9* 9.1*   HCT 29.0* 30.4* 28.1*   PLT 96* 111* 90*      Recent Labs     06/09/21  2240 06/10/21  0410 06/10/21  0858 06/10/21  0948 06/11/21  0416    146  --   --  140   K 3.9 4.2 3.98 3.91 4.1   * 111*  --   --  105   CO2 24 25  --   --  22   BUN 12 12  --   --  17   CREATININE 0.9 0.9  --   --  0.9   GLUCOSE 220* 158*  --   --  143*   CALCIUM 7.8* 8.3*  --   --  8.6         Physical Exam:    BP (!) 104/52   Pulse 103   Temp 99.7 °F (37.6 °C) (Temporal)   Resp 25   Ht 5' 1.5\" (1.562 m)   Wt 115 lb 4.8 oz (52.3 kg)   SpO2 (!) 87%   BMI 21.43 kg/m²       CXR Findings: 6/11/2021      FINDINGS:   Bilateral infiltrates and effusions particularly at the the left base are   unchanged.  There has been sternotomy and bypass.  Tubes and right-sided central line are unchanged. ---  CXR personally viewed and interpreted by ICU Nurse Practitioner, agree with above findings     General: Awake, alert. SOB   Eyes: PERRL, anicteric   Pulmonary: Diminished bibasilar.  No wheezes, no accessory muscle use noted on bipap   Cardiovascular:  RRR, no heaves or thrills on palpation  Tele: ST  Abdomen: Soft, nontender, + BS   Extremities: Palpable pulses all extremities, trace edema   Neurologic/Psych: A&Ox3, MART to command   Skin: Warm and dry  Incisions: MSI with stephani dressing intact      Assessment/Plan: POD #  1. MVCAD S/p CABGx4   - DAPT, Lipitor, likely resume BB later today    - empiric zosyn after emergent chest exploration   - Discuss removal of chest tubes today   - Pt/OT      2. Hypoxia   -Requiring intermittent bipap and  15L high flow NC, CXR congested, dosed lasix today with good response     3. Hypotension   -resolved. Norepi turned off this morning     4. Acute blood loss anemia  -postop course c/b bleeding, complete white out of left chest on CXR---transfused 2prbc, 2ffp, plts and returned to OR for control of bleeding   -H/H stable         VTE Prophylaxis: Pharmacologic/Mechanical: Yes, SCDs   Line infection prevention: Can CVC or arterial line be removed: No   Continued need for urinary catheter:  Yes - clinical indication: Patient post major surgery requiring fluid balance and input and output measurement.     Dispo: CVICU until oxygenation improves     Electronically signed by NOMAN Thornton CNP on 6/11/2021 at 8:53 AM

## 2021-06-11 NOTE — PLAN OF CARE
Problem: Pain:  Goal: Pain level will decrease  Description: Pain level will decrease  6/11/2021 1925 by Divya Lai  Outcome: Met This Shift     Problem: Pain:  Goal: Control of acute pain  Description: Control of acute pain  6/11/2021 1925 by Divya Lai  Outcome: Met This Shift     Problem: Pain:  Goal: Control of chronic pain  Description: Control of chronic pain  6/11/2021 1925 by Divya Lai  Outcome: Met This Shift     Problem: Falls - Risk of:  Goal: Will remain free from falls  Description: Will remain free from falls  6/11/2021 1925 by Divya Lai  Outcome: Met This Shift     Problem: Falls - Risk of:  Goal: Absence of physical injury  Description: Absence of physical injury  6/11/2021 1925 by Divya Lai  Outcome: Met This Shift

## 2021-06-11 NOTE — PLAN OF CARE
Problem: Pain:  Goal: Pain level will decrease  Description: Pain level will decrease  3/66/1227 6821 by Jace Jaimes RN  Outcome: Met This Shift  6/11/2021 0134 by Jennings Denver, RN  Outcome: Met This Shift  Goal: Control of acute pain  Description: Control of acute pain  Outcome: Met This Shift  Goal: Control of chronic pain  Description: Control of chronic pain  Outcome: Met This Shift     Problem: Falls - Risk of:  Goal: Will remain free from falls  Description: Will remain free from falls  2/65/5493 4758 by Jace Jaimes RN  Outcome: Met This Shift  6/11/2021 0134 by Jennings Denver, RN  Outcome: Met This Shift  Goal: Absence of physical injury  Description: Absence of physical injury  Outcome: Met This Shift     Problem:  Activity Intolerance:  Goal: Able to perform prescribed physical activity  Description: Able to perform prescribed physical activity  1/45/1621 3822 by Jace Jaimes RN  Outcome: Ongoing  6/11/2021 0134 by Jennings Denver, RN  Outcome: Not Met This Shift  Goal: Ability to tolerate increased activity will improve  Description: Ability to tolerate increased activity will improve  Outcome: Ongoing     Problem: Anxiety:  Goal: Level of anxiety will decrease  Description: Level of anxiety will decrease  5/07/1736 4643 by Jace Jaimes RN  Outcome: Met This Shift  6/11/2021 0134 by Jennings Denver, RN  Outcome: Not Met This Shift     Problem: Cardiac Output - Decreased:  Goal: Cardiac output within specified parameters  Description: Cardiac output within specified parameters  Outcome: Ongoing  Goal: Hemodynamic stability will improve  Description: Hemodynamic stability will improve  Outcome: Ongoing     Problem: Fluid Volume - Imbalance:  Goal: Ability to achieve a balanced intake and output will improve  Description: Ability to achieve a balanced intake and output will improve  8/54/1789 3748 by Jace Jaimes RN  Outcome: Ongoing  6/11/2021 0134 by Jennings Denver, RN  Outcome: Not Met This Shift  Goal: Chest tube drainage is within specified parameters  Description: Chest tube drainage is within specified parameters  Outcome: Met This Shift     Problem: Gas Exchange - Impaired:  Goal: Levels of oxygenation will improve  Description: Levels of oxygenation will improve  9/01/5473 1173 by Lobito Prince RN  Outcome: Ongoing  6/11/2021 0134 by Niyah Trejo RN  Outcome: Met This Shift  Goal: Ability to maintain adequate ventilation will improve  Description: Ability to maintain adequate ventilation will improve  Outcome: Ongoing     Problem: Pain:  Goal: Pain level will decrease  Description: Pain level will decrease  5/51/2516 0516 by Lobito Prince RN  Outcome: Met This Shift  6/11/2021 0134 by Niyah Trejo RN  Outcome: Met This Shift

## 2021-06-12 ENCOUNTER — APPOINTMENT (OUTPATIENT)
Dept: GENERAL RADIOLOGY | Age: 71
DRG: 233 | End: 2021-06-12
Attending: FAMILY MEDICINE
Payer: MEDICARE

## 2021-06-12 LAB
ANION GAP SERPL CALCULATED.3IONS-SCNC: 12 MMOL/L (ref 7–16)
B.E.: -0.1 MMOL/L (ref -3–3)
BLOOD BANK DISPENSE STATUS: NORMAL
BLOOD BANK PRODUCT CODE: NORMAL
BPU ID: NORMAL
BUN BLDV-MCNC: 26 MG/DL (ref 6–23)
CALCIUM SERPL-MCNC: 8.8 MG/DL (ref 8.6–10.2)
CHLORIDE BLD-SCNC: 105 MMOL/L (ref 98–107)
CO2: 24 MMOL/L (ref 22–29)
COHB: 0.3 % (ref 0–1.5)
CREAT SERPL-MCNC: 0.9 MG/DL (ref 0.5–1)
CRITICAL: ABNORMAL
DATE ANALYZED: ABNORMAL
DATE OF COLLECTION: ABNORMAL
DESCRIPTION BLOOD BANK: NORMAL
GFR AFRICAN AMERICAN: >60
GFR NON-AFRICAN AMERICAN: >60 ML/MIN/1.73
GLUCOSE BLD-MCNC: 126 MG/DL (ref 74–99)
HCO3: 24 MMOL/L (ref 22–26)
HCT VFR BLD CALC: 27 % (ref 34–48)
HEMOGLOBIN: 8.8 G/DL (ref 11.5–15.5)
HHB: 6.3 % (ref 0–5)
LAB: ABNORMAL
Lab: ABNORMAL
MAGNESIUM: 2.2 MG/DL (ref 1.6–2.6)
MCH RBC QN AUTO: 29.3 PG (ref 26–35)
MCHC RBC AUTO-ENTMCNC: 32.6 % (ref 32–34.5)
MCV RBC AUTO: 90 FL (ref 80–99.9)
METER GLUCOSE: 122 MG/DL (ref 74–99)
METER GLUCOSE: 124 MG/DL (ref 74–99)
METER GLUCOSE: 133 MG/DL (ref 74–99)
METER GLUCOSE: 136 MG/DL (ref 74–99)
METHB: 0 % (ref 0–1.5)
MODE: ABNORMAL
O2 SATURATION: 93.7 % (ref 92–98.5)
O2HB: 93.4 % (ref 94–97)
OPERATOR ID: ABNORMAL
PATIENT TEMP: 37 C
PCO2: 37 MMHG (ref 35–45)
PDW BLD-RTO: 14.8 FL (ref 11.5–15)
PH BLOOD GAS: 7.43 (ref 7.35–7.45)
PLATELET # BLD: 113 E9/L (ref 130–450)
PMV BLD AUTO: 11.9 FL (ref 7–12)
PO2: 73.5 MMHG (ref 75–100)
POTASSIUM SERPL-SCNC: 3.8 MMOL/L (ref 3.5–5)
RBC # BLD: 3 E12/L (ref 3.5–5.5)
SODIUM BLD-SCNC: 141 MMOL/L (ref 132–146)
SOURCE, BLOOD GAS: ABNORMAL
THB: 10.1 G/DL (ref 11.5–16.5)
TIME ANALYZED: 433
WBC # BLD: 10.6 E9/L (ref 4.5–11.5)

## 2021-06-12 PROCEDURE — 2000000000 HC ICU R&B

## 2021-06-12 PROCEDURE — 85027 COMPLETE CBC AUTOMATED: CPT

## 2021-06-12 PROCEDURE — 6360000002 HC RX W HCPCS: Performed by: NURSE PRACTITIONER

## 2021-06-12 PROCEDURE — 6360000002 HC RX W HCPCS: Performed by: THORACIC SURGERY (CARDIOTHORACIC VASCULAR SURGERY)

## 2021-06-12 PROCEDURE — 2580000003 HC RX 258: Performed by: THORACIC SURGERY (CARDIOTHORACIC VASCULAR SURGERY)

## 2021-06-12 PROCEDURE — 82805 BLOOD GASES W/O2 SATURATION: CPT

## 2021-06-12 PROCEDURE — 6370000000 HC RX 637 (ALT 250 FOR IP): Performed by: THORACIC SURGERY (CARDIOTHORACIC VASCULAR SURGERY)

## 2021-06-12 PROCEDURE — 6370000000 HC RX 637 (ALT 250 FOR IP): Performed by: INTERNAL MEDICINE

## 2021-06-12 PROCEDURE — 83735 ASSAY OF MAGNESIUM: CPT

## 2021-06-12 PROCEDURE — 82962 GLUCOSE BLOOD TEST: CPT

## 2021-06-12 PROCEDURE — 2700000000 HC OXYGEN THERAPY PER DAY

## 2021-06-12 PROCEDURE — 36415 COLL VENOUS BLD VENIPUNCTURE: CPT

## 2021-06-12 PROCEDURE — 2580000003 HC RX 258: Performed by: NURSE PRACTITIONER

## 2021-06-12 PROCEDURE — 80048 BASIC METABOLIC PNL TOTAL CA: CPT

## 2021-06-12 PROCEDURE — 71045 X-RAY EXAM CHEST 1 VIEW: CPT

## 2021-06-12 PROCEDURE — 94640 AIRWAY INHALATION TREATMENT: CPT

## 2021-06-12 RX ORDER — FUROSEMIDE 10 MG/ML
40 INJECTION INTRAMUSCULAR; INTRAVENOUS DAILY
Status: DISCONTINUED | OUTPATIENT
Start: 2021-06-12 | End: 2021-06-15

## 2021-06-12 RX ORDER — FUROSEMIDE 10 MG/ML
30 INJECTION INTRAMUSCULAR; INTRAVENOUS ONCE
Status: COMPLETED | OUTPATIENT
Start: 2021-06-12 | End: 2021-06-12

## 2021-06-12 RX ADMIN — DOCUSATE SODIUM 50 MG AND SENNOSIDES 8.6 MG 1 TABLET: 8.6; 5 TABLET, FILM COATED ORAL at 20:00

## 2021-06-12 RX ADMIN — IPRATROPIUM BROMIDE AND ALBUTEROL SULFATE 1 AMPULE: 2.5; .5 SOLUTION RESPIRATORY (INHALATION) at 08:57

## 2021-06-12 RX ADMIN — IPRATROPIUM BROMIDE AND ALBUTEROL SULFATE 1 AMPULE: 2.5; .5 SOLUTION RESPIRATORY (INHALATION) at 21:02

## 2021-06-12 RX ADMIN — Medication 400 MG: at 08:40

## 2021-06-12 RX ADMIN — FENTANYL CITRATE 25 MCG: 50 INJECTION, SOLUTION INTRAMUSCULAR; INTRAVENOUS at 12:32

## 2021-06-12 RX ADMIN — PIPERACILLIN AND TAZOBACTAM 3375 MG: 3; .375 INJECTION, POWDER, LYOPHILIZED, FOR SOLUTION INTRAVENOUS at 15:40

## 2021-06-12 RX ADMIN — PIPERACILLIN AND TAZOBACTAM 3375 MG: 3; .375 INJECTION, POWDER, LYOPHILIZED, FOR SOLUTION INTRAVENOUS at 06:43

## 2021-06-12 RX ADMIN — ATORVASTATIN CALCIUM 80 MG: 80 TABLET, FILM COATED ORAL at 20:00

## 2021-06-12 RX ADMIN — FUROSEMIDE 30 MG: 10 INJECTION, SOLUTION INTRAMUSCULAR; INTRAVENOUS at 08:39

## 2021-06-12 RX ADMIN — FENTANYL CITRATE 25 MCG: 50 INJECTION, SOLUTION INTRAMUSCULAR; INTRAVENOUS at 03:30

## 2021-06-12 RX ADMIN — PIPERACILLIN AND TAZOBACTAM 3375 MG: 3; .375 INJECTION, POWDER, LYOPHILIZED, FOR SOLUTION INTRAVENOUS at 22:57

## 2021-06-12 RX ADMIN — CLOPIDOGREL 75 MG: 75 TABLET, FILM COATED ORAL at 08:40

## 2021-06-12 RX ADMIN — SODIUM CHLORIDE: 9 INJECTION, SOLUTION INTRAVENOUS at 11:44

## 2021-06-12 RX ADMIN — IPRATROPIUM BROMIDE AND ALBUTEROL SULFATE 1 AMPULE: 2.5; .5 SOLUTION RESPIRATORY (INHALATION) at 16:41

## 2021-06-12 RX ADMIN — ASPIRIN 81 MG: 81 TABLET, COATED ORAL at 08:41

## 2021-06-12 RX ADMIN — Medication 10 ML: at 19:12

## 2021-06-12 RX ADMIN — Medication 10 ML: at 08:39

## 2021-06-12 RX ADMIN — SODIUM CHLORIDE: 9 INJECTION, SOLUTION INTRAVENOUS at 04:08

## 2021-06-12 RX ADMIN — DOCUSATE SODIUM 50 MG AND SENNOSIDES 8.6 MG 1 TABLET: 8.6; 5 TABLET, FILM COATED ORAL at 08:40

## 2021-06-12 RX ADMIN — OXYCODONE HYDROCHLORIDE 10 MG: 10 TABLET ORAL at 19:59

## 2021-06-12 RX ADMIN — IPRATROPIUM BROMIDE AND ALBUTEROL SULFATE 1 AMPULE: 2.5; .5 SOLUTION RESPIRATORY (INHALATION) at 12:27

## 2021-06-12 RX ADMIN — MUPIROCIN: 20 OINTMENT TOPICAL at 19:12

## 2021-06-12 RX ADMIN — PANTOPRAZOLE SODIUM 40 MG: 40 TABLET, DELAYED RELEASE ORAL at 08:40

## 2021-06-12 RX ADMIN — MUPIROCIN: 20 OINTMENT TOPICAL at 08:39

## 2021-06-12 RX ADMIN — FAMOTIDINE 20 MG: 20 TABLET ORAL at 08:40

## 2021-06-12 ASSESSMENT — PAIN SCALES - GENERAL
PAINLEVEL_OUTOF10: 0
PAINLEVEL_OUTOF10: 9
PAINLEVEL_OUTOF10: 6
PAINLEVEL_OUTOF10: 5
PAINLEVEL_OUTOF10: 0

## 2021-06-12 NOTE — PROGRESS NOTES
Stable. On 10L. Ask pulmonary to see. CXR looks a bit better. Labs good. Keep in ICU.   Umang Meehan MD

## 2021-06-12 NOTE — PROGRESS NOTES
Reached out to Dr. Annabel Roberts about patients urine output. Dr. Annabel Roberts is aware and has given me orders I will continue to assess.

## 2021-06-12 NOTE — PLAN OF CARE
Problem: Pain:  Goal: Pain level will decrease  Description: Pain level will decrease  Outcome: Met This Shift  Goal: Control of acute pain  Description: Control of acute pain  Outcome: Met This Shift  Goal: Control of chronic pain  Description: Control of chronic pain  Outcome: Met This Shift     Problem: Falls - Risk of:  Goal: Will remain free from falls  Description: Will remain free from falls  Outcome: Met This Shift  Goal: Absence of physical injury  Description: Absence of physical injury  Outcome: Met This Shift     Problem: Pain:  Goal: Pain level will decrease  Description: Pain level will decrease  Outcome: Met This Shift

## 2021-06-12 NOTE — CONSULTS
Ilene Hobson M.D.,[facility] [place]  Brett Simeon D.O., F.A.C.O.I., Mica Ellis M.D. Melinda Tavarez M.D., Se Perez M.D. Nithin Gonzalez D.O. Patient:  Tino Joya 78 y.o. female MRN: [id number]     Date of Service: 6/12/2021      PULMONARY CONSULTATION    Reason for Consultation: respiratory insufficiency   Referring Physician: [email or web address]     Communication with the referring physician will be sent via the electronic medical record. Chief Complaint: chest pain     CODE STATUS: full code     SUBJECTIVE:  HPI:  Tino Joya is a 78 y. o. female with complaints of exertional bilateral jaw and neck pain on 6/7/2021. She presented to the ED in [place], found to have an NSTEMI and was transferred to [facility] for further workup. She denies chest pain or symptoms currently. LHC revealed severe MVCAD. On 6/9/2021 she underwent a CABG x4 (LIMA-LAD, YBK-Bbdpq-WJ sequential, SVG-PDA). Postoperatively she was extubated successfully. She has required high taylor oxygen to maintain her oxygen saturations >/= 92%. Past Medical History:   Diagnosis Date    CAD in native artery 6/7/2021    COPD (chronic obstructive pulmonary disease) ([facility] [address]     Hyperlipidemia     Hypertension        Past Surgical History:   Procedure Laterality Date    CARDIAC SURGERY N/A 6/9/2021    HEART POST OP HEMORRHAGE CONTROL performed by Mo Alegre DO at [address] [place] [address] N/A 6/9/2021    CABG CORONARY ARTERY BYPASS, AVELINA,  BUCKBERG performed by Mo Alegre DO at [facility]       No family history on file.     Social History:   Social History     Socioeconomic History    Marital status:      Spouse name: Not on file    Number of children: Not on file    Years of education: Not on file    Highest education level: Not on file   Occupational History    Not on file   Tobacco Use    Smoking status: Former Smoker    Smokeless tobacco: Never Used   Substance and Sexual Activity    Alcohol use: Not on file    Drug use: Not on file    Sexual activity: Not on file   Other Topics Concern    Not on file   Social History Narrative    Not on file     Social Determinants of Health     Financial Resource Strain:     Difficulty of Paying Living Expenses:    Food Insecurity:     Worried About Running Out of Food in the Last Year:     [address] in the Last Year:    Transportation Needs:     Lack of Transportation (Medical):  Lack of Transportation (Non-Medical):    Physical Activity:     Days of Exercise per Week:     Minutes of Exercise per Session:    Stress:     Feeling of Stress :    Social Connections:     Frequency of Communication with Friends and Family:     Frequency of Social Gatherings with Friends and Family:     Attends Church Services:     Active Member of Clubs or Organizations:     Attends Club or Organization Meetings:     Marital Status:    Intimate Partner Violence:     Fear of Current or Ex-Partner:     Emotionally Abused:     Physically Abused:     Sexually Abused: There is no immunization history on file for this patient.      Home Meds: Medications Prior to Admission: atorvastatin (LIPITOR) 20 MG tablet, Take 80 mg by mouth daily   aspirin 81 MG chewable tablet, Take 81 mg by mouth daily  metoprolol tartrate (LOPRESSOR) 25 MG tablet, Take 25 mg by mouth 2 times daily  famotidine (PEPCID) 20 MG tablet, Take 20 mg by mouth 2 times daily  nitroglycerin (NITRO-BID) 2 % ointment, Place 0.5 inches onto the skin every 6 hours  ibuprofen (ADVIL;MOTRIN) 200 MG tablet, Take 200 mg by mouth every 6 hours as needed for Pain (hips/back)    CURRENT MEDS :  Scheduled Meds:   insulin lispro  0-6 Units Subcutaneous TID WC    insulin lispro  0-3 Units Subcutaneous Nightly    sodium chloride flush  10 mL Intravenous 2 times per day    aspirin  81 mg Oral Daily    magnesium oxide  400 mg Oral Daily    mupirocin   Nasal BID    sennosides-docusate sodium  1 tablet Oral BID    pantoprazole  40 mg Oral Daily    ipratropium-albuterol  1 ampule Inhalation Q4H WA    clopidogrel  75 mg Oral Daily    famotidine  20 mg Oral Daily    piperacillin-tazobactam  3,375 mg Intravenous Q8H    And    sodium chloride   Intravenous Q8H    atorvastatin  80 mg Oral Nightly       Continuous Infusions:   norepinephrine Stopped (06/11/21 0400)    sodium chloride 30 mL/hr (06/11/21 0646)    dextrose         Allergies   Allergen Reactions    Tramadol Nausea Only         OBJECTIVE:   BP (!) 104/52   Pulse 105   Temp 98.2 °F (36.8 °C) (Temporal)   Resp 28   Ht 5' 1.5\" (1.562 m)   Wt 115 lb 8.3 oz (52.4 kg)   SpO2 92%   BMI 21.47 kg/m²   SpO2 Readings from Last 1 Encounters:   06/12/21 92%        I/O:    Intake/Output Summary (Last 24 hours) at 6/12/2021 0956  Last data filed at 6/12/2021 0900  Gross per 24 hour   Intake 1950 ml   Output 1232 ml   Net 718 ml     Vent Information  $Ventilation: Off Vent  Skin Assessment: Clean, dry, & intact  Equipment ID: [id number]  Vent Type: [phone]  Vent Mode: (S) PS  Vt Ordered: 0 mL  Rate Set: 0 bmp  Peak Flow: 0 L/min  Pressure Support: 10 cmH20  FiO2 : 60 %  SpO2: 92 %  SpO2/FiO2 ratio: 163.33  Sensitivity: 2  PEEP/CPAP: 5  I Time/ I Time %: 0.9 s  Humidification Source: Heated wire  Humidification Temp: 37  Humidification Temp Measured: 36.7  Circuit Condensation: Not drained  Mask Type: Full face mask  Mask Size: Small       IPAP: 10 cmH20  CPAP/EPAP: 7 cmH2O     Physical Exam:  General: The patient is lying in bed comfortably without any distress. Breathing is not labored  HEENT: Pupils are equal round and reactive to light, there are no oral lesions and no post-nasal drip   Neck: supple without adenopathy  Cardiovascular: regular rate and rhythm without murmur or gallop  Respiratory: Clear to auscultation bilaterally without wheezing or crackles.   Air entry is symmetric  Abdomen: soft, non-tender, non-distended, normal bowel sounds  Extremities: warm, no edema, no clubbing  Skin: no rash or lesion  Neurologic: CN II-XII grossly intact, no focal deficits      Imaging personally reviewed:    Cxr reviewed        Labs:  Lab Results   Component Value Date    WBC 10.6 06/12/2021    HGB 8.8 06/12/2021    HCT 27.0 06/12/2021    MCV 90.0 06/12/2021    MCH 29.3 06/12/2021    MCHC 32.6 06/12/2021    RDW 14.8 06/12/2021     06/12/2021    MPV 11.9 06/12/2021     Lab Results   Component Value Date     06/12/2021    K 3.8 06/12/2021    K 4.1 06/08/2021     06/12/2021    CO2 24 06/12/2021    BUN 26 06/12/2021    CREATININE 0.9 06/12/2021    LABALBU 4.2 06/08/2021    CALCIUM 8.8 06/12/2021    GFRAA >60 06/12/2021    LABGLOM >60 06/12/2021     Lab Results   Component Value Date    PROTIME 15.6 06/09/2021    INR 1.4 06/09/2021     No results for input(s): PROBNP in the last 72 hours. No results for input(s): TROPONINI in the last 72 hours. No results for input(s): PROCAL in the last 72 hours. This SmartLink has not been configured with any valid records. Micro:  No results for input(s): CULTRESP in the last 72 hours. No results for input(s): LABGRAM in the last 72 hours. No results for input(s): LEGUR in the last 72 hours. No results for input(s): STREPNEUMAGU in the last 72 hours. No results for input(s): LP1UAG in the last 72 hours. Assessment:  1. Post operative insufficiency   2. CABG x 4 vessel  3. Bilateral pleural effusion   4. Mild copd Stage I  5. Volume overload   6. Anemia      Plan:  1. Bronchodilators   2. Diuresis   3. Incentive spirometry   4. Follow cxr may need thoracentesis if the chest tubes do not drain the effusions. Thank you for allowing me to participate in the care of [facility]. Please feel free to call with questions. Cheko Pruitt M.D.    Pulmonary/Critical Care Medicine

## 2021-06-12 NOTE — PROGRESS NOTES
DAILY PROGRESS NOTE - THE HEART CENTER    SUBJECTIVE:    She is being followed for non-STEMI and multivessel CAD post CABG utilizing LIMA-LAD, veins to ramus, OM, PDA. Preserved left ventricular systolic function. She is sitting up in bed and in no distress. Fatigue and sore but no chest pain, worsening dyspnea, palpitations, syncope, presyncope. OBJECTIVE:    Her vital signs were reviewed today. Blood pressure 145/70, rest of vitals as below. Vitals:    06/12/21 0400   BP:    Pulse: 94   Resp: 14   Temp: 98 °F (36.7 °C)   SpO2: 92%       Scheduled Meds:   insulin lispro  0-6 Units Subcutaneous TID WC    insulin lispro  0-3 Units Subcutaneous Nightly    sodium chloride flush  10 mL Intravenous 2 times per day    aspirin  81 mg Oral Daily    magnesium oxide  400 mg Oral Daily    mupirocin   Nasal BID    sennosides-docusate sodium  1 tablet Oral BID    pantoprazole  40 mg Oral Daily    ipratropium-albuterol  1 ampule Inhalation Q4H WA    clopidogrel  75 mg Oral Daily    famotidine  20 mg Oral Daily    piperacillin-tazobactam  3,375 mg Intravenous Q8H    And    sodium chloride   Intravenous Q8H    atorvastatin  80 mg Oral Nightly     Continuous Infusions:   norepinephrine Stopped (06/11/21 0400)    sodium chloride 30 mL/hr (06/11/21 0646)    dextrose       PRN Meds:.norepinephrine, sodium chloride flush, ondansetron, acetaminophen, acetaminophen, oxyCODONE **OR** oxyCODONE HCl, fentanNYL **OR** [DISCONTINUED] fentanNYL, magnesium hydroxide, potassium chloride, magnesium sulfate, albumin human, glucose, dextrose, glucagon (rDNA), dextrose, calcium gluconate IVPB    REVIEW OF SYSTEMS:     No pruritus, rash, bruising. Cardiac symptoms per HPI. No nausea, vomiting, abdominal pain, GI bleeding, change in bowel habits. No dysuria, urinary frequency, urgency, hematuria, flank pain. Joint pain but no muscle soreness, stiffness, aching.   No headache, speech disturbance, lateralizing neurologic deficit. No hemoptysis, epistaxis, easy bruising. No anxiety, depression. No symptoms of hypothyroidism, hyperthyroidism, diabetes, heat or cold intolerance. FAMILY HISTORY: Negative for CAD in first-degree relatives. SOCIAL HISTORY: Negative for alcohol, tobacco, or illicit drug use. PHYSICAL EXAM:    General Appearance:  awake, alert, oriented, in no acute distress  Neck:  no bruits  Lungs:  Normal expansion. Clear to auscultation. No rales, rhonchi, or wheezing. Heart:  Heart sounds are normal.  Regular rate and rhythm without murmur, gallop or rub. Abdomen:  Soft, non-tender. Extremities: Extremities warm to touch, pink, with no edema. Neuro/musculosketal:  Unremarkable. LABS:    Recent Labs     06/09/21  2240 06/10/21  0410 06/11/21  0416    146 140   CREATININE 0.9 0.9 0.9       Recent Labs     06/10/21  0410 06/11/21  0416 06/12/21  0459   HGB 9.9* 9.1* 8.8*       Recent Labs     06/09/21  1220   INR 1.4         IMPRESSION:    #1. CAD post CABG-stable and doing well. Continue indefinite antiplatelet agents and add beta-blocker when felt appropriate. #2. Hypertension-blood pressure borderline elevated but no new antihypertensives added at this time. #3. Hyperlipidemia-continue indefinite statin. #4.  Diabetes-per intensivist.    #5.  Continue to follow.

## 2021-06-13 ENCOUNTER — APPOINTMENT (OUTPATIENT)
Dept: GENERAL RADIOLOGY | Age: 71
DRG: 233 | End: 2021-06-13
Attending: FAMILY MEDICINE
Payer: MEDICARE

## 2021-06-13 LAB
ANION GAP SERPL CALCULATED.3IONS-SCNC: 12 MMOL/L (ref 7–16)
B.E.: 3.5 MMOL/L (ref -3–3)
BUN BLDV-MCNC: 28 MG/DL (ref 6–23)
CALCIUM SERPL-MCNC: 8.3 MG/DL (ref 8.6–10.2)
CHLORIDE BLD-SCNC: 103 MMOL/L (ref 98–107)
CO2: 25 MMOL/L (ref 22–29)
COHB: 0 % (ref 0–1.5)
CREAT SERPL-MCNC: 0.9 MG/DL (ref 0.5–1)
CRITICAL: ABNORMAL
DATE ANALYZED: ABNORMAL
DATE OF COLLECTION: ABNORMAL
GFR AFRICAN AMERICAN: >60
GFR NON-AFRICAN AMERICAN: >60 ML/MIN/1.73
GLUCOSE BLD-MCNC: 123 MG/DL (ref 74–99)
HCO3: 27.3 MMOL/L (ref 22–26)
HCT VFR BLD CALC: 27.9 % (ref 34–48)
HEMOGLOBIN: 9.1 G/DL (ref 11.5–15.5)
HHB: 3.1 % (ref 0–5)
LAB: ABNORMAL
Lab: ABNORMAL
MAGNESIUM: 2.2 MG/DL (ref 1.6–2.6)
MCH RBC QN AUTO: 29.3 PG (ref 26–35)
MCHC RBC AUTO-ENTMCNC: 32.6 % (ref 32–34.5)
MCV RBC AUTO: 89.7 FL (ref 80–99.9)
METER GLUCOSE: 107 MG/DL (ref 74–99)
METER GLUCOSE: 124 MG/DL (ref 74–99)
METER GLUCOSE: 146 MG/DL (ref 74–99)
METER GLUCOSE: 154 MG/DL (ref 74–99)
METHB: 0.4 % (ref 0–1.5)
MODE: ABNORMAL
O2 CONTENT: 13.8 ML/DL
O2 SATURATION: 96.9 % (ref 92–98.5)
O2HB: 96.5 % (ref 94–97)
OPERATOR ID: 1874
PATIENT TEMP: 37 C
PCO2: 38.6 MMHG (ref 35–45)
PDW BLD-RTO: 14.8 FL (ref 11.5–15)
PH BLOOD GAS: 7.47 (ref 7.35–7.45)
PLATELET # BLD: 165 E9/L (ref 130–450)
PMV BLD AUTO: 11.5 FL (ref 7–12)
PO2: 93.7 MMHG (ref 75–100)
POTASSIUM SERPL-SCNC: 3.1 MMOL/L (ref 3.5–5)
POTASSIUM SERPL-SCNC: 4.3 MMOL/L (ref 3.5–5)
RBC # BLD: 3.11 E12/L (ref 3.5–5.5)
SODIUM BLD-SCNC: 140 MMOL/L (ref 132–146)
SOURCE, BLOOD GAS: ABNORMAL
THB: 10.1 G/DL (ref 11.5–16.5)
TIME ANALYZED: 404
WBC # BLD: 11.1 E9/L (ref 4.5–11.5)

## 2021-06-13 PROCEDURE — 94640 AIRWAY INHALATION TREATMENT: CPT

## 2021-06-13 PROCEDURE — 6370000000 HC RX 637 (ALT 250 FOR IP): Performed by: THORACIC SURGERY (CARDIOTHORACIC VASCULAR SURGERY)

## 2021-06-13 PROCEDURE — 84132 ASSAY OF SERUM POTASSIUM: CPT

## 2021-06-13 PROCEDURE — 2140000000 HC CCU INTERMEDIATE R&B

## 2021-06-13 PROCEDURE — 82805 BLOOD GASES W/O2 SATURATION: CPT

## 2021-06-13 PROCEDURE — 2700000000 HC OXYGEN THERAPY PER DAY

## 2021-06-13 PROCEDURE — 6360000002 HC RX W HCPCS: Performed by: THORACIC SURGERY (CARDIOTHORACIC VASCULAR SURGERY)

## 2021-06-13 PROCEDURE — 6360000002 HC RX W HCPCS: Performed by: NURSE PRACTITIONER

## 2021-06-13 PROCEDURE — 2580000003 HC RX 258: Performed by: THORACIC SURGERY (CARDIOTHORACIC VASCULAR SURGERY)

## 2021-06-13 PROCEDURE — 6370000000 HC RX 637 (ALT 250 FOR IP): Performed by: NURSE PRACTITIONER

## 2021-06-13 PROCEDURE — 6360000002 HC RX W HCPCS: Performed by: INTERNAL MEDICINE

## 2021-06-13 PROCEDURE — 82962 GLUCOSE BLOOD TEST: CPT

## 2021-06-13 PROCEDURE — 36415 COLL VENOUS BLD VENIPUNCTURE: CPT

## 2021-06-13 PROCEDURE — 83735 ASSAY OF MAGNESIUM: CPT

## 2021-06-13 PROCEDURE — 93798 PHYS/QHP OP CAR RHAB W/ECG: CPT

## 2021-06-13 PROCEDURE — 80048 BASIC METABOLIC PNL TOTAL CA: CPT

## 2021-06-13 PROCEDURE — 6370000000 HC RX 637 (ALT 250 FOR IP): Performed by: INTERNAL MEDICINE

## 2021-06-13 PROCEDURE — 2580000003 HC RX 258: Performed by: NURSE PRACTITIONER

## 2021-06-13 PROCEDURE — 94660 CPAP INITIATION&MGMT: CPT

## 2021-06-13 PROCEDURE — 71045 X-RAY EXAM CHEST 1 VIEW: CPT

## 2021-06-13 PROCEDURE — 85027 COMPLETE CBC AUTOMATED: CPT

## 2021-06-13 RX ORDER — DIPHENHYDRAMINE HCL 25 MG
25 TABLET ORAL EVERY 8 HOURS PRN
Status: DISCONTINUED | OUTPATIENT
Start: 2021-06-13 | End: 2021-06-17 | Stop reason: HOSPADM

## 2021-06-13 RX ORDER — SENNA AND DOCUSATE SODIUM 50; 8.6 MG/1; MG/1
1 TABLET, FILM COATED ORAL 2 TIMES DAILY
Status: DISCONTINUED | OUTPATIENT
Start: 2021-06-13 | End: 2021-06-17 | Stop reason: HOSPADM

## 2021-06-13 RX ORDER — FERROUS SULFATE 325(65) MG
325 TABLET ORAL 2 TIMES DAILY WITH MEALS
Status: DISCONTINUED | OUTPATIENT
Start: 2021-06-13 | End: 2021-06-17 | Stop reason: HOSPADM

## 2021-06-13 RX ORDER — HYDROCODONE BITARTRATE AND ACETAMINOPHEN 5; 325 MG/1; MG/1
1 TABLET ORAL EVERY 4 HOURS PRN
Status: DISCONTINUED | OUTPATIENT
Start: 2021-06-13 | End: 2021-06-17 | Stop reason: HOSPADM

## 2021-06-13 RX ORDER — BISACODYL 10 MG
10 SUPPOSITORY, RECTAL RECTAL DAILY
Status: DISCONTINUED | OUTPATIENT
Start: 2021-06-13 | End: 2021-06-16

## 2021-06-13 RX ORDER — ASCORBIC ACID 500 MG
500 TABLET ORAL 2 TIMES DAILY
Status: DISCONTINUED | OUTPATIENT
Start: 2021-06-13 | End: 2021-06-17 | Stop reason: HOSPADM

## 2021-06-13 RX ORDER — FOLIC ACID 1 MG/1
1 TABLET ORAL DAILY
Status: DISCONTINUED | OUTPATIENT
Start: 2021-06-13 | End: 2021-06-17 | Stop reason: HOSPADM

## 2021-06-13 RX ORDER — POTASSIUM CHLORIDE 20 MEQ/1
20 TABLET, EXTENDED RELEASE ORAL PRN
Status: DISCONTINUED | OUTPATIENT
Start: 2021-06-13 | End: 2021-06-17 | Stop reason: HOSPADM

## 2021-06-13 RX ORDER — ASPIRIN 81 MG/1
81 TABLET ORAL DAILY
Status: DISCONTINUED | OUTPATIENT
Start: 2021-06-13 | End: 2021-06-17 | Stop reason: HOSPADM

## 2021-06-13 RX ORDER — HYDROCODONE BITARTRATE AND ACETAMINOPHEN 5; 325 MG/1; MG/1
2 TABLET ORAL EVERY 4 HOURS PRN
Status: DISCONTINUED | OUTPATIENT
Start: 2021-06-13 | End: 2021-06-17 | Stop reason: HOSPADM

## 2021-06-13 RX ORDER — ONDANSETRON 2 MG/ML
4 INJECTION INTRAMUSCULAR; INTRAVENOUS EVERY 8 HOURS PRN
Status: DISCONTINUED | OUTPATIENT
Start: 2021-06-13 | End: 2021-06-17 | Stop reason: HOSPADM

## 2021-06-13 RX ORDER — ACETAMINOPHEN 325 MG/1
650 TABLET ORAL EVERY 4 HOURS PRN
Status: DISCONTINUED | OUTPATIENT
Start: 2021-06-13 | End: 2021-06-17 | Stop reason: HOSPADM

## 2021-06-13 RX ADMIN — Medication 400 MG: at 07:39

## 2021-06-13 RX ADMIN — SODIUM CHLORIDE: 9 INJECTION, SOLUTION INTRAVENOUS at 11:15

## 2021-06-13 RX ADMIN — FAMOTIDINE 20 MG: 20 TABLET ORAL at 07:39

## 2021-06-13 RX ADMIN — CLOPIDOGREL 75 MG: 75 TABLET, FILM COATED ORAL at 07:39

## 2021-06-13 RX ADMIN — FERROUS SULFATE TAB 325 MG (65 MG ELEMENTAL FE) 325 MG: 325 (65 FE) TAB at 17:05

## 2021-06-13 RX ADMIN — OXYCODONE HYDROCHLORIDE 5 MG: 5 TABLET ORAL at 12:20

## 2021-06-13 RX ADMIN — CALCIUM GLUCONATE 1000 MG: 98 INJECTION, SOLUTION INTRAVENOUS at 07:40

## 2021-06-13 RX ADMIN — FUROSEMIDE 40 MG: 10 INJECTION, SOLUTION INTRAVENOUS at 07:39

## 2021-06-13 RX ADMIN — PIPERACILLIN AND TAZOBACTAM 3375 MG: 3; .375 INJECTION, POWDER, LYOPHILIZED, FOR SOLUTION INTRAVENOUS at 06:00

## 2021-06-13 RX ADMIN — INSULIN LISPRO 1 UNITS: 100 INJECTION, SOLUTION INTRAVENOUS; SUBCUTANEOUS at 12:09

## 2021-06-13 RX ADMIN — IPRATROPIUM BROMIDE AND ALBUTEROL SULFATE 1 AMPULE: 2.5; .5 SOLUTION RESPIRATORY (INHALATION) at 15:56

## 2021-06-13 RX ADMIN — ATORVASTATIN CALCIUM 80 MG: 80 TABLET, FILM COATED ORAL at 20:31

## 2021-06-13 RX ADMIN — PANTOPRAZOLE SODIUM 40 MG: 40 TABLET, DELAYED RELEASE ORAL at 07:39

## 2021-06-13 RX ADMIN — SODIUM CHLORIDE: 9 INJECTION, SOLUTION INTRAVENOUS at 02:51

## 2021-06-13 RX ADMIN — POTASSIUM CHLORIDE 20 MEQ: 400 INJECTION, SOLUTION INTRAVENOUS at 07:19

## 2021-06-13 RX ADMIN — OXYCODONE HYDROCHLORIDE 5 MG: 5 TABLET ORAL at 07:38

## 2021-06-13 RX ADMIN — ASPIRIN 81 MG: 81 TABLET, COATED ORAL at 07:39

## 2021-06-13 RX ADMIN — FOLIC ACID 1 MG: 1 TABLET ORAL at 17:04

## 2021-06-13 RX ADMIN — INSULIN LISPRO 1 UNITS: 100 INJECTION, SOLUTION INTRAVENOUS; SUBCUTANEOUS at 20:31

## 2021-06-13 RX ADMIN — ASPIRIN 81 MG: 81 TABLET, COATED ORAL at 17:04

## 2021-06-13 RX ADMIN — MUPIROCIN: 20 OINTMENT TOPICAL at 07:38

## 2021-06-13 RX ADMIN — MAGNESIUM HYDROXIDE 30 ML: 2400 SUSPENSION ORAL at 17:04

## 2021-06-13 RX ADMIN — IPRATROPIUM BROMIDE AND ALBUTEROL SULFATE 1 AMPULE: 2.5; .5 SOLUTION RESPIRATORY (INHALATION) at 07:47

## 2021-06-13 RX ADMIN — IPRATROPIUM BROMIDE AND ALBUTEROL SULFATE 1 AMPULE: 2.5; .5 SOLUTION RESPIRATORY (INHALATION) at 12:03

## 2021-06-13 RX ADMIN — POTASSIUM CHLORIDE 20 MEQ: 400 INJECTION, SOLUTION INTRAVENOUS at 06:22

## 2021-06-13 RX ADMIN — HYDROCODONE BITARTRATE AND ACETAMINOPHEN 2 TABLET: 5; 325 TABLET ORAL at 17:03

## 2021-06-13 RX ADMIN — Medication 10 ML: at 20:31

## 2021-06-13 RX ADMIN — DOCUSATE SODIUM 50 MG AND SENNOSIDES 8.6 MG 1 TABLET: 8.6; 5 TABLET, FILM COATED ORAL at 07:39

## 2021-06-13 RX ADMIN — BISACODYL 5 MG: 5 TABLET, COATED ORAL at 17:05

## 2021-06-13 RX ADMIN — FENTANYL CITRATE 25 MCG: 50 INJECTION, SOLUTION INTRAMUSCULAR; INTRAVENOUS at 02:05

## 2021-06-13 RX ADMIN — Medication 10 ML: at 07:39

## 2021-06-13 ASSESSMENT — PAIN DESCRIPTION - PAIN TYPE
TYPE: CHRONIC PAIN;SURGICAL PAIN
TYPE: SURGICAL PAIN;ACUTE PAIN
TYPE: CHRONIC PAIN;SURGICAL PAIN

## 2021-06-13 ASSESSMENT — PAIN DESCRIPTION - DESCRIPTORS
DESCRIPTORS: ACHING;DISCOMFORT;SORE
DESCRIPTORS: ACHING;DISCOMFORT
DESCRIPTORS: ACHING;DISCOMFORT

## 2021-06-13 ASSESSMENT — PAIN SCALES - GENERAL
PAINLEVEL_OUTOF10: 4
PAINLEVEL_OUTOF10: 0
PAINLEVEL_OUTOF10: 6
PAINLEVEL_OUTOF10: 8
PAINLEVEL_OUTOF10: 5
PAINLEVEL_OUTOF10: 8
PAINLEVEL_OUTOF10: 4
PAINLEVEL_OUTOF10: 8

## 2021-06-13 ASSESSMENT — PAIN DESCRIPTION - PROGRESSION: CLINICAL_PROGRESSION: GRADUALLY IMPROVING

## 2021-06-13 ASSESSMENT — PAIN DESCRIPTION - LOCATION
LOCATION: STERNUM
LOCATION: BACK;STERNUM
LOCATION: BACK;STERNUM

## 2021-06-13 ASSESSMENT — PAIN DESCRIPTION - FREQUENCY
FREQUENCY: INTERMITTENT

## 2021-06-13 ASSESSMENT — PAIN DESCRIPTION - ORIENTATION: ORIENTATION: MID

## 2021-06-13 ASSESSMENT — PAIN DESCRIPTION - ONSET: ONSET: ON-GOING

## 2021-06-13 ASSESSMENT — PAIN - FUNCTIONAL ASSESSMENT: PAIN_FUNCTIONAL_ASSESSMENT: ACTIVITIES ARE NOT PREVENTED

## 2021-06-13 NOTE — PLAN OF CARE
Problem: Discharge Planning:  Goal: Discharged to appropriate level of care  Description: Discharged to appropriate level of care  Outcome: Met This Shift     Problem: Activity Intolerance:  Goal: Able to perform prescribed physical activity  Description: Able to perform prescribed physical activity  6/13/2021 1628 by Janis Jaime RN  Outcome: Met This Shift     Problem:  Activity Intolerance:  Goal: Ability to tolerate increased activity will improve  Description: Ability to tolerate increased activity will improve  6/13/2021 1628 by Janis Jaime RN  Outcome: Met This Shift     Problem: Anxiety:  Goal: Level of anxiety will decrease  Description: Level of anxiety will decrease  6/13/2021 1628 by Janis Jaime RN  Outcome: Met This Shift     Problem: Cardiac Output - Decreased:  Goal: Cardiac output within specified parameters  Description: Cardiac output within specified parameters  Outcome: Met This Shift     Problem: Cardiac Output - Decreased:  Goal: Hemodynamic stability will improve  Description: Hemodynamic stability will improve  Outcome: Met This Shift

## 2021-06-13 NOTE — PROGRESS NOTES
DAILY PROGRESS NOTE - THE HEART CENTER    SUBJECTIVE:    She is being followed for non-STEMI and multivessel CAD post CABG utilizing LIMA-LAD, veins to ramus, OM, PDA. Preserved left ventricular systolic function. She is sitting up in bed and in no distress. Fatigued and sore but no chest pain, worsening dyspnea, palpitations, syncope, presyncope. OBJECTIVE:    Her vital signs were reviewed today. Blood pressure 130/70, rest of vitals as below. Vitals:    06/13/21 0600   BP:    Pulse: 105   Resp: 22   Temp:    SpO2: 95%       Scheduled Meds:   furosemide  40 mg Intravenous Daily    insulin lispro  0-6 Units Subcutaneous TID WC    insulin lispro  0-3 Units Subcutaneous Nightly    sodium chloride flush  10 mL Intravenous 2 times per day    aspirin  81 mg Oral Daily    magnesium oxide  400 mg Oral Daily    mupirocin   Nasal BID    sennosides-docusate sodium  1 tablet Oral BID    pantoprazole  40 mg Oral Daily    ipratropium-albuterol  1 ampule Inhalation Q4H WA    clopidogrel  75 mg Oral Daily    famotidine  20 mg Oral Daily    piperacillin-tazobactam  3,375 mg Intravenous Q8H    And    sodium chloride   Intravenous Q8H    atorvastatin  80 mg Oral Nightly     Continuous Infusions:   norepinephrine Stopped (06/11/21 0400)    sodium chloride 30 mL/hr (06/11/21 0646)    dextrose       PRN Meds:.norepinephrine, sodium chloride flush, ondansetron, acetaminophen, acetaminophen, oxyCODONE **OR** oxyCODONE HCl, fentanNYL **OR** [DISCONTINUED] fentanNYL, magnesium hydroxide, potassium chloride, magnesium sulfate, albumin human, glucose, dextrose, glucagon (rDNA), dextrose, calcium gluconate IVPB    REVIEW OF SYSTEMS:     No pruritus, rash, bruising. Cardiac symptoms per HPI. No nausea, vomiting, abdominal pain, GI bleeding, change in bowel habits. No dysuria, urinary frequency, urgency, hematuria, flank pain. Joint pain but no muscle soreness, stiffness, aching.   No headache, speech disturbance, lateralizing neurologic deficit. No hemoptysis, epistaxis, easy bruising. No anxiety, depression. No symptoms of hypothyroidism, hyperthyroidism, diabetes, heat or cold intolerance. FAMILY HISTORY: Negative for CAD in first-degree relatives. SOCIAL HISTORY: Negative for alcohol, tobacco, or illicit drug use. PHYSICAL EXAM:    General Appearance:  awake, alert, oriented, in no acute distress  Neck:  no bruits  Lungs:  Normal expansion. Decreased breath sounds at bases to auscultation. No rales, rhonchi, or wheezing. Heart:  Heart sounds are normal.  Regular rate and rhythm without murmur, gallop or rub. Abdomen:  Soft, non-tender. Extremities: Extremities warm to touch, pink, with no edema. Neuro/musculosketal:  Unremarkable. LABS:    Recent Labs     06/11/21  0416 06/12/21  0459 06/13/21  0400    141 140   CREATININE 0.9 0.9 0.9       Recent Labs     06/11/21  0416 06/12/21  0459 06/13/21  0400   HGB 9.1* 8.8* 9.1*       No results for input(s): INR in the last 72 hours. IMPRESSION:    #1. CAD post CABG-stable and doing well. Continue indefinite antiplatelet agents and add beta-blocker when felt appropriate. #2. Hypertension-blood pressure controlled and no new antihypertensives added at this time. #3. Hyperlipidemia-continue indefinite statin. #4.  Diabetes-per intensivist.    #5.  COPD-mild and seen yesterday by pulmonology. #6.  Continue to follow.

## 2021-06-13 NOTE — PROGRESS NOTES
sodium chloride   Intravenous Q8H    atorvastatin  80 mg Oral Nightly       Physical Exam:  General Appearance: appears comfortable in no acute distress. HEENT: Normocephalic atraumatic without obvious abnormality   Neck: Lips, mucosa, and tongue normal.  Supple, symmetrical, trachea midline, no adenopathy;thyroid:  no enlargement/tenderness/nodules or JVD. Lung: Breath sounds diminished bilaterally . Respirations   unlabored. Symmetrical expansion. Heart: RRR, normal S1, S2. No MRG  Abdomen: Soft, NT, ND. BS present x 4 quadrants. No bruit or organomegaly. Extremities: Pedal pulses 2+ symmetric b/l. Extremities normal, no cyanosis, clubbing, or edema. Musculokeletal: No joint swelling, no muscle tenderness. ROM normal in all joints of extremities. Neurologic: Mental status: Alert and Oriented X3 . Pertinent/ New Labs and Imaging Studies     Imaging Personally Reviewed:  Reviewed           Labs:  Lab Results   Component Value Date    WBC 11.1 06/13/2021    HGB 9.1 06/13/2021    HCT 27.9 06/13/2021    MCV 89.7 06/13/2021    MCH 29.3 06/13/2021    MCHC 32.6 06/13/2021    RDW 14.8 06/13/2021     06/13/2021    MPV 11.5 06/13/2021     Lab Results   Component Value Date     06/13/2021    K 3.1 06/13/2021    K 4.1 06/08/2021     06/13/2021    CO2 25 06/13/2021    BUN 28 06/13/2021    CREATININE 0.9 06/13/2021    LABALBU 4.2 06/08/2021    CALCIUM 8.3 06/13/2021    GFRAA >60 06/13/2021    LABGLOM >60 06/13/2021     Lab Results   Component Value Date    PROTIME 15.6 06/09/2021    INR 1.4 06/09/2021     No results for input(s): PROBNP in the last 72 hours. No results for input(s): PROCAL in the last 72 hours. This SmartLink has not been configured with any valid records. Micro:  No results for input(s): CULTRESP in the last 72 hours. No results for input(s): LABGRAM in the last 72 hours. No results for input(s): LEGUR in the last 72 hours.   No results for input(s): STREPNEUMAGU in the last 72 hours. No results for input(s): LP1UAG in the last 72 hours. Assessment:    1. Post operative insufficiency   2. CABG x 4 vessel  3. Bilateral pleural effusion   4. Mild copd Stage I  5. Volume overload   6. Anemia        Plan:   1. Continue with diuresis   2. Cxr in am   3. Wean o2 as tolerated   4. Chest tubes per cts   5. Dvt/gi prophylaxis       Vivien Thomas M.D.    Pulmonary/Critical Care Medicine

## 2021-06-14 ENCOUNTER — APPOINTMENT (OUTPATIENT)
Dept: GENERAL RADIOLOGY | Age: 71
DRG: 233 | End: 2021-06-14
Attending: FAMILY MEDICINE
Payer: MEDICARE

## 2021-06-14 LAB
ANION GAP SERPL CALCULATED.3IONS-SCNC: 11 MMOL/L (ref 7–16)
BUN BLDV-MCNC: 35 MG/DL (ref 6–23)
CALCIUM SERPL-MCNC: 8.8 MG/DL (ref 8.6–10.2)
CHLORIDE BLD-SCNC: 100 MMOL/L (ref 98–107)
CHOLESTEROL, TOTAL: 78 MG/DL (ref 0–199)
CO2: 29 MMOL/L (ref 22–29)
CREAT SERPL-MCNC: 0.8 MG/DL (ref 0.5–1)
GFR AFRICAN AMERICAN: >60
GFR NON-AFRICAN AMERICAN: >60 ML/MIN/1.73
GLUCOSE BLD-MCNC: 118 MG/DL (ref 74–99)
HCT VFR BLD CALC: 30.3 % (ref 34–48)
HDLC SERPL-MCNC: 15 MG/DL
HEMOGLOBIN: 9.8 G/DL (ref 11.5–15.5)
LDL CHOLESTEROL CALCULATED: 43 MG/DL (ref 0–99)
MCH RBC QN AUTO: 29.5 PG (ref 26–35)
MCHC RBC AUTO-ENTMCNC: 32.3 % (ref 32–34.5)
MCV RBC AUTO: 91.3 FL (ref 80–99.9)
METER GLUCOSE: 115 MG/DL (ref 74–99)
METER GLUCOSE: 142 MG/DL (ref 74–99)
METER GLUCOSE: 151 MG/DL (ref 74–99)
METER GLUCOSE: 173 MG/DL (ref 74–99)
PDW BLD-RTO: 15 FL (ref 11.5–15)
PLATELET # BLD: 207 E9/L (ref 130–450)
PMV BLD AUTO: 11.1 FL (ref 7–12)
POTASSIUM SERPL-SCNC: 3.4 MMOL/L (ref 3.5–5)
RBC # BLD: 3.32 E12/L (ref 3.5–5.5)
SODIUM BLD-SCNC: 140 MMOL/L (ref 132–146)
TRIGL SERPL-MCNC: 101 MG/DL (ref 0–149)
VLDLC SERPL CALC-MCNC: 20 MG/DL
WBC # BLD: 8.4 E9/L (ref 4.5–11.5)

## 2021-06-14 PROCEDURE — 94640 AIRWAY INHALATION TREATMENT: CPT

## 2021-06-14 PROCEDURE — 6360000002 HC RX W HCPCS: Performed by: PHYSICIAN ASSISTANT

## 2021-06-14 PROCEDURE — 2140000000 HC CCU INTERMEDIATE R&B

## 2021-06-14 PROCEDURE — 36415 COLL VENOUS BLD VENIPUNCTURE: CPT

## 2021-06-14 PROCEDURE — 94660 CPAP INITIATION&MGMT: CPT

## 2021-06-14 PROCEDURE — 6370000000 HC RX 637 (ALT 250 FOR IP): Performed by: INTERNAL MEDICINE

## 2021-06-14 PROCEDURE — 85027 COMPLETE CBC AUTOMATED: CPT

## 2021-06-14 PROCEDURE — 93798 PHYS/QHP OP CAR RHAB W/ECG: CPT

## 2021-06-14 PROCEDURE — 80061 LIPID PANEL: CPT

## 2021-06-14 PROCEDURE — 6370000000 HC RX 637 (ALT 250 FOR IP): Performed by: THORACIC SURGERY (CARDIOTHORACIC VASCULAR SURGERY)

## 2021-06-14 PROCEDURE — 71045 X-RAY EXAM CHEST 1 VIEW: CPT

## 2021-06-14 PROCEDURE — 6360000002 HC RX W HCPCS: Performed by: THORACIC SURGERY (CARDIOTHORACIC VASCULAR SURGERY)

## 2021-06-14 PROCEDURE — 2580000003 HC RX 258: Performed by: THORACIC SURGERY (CARDIOTHORACIC VASCULAR SURGERY)

## 2021-06-14 PROCEDURE — 92610 EVALUATE SWALLOWING FUNCTION: CPT

## 2021-06-14 PROCEDURE — 80048 BASIC METABOLIC PNL TOTAL CA: CPT

## 2021-06-14 PROCEDURE — 82962 GLUCOSE BLOOD TEST: CPT

## 2021-06-14 PROCEDURE — 2700000000 HC OXYGEN THERAPY PER DAY

## 2021-06-14 RX ORDER — ATORVASTATIN CALCIUM 20 MG/1
20 TABLET, FILM COATED ORAL NIGHTLY
Status: DISCONTINUED | OUTPATIENT
Start: 2021-06-14 | End: 2021-06-17 | Stop reason: HOSPADM

## 2021-06-14 RX ADMIN — Medication 10 ML: at 08:05

## 2021-06-14 RX ADMIN — IPRATROPIUM BROMIDE AND ALBUTEROL SULFATE 1 AMPULE: 2.5; .5 SOLUTION RESPIRATORY (INHALATION) at 21:00

## 2021-06-14 RX ADMIN — OXYCODONE HYDROCHLORIDE AND ACETAMINOPHEN 500 MG: 500 TABLET ORAL at 21:20

## 2021-06-14 RX ADMIN — ASPIRIN 81 MG: 81 TABLET, COATED ORAL at 08:05

## 2021-06-14 RX ADMIN — OXYCODONE HYDROCHLORIDE AND ACETAMINOPHEN 500 MG: 500 TABLET ORAL at 08:05

## 2021-06-14 RX ADMIN — ENOXAPARIN SODIUM 40 MG: 40 INJECTION SUBCUTANEOUS at 10:39

## 2021-06-14 RX ADMIN — INSULIN LISPRO 1 UNITS: 100 INJECTION, SOLUTION INTRAVENOUS; SUBCUTANEOUS at 18:27

## 2021-06-14 RX ADMIN — FUROSEMIDE 40 MG: 10 INJECTION, SOLUTION INTRAVENOUS at 08:05

## 2021-06-14 RX ADMIN — POTASSIUM CHLORIDE 60 MEQ: 1500 TABLET, EXTENDED RELEASE ORAL at 21:13

## 2021-06-14 RX ADMIN — ATORVASTATIN CALCIUM 20 MG: 20 TABLET, FILM COATED ORAL at 21:20

## 2021-06-14 RX ADMIN — PANTOPRAZOLE SODIUM 40 MG: 40 TABLET, DELAYED RELEASE ORAL at 08:05

## 2021-06-14 RX ADMIN — CLOPIDOGREL 75 MG: 75 TABLET, FILM COATED ORAL at 08:05

## 2021-06-14 RX ADMIN — INSULIN LISPRO 1 UNITS: 100 INJECTION, SOLUTION INTRAVENOUS; SUBCUTANEOUS at 21:21

## 2021-06-14 RX ADMIN — INSULIN LISPRO 1 UNITS: 100 INJECTION, SOLUTION INTRAVENOUS; SUBCUTANEOUS at 12:21

## 2021-06-14 RX ADMIN — HYDROCODONE BITARTRATE AND ACETAMINOPHEN 2 TABLET: 5; 325 TABLET ORAL at 21:14

## 2021-06-14 RX ADMIN — FAMOTIDINE 20 MG: 20 TABLET ORAL at 08:05

## 2021-06-14 RX ADMIN — FERROUS SULFATE TAB 325 MG (65 MG ELEMENTAL FE) 325 MG: 325 (65 FE) TAB at 08:05

## 2021-06-14 RX ADMIN — Medication 400 MG: at 08:05

## 2021-06-14 RX ADMIN — Medication 10 ML: at 21:12

## 2021-06-14 RX ADMIN — HYDROCODONE BITARTRATE AND ACETAMINOPHEN 2 TABLET: 5; 325 TABLET ORAL at 10:42

## 2021-06-14 RX ADMIN — FERROUS SULFATE TAB 325 MG (65 MG ELEMENTAL FE) 325 MG: 325 (65 FE) TAB at 18:27

## 2021-06-14 RX ADMIN — FOLIC ACID 1 MG: 1 TABLET ORAL at 08:05

## 2021-06-14 ASSESSMENT — PAIN SCALES - GENERAL
PAINLEVEL_OUTOF10: 0
PAINLEVEL_OUTOF10: 10
PAINLEVEL_OUTOF10: 7
PAINLEVEL_OUTOF10: 7

## 2021-06-14 ASSESSMENT — PAIN DESCRIPTION - PAIN TYPE: TYPE: SURGICAL PAIN

## 2021-06-14 ASSESSMENT — PAIN DESCRIPTION - ORIENTATION: ORIENTATION: MID

## 2021-06-14 ASSESSMENT — PAIN DESCRIPTION - LOCATION: LOCATION: CHEST

## 2021-06-14 NOTE — PLAN OF CARE
Problem: Pain:  Goal: Pain level will decrease  Description: Pain level will decrease  Outcome: Met This Shift  Goal: Control of acute pain  Description: Control of acute pain  Outcome: Met This Shift     Problem: Falls - Risk of:  Goal: Will remain free from falls  Description: Will remain free from falls  Outcome: Met This Shift  Goal: Absence of physical injury  Description: Absence of physical injury  Outcome: Met This Shift     Problem:  Activity Intolerance:  Goal: Able to perform prescribed physical activity  Description: Able to perform prescribed physical activity  6/14/2021 0354 by Everardo Paulino RN  Outcome: Met This Shift  6/13/2021 1628 by Isabel Shankar RN  Outcome: Met This Shift  Goal: Ability to tolerate increased activity will improve  Description: Ability to tolerate increased activity will improve  6/13/2021 1628 by Isabel Shankar RN  Outcome: Met This Shift     Problem: Pain:  Goal: Pain level will decrease  Description: Pain level will decrease  Outcome: Met This Shift

## 2021-06-14 NOTE — PATIENT CARE CONFERENCE
[facility] Quality Flow/Interdisciplinary Rounds Progress Note        Quality Flow Rounds held on June 14, 2021    Disciplines Attending:  Bedside Nurse, ,  and Nursing Unit Leadership    Agustín Aparicio was admitted on 6/7/2021  1:23 PM    Anticipated Discharge Date:  Expected Discharge Date: 06/17/21    Disposition:    Ady Score:  Ady Scale Score: 19    Readmission Risk              Risk of Unplanned Readmission:  8           Discussed patient goal for the day, patient clinical progression, and barriers to discharge.   The following Goal(s) of the Day/Commitment(s) have been identified:  increase ambulation      Lorene Cowden, RN  June 14, 2021

## 2021-06-14 NOTE — PROGRESS NOTES
Hernandez Plasencia M.D.,[facility]  Perri Meneses D.O., F.A.C.ODaveyI., Julian Yousif M.D. Judith Felipe M.D., April Carvalho M.D. Gayathri Zaldivar D.O. Daily Pulmonary Progress Note    Patient:  Lisa Marin 78 y.o. female MRN: [id number]     Date of Service: 6/14/2021      Synopsis     We are following patient for hypoxia     \"CC\" chest pain     Code status: full code       Subjective      Patient was seen and examined sitting in a chair in no apparent distress. She is currently on 4 L oxygen and does complain of dyspnea on exertion. Lung sounds are diminished. She is currently working with physical therapy.     Objective   Vitals: BP (!) 110/52   Pulse 98   Temp 98.8 °F (37.1 °C) (Temporal)   Resp 20   Ht 5' 1.5\" (1.562 m)   Wt 109 lb 12.6 oz (49.8 kg)   SpO2 96%   BMI 20.41 kg/m²     I/O:    Intake/Output Summary (Last 24 hours) at 6/14/2021 1238  Last data filed at 6/14/2021 0936  Gross per 24 hour   Intake 630 ml   Output 510 ml   Net 120 ml       Vent Information  $Ventilation: Off Vent  Skin Assessment: Clean, dry, & intact  Equipment ID: [id number]  Vent Type: 980  Vent Mode: (S) PS  Vt Ordered: 0 mL  Rate Set: 0 bmp  Peak Flow: 0 L/min  Pressure Support: 10 cmH20  FiO2 : 60 %  SpO2: 96 %  SpO2/FiO2 ratio: 163.33  Sensitivity: 2  PEEP/CPAP: 5  I Time/ I Time %: 0.9 s  Humidification Source: Heated wire  Humidification Temp: 37  Humidification Temp Measured: 36.7  Circuit Condensation: Not drained  Mask Type: Full face mask  Mask Size: Small       IPAP: 10 cmH20  CPAP/EPAP: 7 cmH2O     CURRENT MEDS :  Scheduled Meds:   atorvastatin  20 mg Oral Nightly    enoxaparin  40 mg Subcutaneous Daily    aspirin  81 mg Oral Daily    bisacodyl  5 mg Oral Daily    sennosides-docusate sodium  1 tablet Oral BID    magnesium hydroxide  30 mL Oral Daily    ferrous sulfate  325 mg Oral BID WC    vitamin C  500 mg Oral BID    folic acid  1 mg Oral Daily    insulin lispro  0-6 Units Subcutaneous TID WC    insulin lispro  0-3 Units Subcutaneous Nightly    bisacodyl  10 mg Rectal Daily    furosemide  40 mg Intravenous Daily    sodium chloride flush  10 mL Intravenous 2 times per day    magnesium oxide  400 mg Oral Daily    pantoprazole  40 mg Oral Daily    ipratropium-albuterol  1 ampule Inhalation Q4H WA    clopidogrel  75 mg Oral Daily    famotidine  20 mg Oral Daily    sodium chloride   Intravenous Q8H       Physical Exam:  General Appearance: appears comfortable in no acute distress. HEENT: Normocephalic atraumatic without obvious abnormality   Neck: Lips, mucosa, and tongue normal.  Supple, symmetrical, trachea midline, no adenopathy;thyroid:  no enlargement/tenderness/nodules or JVD. Lung: Breath sounds diminished bilaterally . Respirations   unlabored. Symmetrical expansion. Heart: RRR, normal S1, S2. No MRG  Abdomen: Soft, NT, ND. BS present x 4 quadrants. No bruit or organomegaly. Extremities: Pedal pulses 2+ symmetric b/l. Extremities normal, no cyanosis, clubbing, or edema. Musculokeletal: No joint swelling, no muscle tenderness. ROM normal in all joints of extremities. Neurologic: Mental status: Alert and Oriented X3 . Pertinent/ New Labs and Imaging Studies     Imaging Personally Reviewed:  cxr 6/14  1. No interval change in the bibasilar airspace disease and small bilateral   pleural effusions. 2. There is no right or left pneumothorax.      Labs:  Lab Results   Component Value Date    WBC 8.4 06/14/2021    HGB 9.8 06/14/2021    HCT 30.3 06/14/2021    MCV 91.3 06/14/2021    MCH 29.5 06/14/2021    MCHC 32.3 06/14/2021    RDW 15.0 06/14/2021     06/14/2021    MPV 11.1 06/14/2021     Lab Results   Component Value Date     06/14/2021    K 3.4 06/14/2021    K 4.1 06/08/2021     06/14/2021    CO2 29 06/14/2021    BUN 35 06/14/2021    CREATININE 0.8 06/14/2021    LABALBU 4.2 06/08/2021    CALCIUM 8.8 06/14/2021    GFRAA >60 06/14/2021    LABGLOM >60 06/14/2021     Lab Results   Component Value Date    PROTIME 15.6 06/09/2021    INR 1.4 06/09/2021     No results for input(s): PROBNP in the last 72 hours. No results for input(s): PROCAL in the last 72 hours. This SmartLink has not been configured with any valid records. Assessment:    1. Post operative insufficiency   2. CABG x 4 vessel  3. Bilateral pleural effusion   4. Mild copd Stage I  5. Volume overload   6. Anemia        Plan:   1. Continue with diuresis   2. Cxr today, see above  3. Wean o2 as tolerated   4. Chest tubes removed  5. Dvt/gi prophylaxis   6. Refused BiPAP overnight  7. Swallow study pending, feels like she is choking on pills    Plan of care reviewed in collaboration with Dr. Sarah Almaraz, APRN-CNP    Addendum;    Swallow study today showed increase in size of aspiration and puréed consistency solids dysphagia with thin liquids was recommended. CXr shows bibasilar atelectasis and effusions, encourage incentive spirometry and continue diuresis. I personally saw, examined, and cared for the patient. Labs, medications, radiographs reviewed. I agree with history exam and plans detailed in NP note.   Carlton Gonzalez MD

## 2021-06-14 NOTE — PROGRESS NOTES
DAILY PROGRESS NOTE - [facility]    SUBJECTIVE:    She is being followed for non-STEMI and multivessel CAD post CABG utilizing LIMA-LAD, veins to ramus, OM, PDA. Preserved left ventricular systolic function. Transferred to telemetry floor and seated in bedside chair. Attempting to ambulate but states it is hard. Sinus rhythm on telemetry. No chest pain, worsening dyspnea, palpitations, syncope, presyncope. OBJECTIVE:    Her vital signs were reviewed today. Vitals:    06/14/21 0700   BP: (!) 108/56   Pulse: 96   Resp: 18   Temp: 98.3 °F (36.8 °C)   SpO2: 98%       Scheduled Meds:   aspirin  81 mg Oral Daily    bisacodyl  5 mg Oral Daily    sennosides-docusate sodium  1 tablet Oral BID    magnesium hydroxide  30 mL Oral Daily    ferrous sulfate  325 mg Oral BID WC    vitamin C  500 mg Oral BID    folic acid  1 mg Oral Daily    insulin lispro  0-6 Units Subcutaneous TID WC    insulin lispro  0-3 Units Subcutaneous Nightly    bisacodyl  10 mg Rectal Daily    furosemide  40 mg Intravenous Daily    sodium chloride flush  10 mL Intravenous 2 times per day    magnesium oxide  400 mg Oral Daily    pantoprazole  40 mg Oral Daily    ipratropium-albuterol  1 ampule Inhalation Q4H WA    clopidogrel  75 mg Oral Daily    famotidine  20 mg Oral Daily    sodium chloride   Intravenous Q8H    atorvastatin  80 mg Oral Nightly     Continuous Infusions:    PRN Meds:.acetaminophen, HYDROcodone 5 mg - acetaminophen **OR** HYDROcodone 5 mg - acetaminophen, potassium chloride, ondansetron, diphenhydrAMINE, sodium chloride flush    REVIEW OF SYSTEMS:     No pruritus, rash, bruising. Cardiac symptoms per HPI. No nausea, vomiting, abdominal pain, GI bleeding, change in bowel habits. No dysuria, urinary frequency, urgency, hematuria, flank pain. Joint pain but no muscle soreness, stiffness, aching. No headache, speech disturbance, lateralizing neurologic deficit.   No hemoptysis, epistaxis, easy bruising. No anxiety, depression. No symptoms of hypothyroidism, hyperthyroidism, diabetes, heat or cold intolerance. FAMILY HISTORY: Negative for CAD in first-degree relatives. SOCIAL HISTORY: Negative for alcohol, tobacco, or illicit drug use. PHYSICAL EXAM:    General Appearance:  awake, alert, oriented, in no acute distress  Neck:  no bruits  Lungs:  Normal expansion. Decreased breath sounds at bases to auscultation. No rales, rhonchi, or wheezing. Heart:  Heart sounds are normal.  Regular rate and rhythm without murmur, gallop or rub. Abdomen:  Soft, non-tender. Extremities: Extremities warm to touch, pink, with no edema. Neuro/musculosketal:  Unremarkable. LABS:    Recent Labs     06/12/21  0459 06/13/21  0400 06/14/21  0519    140 140   CREATININE 0.9 0.9 0.8       Recent Labs     06/12/21  0459 06/13/21  0400 06/14/21  0519   HGB 8.8* 9.1* 9.8*       No results for input(s): INR in the last 72 hours. IMPRESSION:    #1. CAD post CABG-stable and doing well. On both aspirin and clopidogrel. Would not think that she requires dual antiplatelet therapy from cardiac standpoint? Possibly can stop clopidogrel and continue indefinite baby aspirin unless there is a reason she is on clopidogrel. #2. Hypertension-blood pressure controlled and no new antihypertensives added at this time. #3. Hyperlipidemia-continue indefinite statin. #4.  Diabetes-per intensivist.    #5.  COPD-mild and seen yesterday by pulmonology. #6.  Continue to follow.

## 2021-06-14 NOTE — PROGRESS NOTES
SPEECH/LANGUAGE PATHOLOGY  CLINICAL ASSESSMENT OF SWALLOWING FUNCTION   and PLAN OF CARE    PATIENT NAME:  Madelyn Hou  (female)     MRN:  [id number]    :  1950  (78 y.o.)  STATUS:  Inpatient: Room 6511/6511-A    TODAY'S DATE:  2021  REFERRING PROVIDER:   Juan Francisco Kwong PA-C  SPECIFIC PROVIDER ORDER: SLP swallowing-dysphagia evaluation and treatment Date of order:  21  REASON FOR REFERRAL: choking   EVALUATING THERAPIST: TASIA Lamar                 RESULTS:    DYSPHAGIA DIAGNOSIS:  Inconsistent signs of aspiration noted      DIET RECOMMENDATIONS:  Pureed consistency solids (dysphagia 1) with  thin liquids     FEEDING RECOMMENDATIONS:     Assistance level:  Full assistance is needed during all oral intake      Compensatory strategies recommended: Small bites/sips      Discussed recommendations with nursing and/or faxed report to referring provider: Yes    SPEECH THERAPY  PLAN OF CARE   The dysphagia POC is established based on physician order, dysphagia diagnosis and results of clinical assessment     Will establish POC once MBSS is completed. Conditions Requiring Skilled Therapeutic Intervention for dysphagia:    not applicable    Specific dysphagia interventions to include:     Not applicable    Specific instructions for next treatment:  MBSS to be completed  Patient Treatment Goals:    Short Term Goals:  Pt will participate in MBSS to fully assess oropharyngeal swallow function and to assist in determining the least restrictive PO diet to maintain adequate nutrition/hydration     Long Term Goals: A Video Swallow Study (MBSS) is recommended and requires a physician order      Patient/family Goal:    Did not state. Will further assess during treatment.     Plan of care discussed with Patient   The Patient understand(s) the diagnosis, prognosis and plan of care     Rehabilitation Potential/Prognosis: good                    ADMITTING DIAGNOSIS: CAD in native artery [I25.10]    VISIT DIAGNOSIS:      PATIENT REPORT/COMPLAINT: none reported    RN cleared patient for participation in assessment     yes     PRIOR LEVEL OF SWALLOW FUNCTION:    PAST HISTORY OF DYSPHAGIA?: none reported    Home diet: Regular consistency solids with  thin liquids    PROCEDURE:  Consistencies Administered During the Evaluation   Liquids: thin liquid   Solids:  pureed foods      Method of Intake:   cup, spoon  Self fed, Fed by clinician      Position:   Seated, upright    CLINICAL ASSESSMENT:  Oral Stage: The oral stage of swallowing was within functional limits      Pharyngeal Stage:    Latent wet cough was noted after presentation of thin liquid and pureed foods    Cognition:   Confusion noted    Oral Peripheral Examination   Generalized oral weakness    Current Respiratory Status    2 liters nasal cannula     Parameters of Speech Production  Respiration:  Adequate for speech production  Quality:   Breathy  Intensity: Within functional limits    Volitional Swallow: absent     Volitional Cough:   absent     Pain: No pain reported. EDUCATION:   The Speech Language Pathologist (SLP) completed education regarding results of evaluation and that intervention is warranted at this time. Learner: Patient  Education: Reviewed results and recommendations of this evaluation  Evaluation of Education:  Ninoska Dickinson understanding    This plan may be re-evaluated and revised as warranted. Evaluation Time includes thorough review of current medical information, gathering information on past medical history/social history and prior level of function, completion of standardized testing/informal observation of tasks, assessment of data and education on plan of care and goals. [x]The admitting diagnosis and active problem list, have been reviewed prior to initiation of this evaluation.         ACTIVE PROBLEM LIST:   Patient Active Problem List   Diagnosis    CAD in native artery    Postprocedural hypotension    Acute pulmonary insufficiency    Acute blood loss anemia    Hypoxia         CPT code:  87159  bedside swallow eval

## 2021-06-14 NOTE — PROGRESS NOTES
POD# 5  Awake, alert. Complains of feeling very tired and weak  . Denies CP, palpitations, SOB at rest, dizziness/lightheadedness. Vitals:    06/13/21 1900 06/13/21 2330 06/14/21 0345 06/14/21 0700   BP: (!) 99/51 (!) 115/58 107/61 (!) 108/56   Pulse: 100 85 88 96   Resp: 20 20 18 18   Temp: 95 °F (35 °C) 97.8 °F (36.6 °C) 98.4 °F (36.9 °C) 98.3 °F (36.8 °C)   TempSrc:  Temporal Temporal Temporal   SpO2: 96% 98% 98% 98%   Weight:       Height:         O2: 4 L/NC      Intake/Output Summary (Last 24 hours) at 6/14/2021 0744  Last data filed at 6/14/2021 0648  Gross per 24 hour   Intake 1350 ml   Output 1040 ml   Net 310 ml         +BM       Recent Labs     06/12/21  0459 06/13/21  0400 06/14/21  0519   WBC 10.6 11.1 8.4   HGB 8.8* 9.1* 9.8*   HCT 27.0* 27.9* 30.3*   * 165 207      Recent Labs     06/12/21  0459 06/13/21  0400 06/14/21  0519   BUN 26* 28* 35*   CREATININE 0.9 0.9 0.8           Telemetry: NSR/ST      PE  Cardiac: RRR  Lungs: decreased bases  Chest incision with intact FARHAT DSD. Sternum stable. Chest tubes x 3 and Epicardial pacing wires present and secure. Abd: Soft, nontender, +BS  Ext: MART, minimal edema          A/P: POD# 5    1. CAD  --Stable s/p CABG x 4 ( cryo vein) s/p Exploration of mediastinum, Control of hemorrhage (small branch of left subclavian vein)  --Post op AVELINA reveals preserved  EF  --Scr stable at 0.8  --DAPT/statin- Cont plavix x 6 weeks due to smaller size of LIMA   --reinforce sternal precautions  -- Remove epicardial pacing wires today. Epicardial pacing wires cut without difficulty. Patient tolerated well  --chest tubes without significant output and without airleaks. Remove today. Chest tube(s) removed without difficulty. Patient tolerated well  - empiric zosyn secondary to emergent chest re-exploration ( day 5 )    -FARHAT dressing removed      2. Acute blood loss anemia secondary to open heart surgery  --stable- hgb 9.8       3.  NSR  -- No BB started yet secondary to SBPs in upper 90's low 100s  -- will cont to monitor       4. Prolonged postoperative respiratory insufficiency  --wean oxygen to keep SpO2 greater than or equal to 92%  --continue duonebs with ezpap  --encourage C&DB, SMI  --currently on 4L O2/NC  -- pulm on board- appreciate pulm input  -- Cont daily Lasix     5. Dysphagia  - complains of difficulty swallowing \" feels like she is chocking every time she tries to swallow pills \"  - will order swallow study         6.  Post operative deconditioning  --Increase activity as tolerated  --PT/OT         DVT prophylaxis:  --continue bilateral knee high MARIA TERESA hose  --continue PCDs  --continue progressive ambulation  --Add lovenox for dvt prophylaxis and continue knee high MARIA TERESA hose/pcds/progressive ambulation      Dispo: home vs. Rehab pending progression in activity level        This patient's case and care plan was discussed with the attending surgeon

## 2021-06-14 NOTE — CARE COORDINATION
SOCIAL WORK/CASEMANAGEMENT TRANSITION OF CARE [id number] Sharon Owen, [address], [facility], [phone]): I met with pt and spouse in the room this a.m. pt ambulated 79' with cardiac rehab and they were in the room to walk her again. Pt complaining of being fatigued. Plan per couple is home and spouse has no preference to [facility] agency when I provided a list to him on Friday. [facility] is setup. Left note for rn that pt has no pcp and will need a appt for one on discharge. Also, I provided spouse with a jake list for [phone] last Friday as well if pt doesn't progress close to 400' before going home. Pt has medicare so a precert is not needed. Swallow eval pending. Pt was transferred to pccu from [facility] last night. Marcelino/kenroy to follow. [facility] Wal[facility]  6/14/2021  Spouse prefer we try dr. Tyree Love instead of a [facility] pcp first for a appt. When discharged. That is his pcp. Also, [employer] benefits saw pt and they are not eligible and pt has [facility] for prescriptions. Pt was provided with a 20% discount to her bill it is noted. [facility] Walden  .6/14/2021

## 2021-06-14 NOTE — PROGRESS NOTES
Pt refuses to wear bipap tonight. Remains at bedside. Understood to call at anytime if she changes her mind.

## 2021-06-15 LAB
ANION GAP SERPL CALCULATED.3IONS-SCNC: 7 MMOL/L (ref 7–16)
BUN BLDV-MCNC: 32 MG/DL (ref 6–23)
CALCIUM SERPL-MCNC: 9.1 MG/DL (ref 8.6–10.2)
CHLORIDE BLD-SCNC: 103 MMOL/L (ref 98–107)
CO2: 33 MMOL/L (ref 22–29)
CREAT SERPL-MCNC: 0.8 MG/DL (ref 0.5–1)
GFR AFRICAN AMERICAN: >60
GFR NON-AFRICAN AMERICAN: >60 ML/MIN/1.73
GLUCOSE BLD-MCNC: 123 MG/DL (ref 74–99)
HCT VFR BLD CALC: 31.3 % (ref 34–48)
HEMOGLOBIN: 10 G/DL (ref 11.5–15.5)
MCH RBC QN AUTO: 29.3 PG (ref 26–35)
MCHC RBC AUTO-ENTMCNC: 31.9 % (ref 32–34.5)
MCV RBC AUTO: 91.8 FL (ref 80–99.9)
METER GLUCOSE: 113 MG/DL (ref 74–99)
METER GLUCOSE: 138 MG/DL (ref 74–99)
METER GLUCOSE: 160 MG/DL (ref 74–99)
PDW BLD-RTO: 15.2 FL (ref 11.5–15)
PLATELET # BLD: 283 E9/L (ref 130–450)
PMV BLD AUTO: 11.1 FL (ref 7–12)
POTASSIUM SERPL-SCNC: 4.5 MMOL/L (ref 3.5–5)
RBC # BLD: 3.41 E12/L (ref 3.5–5.5)
SODIUM BLD-SCNC: 143 MMOL/L (ref 132–146)
WBC # BLD: 9.1 E9/L (ref 4.5–11.5)

## 2021-06-15 PROCEDURE — 94640 AIRWAY INHALATION TREATMENT: CPT

## 2021-06-15 PROCEDURE — 6370000000 HC RX 637 (ALT 250 FOR IP): Performed by: THORACIC SURGERY (CARDIOTHORACIC VASCULAR SURGERY)

## 2021-06-15 PROCEDURE — 6360000002 HC RX W HCPCS: Performed by: THORACIC SURGERY (CARDIOTHORACIC VASCULAR SURGERY)

## 2021-06-15 PROCEDURE — 97535 SELF CARE MNGMENT TRAINING: CPT

## 2021-06-15 PROCEDURE — 97530 THERAPEUTIC ACTIVITIES: CPT

## 2021-06-15 PROCEDURE — 6370000000 HC RX 637 (ALT 250 FOR IP): Performed by: PHYSICIAN ASSISTANT

## 2021-06-15 PROCEDURE — 6370000000 HC RX 637 (ALT 250 FOR IP): Performed by: INTERNAL MEDICINE

## 2021-06-15 PROCEDURE — 85027 COMPLETE CBC AUTOMATED: CPT

## 2021-06-15 PROCEDURE — 36415 COLL VENOUS BLD VENIPUNCTURE: CPT

## 2021-06-15 PROCEDURE — 2700000000 HC OXYGEN THERAPY PER DAY

## 2021-06-15 PROCEDURE — 2500000003 HC RX 250 WO HCPCS: Performed by: INTERNAL MEDICINE

## 2021-06-15 PROCEDURE — 2580000003 HC RX 258: Performed by: THORACIC SURGERY (CARDIOTHORACIC VASCULAR SURGERY)

## 2021-06-15 PROCEDURE — 2140000000 HC CCU INTERMEDIATE R&B

## 2021-06-15 PROCEDURE — 6360000002 HC RX W HCPCS: Performed by: PHYSICIAN ASSISTANT

## 2021-06-15 PROCEDURE — 93798 PHYS/QHP OP CAR RHAB W/ECG: CPT

## 2021-06-15 PROCEDURE — 80048 BASIC METABOLIC PNL TOTAL CA: CPT

## 2021-06-15 PROCEDURE — 94660 CPAP INITIATION&MGMT: CPT

## 2021-06-15 PROCEDURE — 82962 GLUCOSE BLOOD TEST: CPT

## 2021-06-15 RX ORDER — BUMETANIDE 0.25 MG/ML
1 INJECTION, SOLUTION INTRAMUSCULAR; INTRAVENOUS 2 TIMES DAILY
Status: DISCONTINUED | OUTPATIENT
Start: 2021-06-15 | End: 2021-06-17 | Stop reason: HOSPADM

## 2021-06-15 RX ORDER — LEVALBUTEROL INHALATION SOLUTION 0.63 MG/3ML
0.63 SOLUTION RESPIRATORY (INHALATION) EVERY 8 HOURS PRN
Status: DISCONTINUED | OUTPATIENT
Start: 2021-06-15 | End: 2021-06-17 | Stop reason: HOSPADM

## 2021-06-15 RX ADMIN — FERROUS SULFATE TAB 325 MG (65 MG ELEMENTAL FE) 325 MG: 325 (65 FE) TAB at 17:12

## 2021-06-15 RX ADMIN — INSULIN LISPRO 1 UNITS: 100 INJECTION, SOLUTION INTRAVENOUS; SUBCUTANEOUS at 11:36

## 2021-06-15 RX ADMIN — PANTOPRAZOLE SODIUM 40 MG: 40 TABLET, DELAYED RELEASE ORAL at 08:40

## 2021-06-15 RX ADMIN — CLOPIDOGREL 75 MG: 75 TABLET, FILM COATED ORAL at 08:41

## 2021-06-15 RX ADMIN — BISACODYL 5 MG: 5 TABLET, COATED ORAL at 08:40

## 2021-06-15 RX ADMIN — METOPROLOL TARTRATE 12.5 MG: 25 TABLET, FILM COATED ORAL at 20:55

## 2021-06-15 RX ADMIN — Medication 10 ML: at 08:42

## 2021-06-15 RX ADMIN — Medication 400 MG: at 08:41

## 2021-06-15 RX ADMIN — ATORVASTATIN CALCIUM 20 MG: 20 TABLET, FILM COATED ORAL at 20:55

## 2021-06-15 RX ADMIN — FUROSEMIDE 40 MG: 10 INJECTION, SOLUTION INTRAVENOUS at 08:41

## 2021-06-15 RX ADMIN — DOCUSATE SODIUM 50 MG AND SENNOSIDES 8.6 MG 1 TABLET: 8.6; 5 TABLET, FILM COATED ORAL at 08:40

## 2021-06-15 RX ADMIN — FAMOTIDINE 20 MG: 20 TABLET ORAL at 08:41

## 2021-06-15 RX ADMIN — OXYCODONE HYDROCHLORIDE AND ACETAMINOPHEN 500 MG: 500 TABLET ORAL at 20:56

## 2021-06-15 RX ADMIN — HYDROCODONE BITARTRATE AND ACETAMINOPHEN 2 TABLET: 5; 325 TABLET ORAL at 20:56

## 2021-06-15 RX ADMIN — DIPHENHYDRAMINE HYDROCHLORIDE 25 MG: 25 TABLET ORAL at 20:56

## 2021-06-15 RX ADMIN — ASPIRIN 81 MG: 81 TABLET, COATED ORAL at 08:41

## 2021-06-15 RX ADMIN — IPRATROPIUM BROMIDE AND ALBUTEROL SULFATE 1 AMPULE: 2.5; .5 SOLUTION RESPIRATORY (INHALATION) at 15:54

## 2021-06-15 RX ADMIN — METOPROLOL TARTRATE 12.5 MG: 25 TABLET, FILM COATED ORAL at 09:43

## 2021-06-15 RX ADMIN — FOLIC ACID 1 MG: 1 TABLET ORAL at 08:41

## 2021-06-15 RX ADMIN — MAGNESIUM HYDROXIDE 30 ML: 2400 SUSPENSION ORAL at 08:41

## 2021-06-15 RX ADMIN — IPRATROPIUM BROMIDE AND ALBUTEROL SULFATE 1 AMPULE: 2.5; .5 SOLUTION RESPIRATORY (INHALATION) at 12:21

## 2021-06-15 RX ADMIN — OXYCODONE HYDROCHLORIDE AND ACETAMINOPHEN 500 MG: 500 TABLET ORAL at 08:41

## 2021-06-15 RX ADMIN — Medication 10 ML: at 20:56

## 2021-06-15 RX ADMIN — BUMETANIDE 1 MG: 0.25 INJECTION INTRAMUSCULAR; INTRAVENOUS at 20:55

## 2021-06-15 RX ADMIN — FERROUS SULFATE TAB 325 MG (65 MG ELEMENTAL FE) 325 MG: 325 (65 FE) TAB at 08:41

## 2021-06-15 RX ADMIN — ENOXAPARIN SODIUM 40 MG: 40 INJECTION SUBCUTANEOUS at 08:41

## 2021-06-15 ASSESSMENT — PAIN DESCRIPTION - ORIENTATION: ORIENTATION: MID

## 2021-06-15 ASSESSMENT — PAIN SCALES - GENERAL
PAINLEVEL_OUTOF10: 0
PAINLEVEL_OUTOF10: 1
PAINLEVEL_OUTOF10: 3
PAINLEVEL_OUTOF10: 0
PAINLEVEL_OUTOF10: 5
PAINLEVEL_OUTOF10: 7

## 2021-06-15 ASSESSMENT — PAIN DESCRIPTION - PAIN TYPE
TYPE: SURGICAL PAIN;ACUTE PAIN
TYPE: ACUTE PAIN;SURGICAL PAIN

## 2021-06-15 ASSESSMENT — PAIN DESCRIPTION - LOCATION: LOCATION: CHEST

## 2021-06-15 NOTE — PROGRESS NOTES
Domenica Reich M.D.,[facility]  Marshall Hsieh D.O., FNICOLETTEODaveyI., Raffy Parker M.D. Angelo Tai M.D., Sai Dsouza M.D. Branden Vickers D.O. Daily Pulmonary Progress Note    Patient:  Sierra Hartmann 78 y.o. female MRN: [id number]     Date of Service: 6/15/2021      Synopsis     We are following patient for hypoxia     \"CC\" chest pain     Code status: full code       Subjective      Patient was seen and examined. Lying in bed in no acute distress. Mild dyspnea at rest.  Oxygen weaned to 1 L nasal cannula. Progressing with therapy. Occasional cough no mucus.     Objective   Vitals: BP (!) 117/55   Pulse 92   Temp 97.9 °F (36.6 °C)   Resp 16   Ht 5' 1.5\" (1.562 m)   Wt 103 lb 12.8 oz (47.1 kg)   SpO2 92% Comment: 92 before walk, 94 during and after walk  BMI 19.30 kg/m²     I/O:    Intake/Output Summary (Last 24 hours) at 6/15/2021 1104  Last data filed at 6/15/2021 1038  Gross per 24 hour   Intake 240 ml   Output 860 ml   Net -620 ml       Vent Information  $Ventilation: Off Vent  Skin Assessment: Clean, dry, & intact  Equipment ID: [id number]  Vent Type: 980  Vent Mode: (S) PS  Vt Ordered: 0 mL  Rate Set: 0 bmp  Peak Flow: 0 L/min  Pressure Support: 10 cmH20  FiO2 : 60 %  SpO2: 92 % (92 before walk, 94 during and after walk)  SpO2/FiO2 ratio: 163.33  Sensitivity: 2  PEEP/CPAP: 5  I Time/ I Time %: 0.9 s  Humidification Source: Heated wire  Humidification Temp: 37  Humidification Temp Measured: 36.7  Circuit Condensation: Not drained  Mask Type: Full face mask  Mask Size: Small       IPAP: 10 cmH20  CPAP/EPAP: 7 cmH2O     CURRENT MEDS :  Scheduled Meds:   metoprolol tartrate  12.5 mg Oral BID    bumetanide  1 mg Intravenous BID    atorvastatin  20 mg Oral Nightly    enoxaparin  40 mg Subcutaneous Daily    aspirin  81 mg Oral Daily    bisacodyl  5 mg Oral Daily    sennosides-docusate sodium  1 tablet Oral BID    magnesium hydroxide  30 mL Oral Daily    ferrous sulfate  325 mg Oral BID     vitamin C  500 mg Oral BID    folic acid  1 mg Oral Daily    insulin lispro  0-6 Units Subcutaneous TID     insulin lispro  0-3 Units Subcutaneous Nightly    bisacodyl  10 mg Rectal Daily    sodium chloride flush  10 mL Intravenous 2 times per day    magnesium oxide  400 mg Oral Daily    pantoprazole  40 mg Oral Daily    ipratropium-albuterol  1 ampule Inhalation Q4H WA    clopidogrel  75 mg Oral Daily    famotidine  20 mg Oral Daily       Physical Exam:  General Appearance: appears comfortable in no acute distress. HEENT: Normocephalic atraumatic without obvious abnormality   Neck: Lips, mucosa, and tongue normal.  Supple, symmetrical, trachea midline, no adenopathy;thyroid:  no enlargement/tenderness/nodules or JVD. Lung: Breath sounds diminished bilaterally few basilar crackles. Respirations   unlabored. Symmetrical expansion. Heart: RRR, normal S1, S2. No MRG  Abdomen: Soft, NT, ND. BS present x 4 quadrants. No bruit or organomegaly. Extremities: Pedal pulses 2+ symmetric b/l. Extremities normal, no cyanosis, clubbing, or edema. Musculokeletal: No joint swelling, no muscle tenderness. ROM normal in all joints of extremities. Neurologic: Mental status: Alert and Oriented X3 . Pertinent/ New Labs and Imaging Studies     Imaging Personally Reviewed:  cxr 6/14  1. No interval change in the bibasilar airspace disease and small bilateral   pleural effusions. 2. There is no right or left pneumothorax.      Labs:  Lab Results   Component Value Date    WBC 9.1 06/15/2021    HGB 10.0 06/15/2021    HCT 31.3 06/15/2021    MCV 91.8 06/15/2021    MCH 29.3 06/15/2021    MCHC 31.9 06/15/2021    RDW 15.2 06/15/2021     06/15/2021    MPV 11.1 06/15/2021     Lab Results   Component Value Date     06/15/2021    K 4.5 06/15/2021    K 4.1 06/08/2021     06/15/2021    CO2 33 06/15/2021    BUN 32 06/15/2021    CREATININE 0.8 06/15/2021    LABALBU 4.2 06/08/2021    CALCIUM

## 2021-06-15 NOTE — PATIENT CARE CONFERENCE
[facility] Quality Flow/Interdisciplinary Rounds Progress Note        Quality Flow Rounds held on Lesley 15, 2021    Disciplines Attending:  Bedside Nurse, ,  and Nursing Unit Leadership    Moi Pena was admitted on 6/7/2021  1:23 PM    Anticipated Discharge Date:  Expected Discharge Date: 06/17/21    Disposition:    Ady Score:  Ady Scale Score: 18    Readmission Risk              Risk of Unplanned Readmission:  8           Discussed patient goal for the day, patient clinical progression, and barriers to discharge.   The following Goal(s) of the Day/Commitment(s) have been identified:  increase activity, wean Ty Reed, RN  Lesley 15, 2021

## 2021-06-15 NOTE — PROGRESS NOTES
[address]     Date:6/15/2021  Patient Name: Lisseth Shannon  MRN: [id number]  : 1950  Room: [address] [place] [address]     Per OT Eval:    Evaluating OT: Sharad Velasquez OTR/L [id number]     Referring Provider: Amado Espino DO   Specific Provider Orders/Date: OT eval and treat (21)        Diagnosis: Severe MV CAD; NSTEMI       Surgery/Procedures:  CABG X 4      6/10  1.  Re-exploration of mediastinum. 2.  Mediastinal washout. 3.  Evacuation of hematoma. 4.  Control of hemorrhage.      Pertinent Medical History: CAD, COPD, HTN, HLD      *Precautions:  Fall Risk, sternal, chest tubes x 2, BIPAP     Assessment of current deficits   [x]? Functional mobility            [x]?ADLs           [x]? Strength                  []?Cognition  [x]? Functional transfers          [x]? IADLs          [x]? Safety Awareness   [x]? Endurance  []? Fine Motor Coordination   [x]? Balance       []? Vision/perception    []? Sensation      []? Gross Motor Coordination [x]? ROM           []? Delirium                  []? Motor Control     []? Communication      OT PLAN OF CARE   OT POC based on physician orders, patient diagnosis and results of clinical assessment.        Frequency/Duration: 1-3 days/wk for 1-2 weeks PRN     Specific OT Treatment to include:   ADL retraining/adapted techniques and AE recommendations to increase functional independence within precautions                    Energy conservation techniques to improve tolerance for selfcare routine   Functional transfer/mobility training/DME recommendations for increased independence, safety and fall prevention         Patient/family education to increase safety and functional independence within precautions             Environmental modifications for safe mobility and completion of ADLs                             Therapeutic activity to improve functional performance during ADLs                                         Therapeutic exercise to improve tolerance and functional strength for ADLs   Balance retraining exercises/tasks for facilitation of postural control with dynamic challenges during ADLs .       Recommended Adaptive Equipment:  LB dressing AE as needed for energy conservation, shower seat     Home Living: Pt lives with   in a 2 floor plan with 1 + \" a couple\" step(s) to kitchen. Bathrooms on both levels; pt will need a bed on first floor for first floor living. Bathroom setup: tub/shower; standard height commode  Equipment owned: shower seat, walker, SPC     Prior Level of Function: IND with ADLs;  IND with IADLs. No device for ambulation. Driving: yes     Pain Level: Pt did not complain of pain this session     Cognition: A&O: 4/4    Follows 1-2 step commands appropriately. Memory: Good              Comprehension Good              Problem solving: Fair              Judgement/safety: Fair- (regarding BIPAP)                 Communication skills: WFL              Vision: WFL                     Glasses: yes                                                        Hearing: WFL                RASS: 0  CAM-ICU: (NT) Delirium      UE Assessment:  Hand Dominance: Right [x]?   Left []?       ROM Strength STM goal: PRN   RUE  Grossly WFL within precautions Not formally tested; grossly WFL              WNL for ADLS      LUE Grossly WFL within precautions Not formally tested; grossly WFL              WNL for ADLS         Sensation: No c/o numbness or tingling in extremities   Tone: WNL   Edema: [facility]     Functional Assessment: AM-PAC Daily Activity Raw Score: 17/24    Initial Eval Status  Date: 6/11 Treatment Status  Date: 6/15/21 STG=LTG  Time Frame: 5-7 days   Feeding NT due to BIPAP  Setup  Pt able to grasp cup, bring to mouth, drinking from straw                      Manfred  while seated up in chair to increase activity tolerance         Grooming Max A SBA  Pt washed face, applied deodorant seated upright in chair at sink                       S   while standing sink level demonstrating G tolerance; G balance.      UB dressing/bathing Max A Reed-dress  Gm/doff hospital gown seated    SBA-bath  Pt compelted sponge bathing task seated, with pt able to wash of UB                       Min A   demonstrating G knowledge of precautions during tasks      LB dressing/bathing Max A  (crossing LE's; decreased activity tolerance/breathing)  SBA-dress  Pt donned/doffed hospital socks using cross over technique seated EOB  DNT pants this date    Reed-bath  Pt completed sponge bathing task seated/standing, using of cross over technique to wash of LE's, standing to wash of buttocks/jake area with CGA                          Min A  using AE as needed for safe reach/ energy conservation        Toileting NT Reed  Pt completed toileting task on standard commode, with pt able to complete of hygiene to jake area, assistance with transfer for safety, cueing for hand placement to follow of sternal precautions                       Min A      Bed Mobility  Supine to sit:   Mod A+2     Sit to supine: NT Reed  Supine<>EOB  EOB<>Supine    HOB elevated                        Min A  in prep of ADL tasks & transfers   Functional Transfers Sit to stand: Min A  from higher bed surface;     Stand to sit: Min A Reed/CGA  Sit to Stand   Stand to Sit    Cueing for hand placement to follow of sternal precautions                       S  sit<>stand/functional bathroom transfers using AD/DME as needed for balance and safety   Functional Mobility Min A  no device Reed  Pt ambulated short household distance in room EOB<>Bathroom with no device                        S   functional/bathroom mobility using AD as needed & demonstrating G safety      Balance Sitting:     Static:  S    Dynamic:Min A  Standing: Min A Sitting EOB:  Supervision    Standing:  Reed/CGA  S dynamic sitting balance; S dynamic standing balance  during ADL tasks & transfers   Endurance/Activity Tolerance    F tolerance with light activity. Fair-  G   tolerance with moderate activity/self care routine   Visual/  Perceptual               WFL                                         Education:  Pt was educated through out treatment regarding precautions to follow, proper technique & safety with bed mobility, functional transfers & mobility, techniques to assist with LB dressing/bathing tasks to improve safety & prevent falls and allow pt to return home safely. Comments: Upon arrival pt seated upright in bed, agreeable to therapy after encouragement, speaking with nursing okaying pt to be seen this session. Pt completed of bed mobility, functional mobility, transfers and ADL tasks this session. At end of session, pt seated upright in bed, all lines and tubes intact, call light within reach. · Pt has made fair progress towards set goals.    · Continue with current plan of care focusing on increasing of independency with transfers and ADL tasks      Treatment Time In: 3:11pm            Treatment Time Out: 3:35pm             Treatment Charges: Mins Units   Ther Ex  [id number]     Manual Therapy 72685     Thera Activities 53447     ADL/Home Mgt 14224 24 2   Neuro Re-ed 11222     Group Therapy      Orthotic manage/training  [id number]     Non-Billable Time     Total Timed Treatment 24 2        Ember Miramontes SANTIAGO/L [id number]

## 2021-06-15 NOTE — PROGRESS NOTES
Physical Therapy  Physical Therapy  Name: Svetlana Delvalle  : 1950  MRN: [id number]      Date of Service: 6/15/2021    Evaluating PT:  Frank Gray, PT, DPT [id number]    Room #:  7463/9157-T  Diagnosis:  CAD in native artery [I25.10]  PMHx/PSHx:  COPD, HLD, HTN  Procedure/Surgery:   CABG x 4 then re-exploration with control of hemorrhage  Precautions:  Falls, Sternal, Chest tube x 2, bipap  Equipment Needs:  TBD    SUBJECTIVE:    Pt lives with  in a 2 story home with 1 stairs to enter and no rail. Full flight of steps and 1 rail to bedroom. Pt ambulated without device and was independent PTA. OBJECTIVE:   Initial Evaluation  Date: 21 Treatment 6/15/21 Short Term/ Long Term   Goals   AM-PAC 6 Clicks     Was pt agreeable to Eval/treatment? Yes yes    Does pt have pain? 7/10 back pain 5/10 back pain    Bed Mobility  Rolling: NT  Supine to sit: ModA x 2 with HOB elevated  Sit to supine: NT  Scooting: MaxA Rolling SBA  Supine to sit Terese  Sit to supine Terese  Scooting Terese Terese   Transfers Sit to stand: Terese  Stand to sit: Terese  Stand pivot: Terese no device Sit to stand Terese  Stand to sit Terese  Stand pivot Terese  Independent   Ambulation   3 feet with Terese no device 50 feet x3 reps without A.D. Terese >400 feet Independently   Stair negotiation: ascended and descended NT N/T >4 steps with 1 rail Mod Independent   ROM BUE:  Defer to OT note  BLE:  WFL     Strength BUE:  Defer to OT note  BLE:  4/5  Increase by 1/3 MMT grade   Balance Sitting EOB:  Terese  Dynamic Standing:  Terese no device Sitting EOB SBA  Standing eTrese Sitting EOB:  Independent  Dynamic Standing:  Independent     Pt is A & O x 4    Sensation:  No reported paresthesias  Edema:  None    Vitals:  Heart Rate at rest 105 bpm Heart Rate post session 110 bpm   SpO2 at rest 97% SpO2 post session 95%               Therapeutic Exercises: Ankle pumps 30x, LAQS 2x10, heel slides 2x10.     Patient education  Pt educated on safety    Patient response to education:   Pt verbalized understanding Pt demonstrated skill Pt requires further education in this area   x x x     ASSESSMENT:    Conditions Requiring Skilled Therapeutic Intervention:    [x]Decreased strength     []Decreased ROM  [x]Decreased functional mobility  [x]Decreased balance   [x]Decreased endurance   []Decreased posture  []Decreased sensation  []Decreased coordination   []Decreased vision  []Decreased safety awareness   [x]Increased pain       Comments: Pt cleared by nurse prior to tx session. Pt found in bed and agreed to tx session. Pt on O2 throughout tx and monitored. Pt rolled SBA and required Terese for trunk supine <> sit. Pt sat EOB and performed ex. Pt required Terese for transfers. Pt follows sternal precautions. Pt amb without A.D Allie Banister. Pt required rest breaks in between amb. Cues for PLB. Pt requested to use the bathroom. Pt assisted onto and off commode. Pt assisted back to bed and rep[ositioned. Pt given call light. Treatment:  Patient practiced and was instructed in the following treatment:     Bed mobility training - pt given verbal and tactile cues to facilitate proper sequencing and safety during supine<>sit as well as provided with physical assistance to complete task    Sitting EOB for >5 minutes for upright tolerance, postural awareness and BLE ROM   Transfer training - pt was given verbal and tactile cues to facilitate proper hand placement, technique and safety during sit to stand and stand to sit as well as provided with physical assistance to complete task.  Gait training- pt was given verbal and tactile cues to facilitate safety and balance during ambulation as well as provided with physical assistance to complete task. Pt's/ family goals   1. Return home    Prognosis is good for reaching above PT goals. Patient and or family understand(s) diagnosis, prognosis, and plan of care.   Yes    PHYSICAL THERAPY PLAN OF CARE:    PT POC is established based on physician order and patient diagnosis     Referring provider/PT Order:  Moody Velez, DO/ 06/10/21 0600 PT eval and treat  Diagnosis:  CAD in native artery [I25.10]  Specific instructions for next treatment:  Progress ambulation and tolerance to upright activity. Current Treatment Recommendations:     [x] Strengthening to improve independence with functional mobility   [] ROM to improve independence with functional mobility   [x] Balance Training to improve static/dynamic balance and to reduce fall risk  [x] Endurance Training to improve activity tolerance during functional mobility   [x] Transfer Training to improve safety and independence with all functional transfers   [x] Gait Training to improve gait mechanics, endurance and asses need for appropriate assistive device  [x] Stair Training in preparation for safe discharge home and/or into the community   [] Positioning to prevent skin breakdown and contractures  [x] Safety and Education Training   [x] Patient/Caregiver Education   [] HEP  [] Other     PT long term treatment goals are located in above grid    Frequency of treatments: 2-5x/week x 1-2 weeks.     Time in  13:15  Time out  13:45    Total Treatment Time  30 minutes       CPT codes:  [] Low Complexity PT evaluation 39218  [] Moderate Complexity PT evaluation 02323  [] High Complexity PT evaluation 45620  [] PT Re-evaluation 88629  [] Gait training 84191 - minutes  [] Manual therapy 74952 - minutes  [x] Therapeutic activities 27031 30 minutes  [] Therapeutic exercises 06116 - minutes  [] Neuromuscular reeducation 56646 - minutes     Tiffany Morgan [id number]

## 2021-06-15 NOTE — PROGRESS NOTES
DAILY PROGRESS NOTE - [facility]    SUBJECTIVE:    She is being followed for non-STEMI and multivessel CAD post CABG utilizing LIMA-LAD, veins to ramus, OM, PDA. Preserved left ventricular systolic function. Currently sitting in bedside chair and having difficulty with swallowing. Mild dyspnea with exertion. On IV diuretic but currently 3 L positive. Hypertension, hyperlipidemia, diabetes, COPD. OBJECTIVE:    Her vital signs were reviewed today. Vitals:    06/15/21 0815   BP: (!) 117/55   Pulse: 92   Resp: 16   Temp: 97.9 °F (36.6 °C)   SpO2: 94%       Scheduled Meds:   metoprolol tartrate  12.5 mg Oral BID    atorvastatin  20 mg Oral Nightly    enoxaparin  40 mg Subcutaneous Daily    aspirin  81 mg Oral Daily    bisacodyl  5 mg Oral Daily    sennosides-docusate sodium  1 tablet Oral BID    magnesium hydroxide  30 mL Oral Daily    ferrous sulfate  325 mg Oral BID WC    vitamin C  500 mg Oral BID    folic acid  1 mg Oral Daily    insulin lispro  0-6 Units Subcutaneous TID WC    insulin lispro  0-3 Units Subcutaneous Nightly    bisacodyl  10 mg Rectal Daily    furosemide  40 mg Intravenous Daily    sodium chloride flush  10 mL Intravenous 2 times per day    magnesium oxide  400 mg Oral Daily    pantoprazole  40 mg Oral Daily    ipratropium-albuterol  1 ampule Inhalation Q4H WA    clopidogrel  75 mg Oral Daily    famotidine  20 mg Oral Daily     Continuous Infusions:    PRN Meds:.acetaminophen, HYDROcodone 5 mg - acetaminophen **OR** HYDROcodone 5 mg - acetaminophen, potassium chloride, ondansetron, diphenhydrAMINE, sodium chloride flush    REVIEW OF SYSTEMS:     No pruritus, rash, bruising. Cardiac symptoms per HPI. No nausea, vomiting, abdominal pain, GI bleeding, change in bowel habits. No dysuria, urinary frequency, urgency, hematuria, flank pain. Joint pain but no muscle soreness, stiffness, aching. No headache, speech disturbance, lateralizing neurologic deficit.   No hemoptysis, epistaxis, easy bruising. No anxiety, depression. No symptoms of hypothyroidism, hyperthyroidism, diabetes, heat or cold intolerance. FAMILY HISTORY: Negative for CAD in first-degree relatives. SOCIAL HISTORY: Negative for alcohol, tobacco, or illicit drug use. PHYSICAL EXAM:    General Appearance:  awake, alert, oriented, in no acute distress  Neck:  no bruits  Lungs:  Normal expansion. Decreased breath sounds at bases to auscultation. No rales, rhonchi, or wheezing. Heart:  Heart sounds are normal.  Regular rate and rhythm without murmur, gallop or rub. Abdomen:  Soft, non-tender. Extremities: Extremities warm to touch, pink, with no edema. Neuro/musculosketal:  Unremarkable. LABS:    Recent Labs     06/13/21  0400 06/14/21  0519 06/15/21  0528    140 143   CREATININE 0.9 0.8 0.8       Recent Labs     06/13/21  0400 06/14/21  0519 06/15/21  0528   HGB 9.1* 9.8* 10.0*       No results for input(s): INR in the last 72 hours. IMPRESSION:    #1. CAD post CABG-stable and doing well. On both aspirin and clopidogrel. Per CT surgery, clopidogrel to be continued for 6 weeks because of smaller LIMA size. #2. Hypertension-blood pressure controlled and no new antihypertensives added at this time. #3. Hyperlipidemia-continue indefinite statin. #4.  Diabetes-per intensivist.    #5.  Acute diastolic heart failure-potentially due to fluid administration. Lung sounds are diminished. She is 3.5 L positive. Change daily furosemide to IV bumetanide 1 mg twice daily. Strict urine output and daily weight. 1.5 L/day fluid maximum. #6.  Continue to follow.

## 2021-06-15 NOTE — PROGRESS NOTES
POD#6 Awake, alert. No complaints. Denies CP, palpitations, SOB at rest, dizziness/lightheadedness. Vitals:    06/15/21 0403 06/15/21 0614 06/15/21 0737 06/15/21 0815   BP: 113/66   (!) 117/55   Pulse: 90   92   Resp: 14   16   Temp: 97.5 °F (36.4 °C)   97.9 °F (36.6 °C)   TempSrc: Temporal      SpO2: 96%  93% 94%   Weight:  103 lb 12.8 oz (47.1 kg)     Height:         O2: 1L/NC      Intake/Output Summary (Last 24 hours) at 6/15/2021 0913  Last data filed at 6/14/2021 2343  Gross per 24 hour   Intake 240 ml   Output 460 ml   Net -220 ml         +BM on 6/15      Recent Labs     06/13/21  0400 06/14/21  0519 06/15/21  0528   WBC 11.1 8.4 9.1   HGB 9.1* 9.8* 10.0*   HCT 27.9* 30.3* 31.3*    207 283      Recent Labs     06/13/21  0400 06/14/21  0519 06/15/21  0528   BUN 28* 35* 32*   CREATININE 0.9 0.8 0.8       CXR (6/14): small bilateral pleural effusions  Telemetry: NSR      PE  Cardiac: RRR  Lungs: decreased bases  Chest incision C/D/I. Sternum stable. Prior chest tube incisions and sutures intact. Abd: Soft, nontender, +BS  Ext: MART, minimal edema             A/P: POD# 6     1. CAD  --Stable s/p CABG x 4 ( cryo vein) s/p Exploration of mediastinum, Control of hemorrhage (small branch of left subclavian vein)  --Post op AVELINA reveals preserved  EF  --Scr stable at 0.8  --DAPT/statin- Cont plavix x 6 weeks due to smaller size of LIMA   --reinforce sternal precautions  --All tubes and wires removed  --empiric zosyn secondary to emergent chest re-exploration - discontinued on 6/13? Will discuss with attending        2. Acute blood loss anemia secondary to open heart surgery  --stable- hgb 10.0        3. NSR  -- /55 today - will start low dose BB today  -- will cont to monitor         4.  Prolonged postoperative respiratory insufficiency  --wean oxygen to keep SpO2 greater than or equal to 92%  --continue duonebs with ezpap  --encourage C&DB, SMI  --currently on 1L O2/NC at 94%  --pulm on board- appreciate pulm input  --Cont daily Lasix        5. Dysphagia  - complains of difficulty swallowing \" feels like she is chocking every time she tries to swallow pills \"  - Swallow study conducted 6/14 - \"Inconsistent signs of aspiration noted\" - recommended pureed consistency solids with thin liquids          6. Post operative deconditioning  --Increase activity as tolerated  --PT/OT  --Currently ambulated 150 ft           DVT prophylaxis:  --continue bilateral knee high MARIA TERESA hose  --continue PCDs  --continue progressive ambulation  --Add lovenox for dvt prophylaxis and continue knee high MARIA TERESA hose/pcds/progressive ambulation        Dispo:  Will monitor another day but most likely rehab         This patient's case and care plan was discussed with the attending surgeon

## 2021-06-15 NOTE — CARE COORDINATION
SOCIAL WORK/CASEMANAGEMENT TRANSITION OF CARE WARNAXBR[address] [place], [address], [place], [phone]): met with pt and spouse in the room this am. Pt ambulated 79' and 150' yesterday and 80' and [de-identified]' today with cardiac rehab. Pt is not sure she will be able to ambulate the 400' needed. She is on 1l o2 nc with purred die and thin liquids. [address] [place] [address] is setup but I provided them with [employer] [facility] [employer] since they reside in [place] if jake is needed. Pt has medicare so a precert is not needed. [place] to follow.  MU Bolton  6/15/2021

## 2021-06-15 NOTE — PLAN OF CARE
Problem: Pain:  Goal: Pain level will decrease  Description: Pain level will decrease  Outcome: Met This Shift     Problem: Falls - Risk of:  Goal: Will remain free from falls  Description: Will remain free from falls  Outcome: Met This Shift  Goal: Absence of physical injury  Description: Absence of physical injury  Outcome: Met This Shift     Problem: Gas Exchange - Impaired:  Goal: Levels of oxygenation will improve  Description: Levels of oxygenation will improve  Outcome: Met This Shift     Problem: Pain:  Goal: Pain level will decrease  Description: Pain level will decrease  Outcome: Met This Shift     Problem: Skin Integrity:  Goal: Will show no infection signs and symptoms  Description: Will show no infection signs and symptoms  Outcome: Met This Shift

## 2021-06-16 ENCOUNTER — APPOINTMENT (OUTPATIENT)
Dept: GENERAL RADIOLOGY | Age: 71
DRG: 233 | End: 2021-06-16
Attending: FAMILY MEDICINE
Payer: MEDICARE

## 2021-06-16 LAB
ANION GAP SERPL CALCULATED.3IONS-SCNC: 9 MMOL/L (ref 7–16)
BUN BLDV-MCNC: 28 MG/DL (ref 6–23)
CALCIUM SERPL-MCNC: 9.5 MG/DL (ref 8.6–10.2)
CHLORIDE BLD-SCNC: 101 MMOL/L (ref 98–107)
CO2: 31 MMOL/L (ref 22–29)
CREAT SERPL-MCNC: 0.9 MG/DL (ref 0.5–1)
GFR AFRICAN AMERICAN: >60
GFR NON-AFRICAN AMERICAN: >60 ML/MIN/1.73
GLUCOSE BLD-MCNC: 123 MG/DL (ref 74–99)
HCT VFR BLD CALC: 33 % (ref 34–48)
HEMOGLOBIN: 10.5 G/DL (ref 11.5–15.5)
MCH RBC QN AUTO: 29.4 PG (ref 26–35)
MCHC RBC AUTO-ENTMCNC: 31.8 % (ref 32–34.5)
MCV RBC AUTO: 92.4 FL (ref 80–99.9)
METER GLUCOSE: 109 MG/DL (ref 74–99)
METER GLUCOSE: 115 MG/DL (ref 74–99)
METER GLUCOSE: 138 MG/DL (ref 74–99)
METER GLUCOSE: 147 MG/DL (ref 74–99)
PDW BLD-RTO: 15.4 FL (ref 11.5–15)
PLATELET # BLD: 338 E9/L (ref 130–450)
PMV BLD AUTO: 11 FL (ref 7–12)
POTASSIUM SERPL-SCNC: 3.9 MMOL/L (ref 3.5–5)
RBC # BLD: 3.57 E12/L (ref 3.5–5.5)
SODIUM BLD-SCNC: 141 MMOL/L (ref 132–146)
WBC # BLD: 10.5 E9/L (ref 4.5–11.5)

## 2021-06-16 PROCEDURE — 82962 GLUCOSE BLOOD TEST: CPT

## 2021-06-16 PROCEDURE — 94640 AIRWAY INHALATION TREATMENT: CPT

## 2021-06-16 PROCEDURE — 2580000003 HC RX 258: Performed by: THORACIC SURGERY (CARDIOTHORACIC VASCULAR SURGERY)

## 2021-06-16 PROCEDURE — 92611 MOTION FLUOROSCOPY/SWALLOW: CPT

## 2021-06-16 PROCEDURE — 6370000000 HC RX 637 (ALT 250 FOR IP): Performed by: THORACIC SURGERY (CARDIOTHORACIC VASCULAR SURGERY)

## 2021-06-16 PROCEDURE — 93798 PHYS/QHP OP CAR RHAB W/ECG: CPT

## 2021-06-16 PROCEDURE — 92526 ORAL FUNCTION THERAPY: CPT

## 2021-06-16 PROCEDURE — 2500000003 HC RX 250 WO HCPCS: Performed by: INTERNAL MEDICINE

## 2021-06-16 PROCEDURE — 6360000002 HC RX W HCPCS: Performed by: PHYSICIAN ASSISTANT

## 2021-06-16 PROCEDURE — 74230 X-RAY XM SWLNG FUNCJ C+: CPT

## 2021-06-16 PROCEDURE — 36415 COLL VENOUS BLD VENIPUNCTURE: CPT

## 2021-06-16 PROCEDURE — 6370000000 HC RX 637 (ALT 250 FOR IP): Performed by: PHYSICIAN ASSISTANT

## 2021-06-16 PROCEDURE — 6370000000 HC RX 637 (ALT 250 FOR IP): Performed by: INTERNAL MEDICINE

## 2021-06-16 PROCEDURE — 2500000003 HC RX 250 WO HCPCS: Performed by: RADIOLOGY

## 2021-06-16 PROCEDURE — 2700000000 HC OXYGEN THERAPY PER DAY

## 2021-06-16 PROCEDURE — 85027 COMPLETE CBC AUTOMATED: CPT

## 2021-06-16 PROCEDURE — 80048 BASIC METABOLIC PNL TOTAL CA: CPT

## 2021-06-16 PROCEDURE — 2140000000 HC CCU INTERMEDIATE R&B

## 2021-06-16 RX ORDER — BISACODYL 10 MG
10 SUPPOSITORY, RECTAL RECTAL DAILY PRN
Status: DISCONTINUED | OUTPATIENT
Start: 2021-06-16 | End: 2021-06-17 | Stop reason: HOSPADM

## 2021-06-16 RX ADMIN — BARIUM SULFATE 15 ML: 400 SUSPENSION ORAL at 13:58

## 2021-06-16 RX ADMIN — POTASSIUM CHLORIDE 20 MEQ: 1500 TABLET, EXTENDED RELEASE ORAL at 08:16

## 2021-06-16 RX ADMIN — BARIUM SULFATE 15 ML: 400 PASTE ORAL at 13:58

## 2021-06-16 RX ADMIN — BUMETANIDE 1 MG: 0.25 INJECTION INTRAMUSCULAR; INTRAVENOUS at 08:16

## 2021-06-16 RX ADMIN — Medication 10 ML: at 21:10

## 2021-06-16 RX ADMIN — METOPROLOL TARTRATE 12.5 MG: 25 TABLET, FILM COATED ORAL at 21:09

## 2021-06-16 RX ADMIN — OXYCODONE HYDROCHLORIDE AND ACETAMINOPHEN 500 MG: 500 TABLET ORAL at 08:16

## 2021-06-16 RX ADMIN — FERROUS SULFATE TAB 325 MG (65 MG ELEMENTAL FE) 325 MG: 325 (65 FE) TAB at 17:18

## 2021-06-16 RX ADMIN — BARIUM SULFATE 15 ML: 0.81 POWDER, FOR SUSPENSION ORAL at 13:58

## 2021-06-16 RX ADMIN — OXYCODONE HYDROCHLORIDE AND ACETAMINOPHEN 500 MG: 500 TABLET ORAL at 21:09

## 2021-06-16 RX ADMIN — BUMETANIDE 1 MG: 0.25 INJECTION INTRAMUSCULAR; INTRAVENOUS at 21:10

## 2021-06-16 RX ADMIN — FOLIC ACID 1 MG: 1 TABLET ORAL at 08:16

## 2021-06-16 RX ADMIN — PANTOPRAZOLE SODIUM 40 MG: 40 TABLET, DELAYED RELEASE ORAL at 08:16

## 2021-06-16 RX ADMIN — ENOXAPARIN SODIUM 40 MG: 40 INJECTION SUBCUTANEOUS at 08:17

## 2021-06-16 RX ADMIN — CLOPIDOGREL 75 MG: 75 TABLET, FILM COATED ORAL at 08:16

## 2021-06-16 RX ADMIN — FERROUS SULFATE TAB 325 MG (65 MG ELEMENTAL FE) 325 MG: 325 (65 FE) TAB at 08:15

## 2021-06-16 RX ADMIN — ASPIRIN 81 MG: 81 TABLET, COATED ORAL at 08:16

## 2021-06-16 RX ADMIN — DOCUSATE SODIUM 50 MG AND SENNOSIDES 8.6 MG 1 TABLET: 8.6; 5 TABLET, FILM COATED ORAL at 21:09

## 2021-06-16 RX ADMIN — METOPROLOL TARTRATE 12.5 MG: 25 TABLET, FILM COATED ORAL at 08:16

## 2021-06-16 RX ADMIN — IPRATROPIUM BROMIDE AND ALBUTEROL SULFATE 1 AMPULE: 2.5; .5 SOLUTION RESPIRATORY (INHALATION) at 16:56

## 2021-06-16 RX ADMIN — Medication 10 ML: at 08:17

## 2021-06-16 RX ADMIN — ATORVASTATIN CALCIUM 20 MG: 20 TABLET, FILM COATED ORAL at 21:09

## 2021-06-16 RX ADMIN — FAMOTIDINE 20 MG: 20 TABLET ORAL at 08:16

## 2021-06-16 RX ADMIN — Medication 400 MG: at 08:16

## 2021-06-16 RX ADMIN — IPRATROPIUM BROMIDE AND ALBUTEROL SULFATE 1 AMPULE: 2.5; .5 SOLUTION RESPIRATORY (INHALATION) at 20:23

## 2021-06-16 RX ADMIN — INSULIN LISPRO 1 UNITS: 100 INJECTION, SOLUTION INTRAVENOUS; SUBCUTANEOUS at 11:37

## 2021-06-16 RX ADMIN — IPRATROPIUM BROMIDE AND ALBUTEROL SULFATE 1 AMPULE: 2.5; .5 SOLUTION RESPIRATORY (INHALATION) at 08:51

## 2021-06-16 ASSESSMENT — PAIN SCALES - GENERAL
PAINLEVEL_OUTOF10: 0
PAINLEVEL_OUTOF10: 0

## 2021-06-16 NOTE — PROGRESS NOTES
POD#7 Awake, alert. No complaints. Up in chair. Denies CP, palpitations, SOB at rest, dizziness/lightheadedness. Vitals:    06/16/21 0744 06/16/21 0745 06/16/21 0827 06/16/21 0852   BP:   (!) 106/54    Pulse:   95    Resp:   20    Temp:   97.6 °F (36.4 °C)    TempSrc:   Oral    SpO2: (!) 87% 91% 95% 96%   Weight:       Height:         O2: 1L/NC      Intake/Output Summary (Last 24 hours) at 6/16/2021 0925  Last data filed at 6/16/2021 0447  Gross per 24 hour   Intake 556 ml   Output 1425 ml   Net -869 ml         +BM on 6/15    UO: Voids without difficulty      Recent Labs     06/14/21  0519 06/15/21  0528 06/16/21  0619   WBC 8.4 9.1 10.5   HGB 9.8* 10.0* 10.5*   HCT 30.3* 31.3* 33.0*    283 338      Recent Labs     06/14/21  0519 06/15/21  0528 06/16/21 0619   BUN 35* 32* 28*   CREATININE 0.8 0.8 0.9         Telemetry: SR        PE  Cardiac: RRR  Lungs: decreased bases  Chest incision with C/D/I, approximated, no erythema, mild swelling proximal to incision, intact staples. Sternum stable. Prior chest tube site incisions C/D/I, no erythema with intact sutures. Abd: Soft, nontender, +BS  Ext: MART, +trace BLE edema           A/P: POD# 7     1. CAD  --Stable s/p CABG x 4 ( cryo vein) s/p Exploration of mediastinum, Control of hemorrhage (small branch of left subclavian vein) on 6/9/2021  --Post op AVELINA revealed normal biventricular function, no valvular abnormalities  --Scr stable at 0.9  --DAPT/statin- Cont plavix x 6 weeks due to smaller size of LIMA   --reinforce sternal precautions  --All tubes and wires have been removed  --empiric zosyn secondary to emergent chest re-exploration - received 11 doses total/5days       2. Acute blood loss anemia secondary to open heart surgery  --stable- hgb 10.5        3. NSR  -- BP stable, tolerating low dose BB  -- will cont to monitor          4.  Prolonged postoperative respiratory insufficiency  --wean oxygen to keep SpO2 greater than or equal to 92%  --continue duonebs with ezpap  --encourage C&DB, SMI  --currently on 1L O2/NC at 96%  --pulm on board- appreciate pulm input  --On bumex 1mg IV BID per cardiology         5. Dysphagia  - complains of difficulty swallowing \" feels like she is chocking every time she tries to swallow pills \"--states this is improving  - Swallow study conducted 6/14 - \"Inconsistent signs of aspiration noted\" - recommended pureed consistency solids with thin liquids as well as MBS  --MBS ordered today         6. Post operative deconditioning  --Increase activity as tolerated  --PT/OT       7. Constipation  --resolved  --decrease bowel regimen  --Encouraged continued increase in oral intake and activity.           DVT prophylaxis:  --continue bilateral knee high MARIA TERESA hose  --continue PCDs  --continue progressive ambulation  --Continue lovenox for dvt prophylaxis and continue knee high MARIA TERESA hose/pcds/progressive ambulation        Dispo: home vs. Rehab pending progression in activity level ---likely rehab as soon as tomorrow        This patient's case and care plan was discussed with the attending surgeon

## 2021-06-16 NOTE — PROGRESS NOTES
Judge Arthur M.D.,[facility]  Alcides Lee D.O., F.A.C.O.I., Alfa Ortega M.D. Kraig Kern M.D., Jemma Monroe M.D. Ba Marin D.O. Daily Pulmonary Progress Note    Patient:  Phuc Rodriguez 78 y.o. female MRN: [id number]     Date of Service: 6/16/2021      Synopsis     We are following patient for hypoxia     \"CC\" chest pain     Code status: full code       Subjective      Patient was seen and examined. Lying in bed in no acute distress. Mild dyspnea at rest.  Oxygen weaned to 1 L nasal cannula. Progressing with therapy. Occasional cough no mucus. Remains weak overall, progressing slowly with therapy. Video swallow today normal, no evidence of aspiration.      Objective   Vitals: BP (!) 100/54   Pulse 89   Temp 98.6 °F (37 °C) (Oral)   Resp 18   Ht 5' 1.5\" (1.562 m)   Wt 101 lb (45.8 kg)   SpO2 96%   BMI 18.77 kg/m²     I/O:    Intake/Output Summary (Last 24 hours) at 6/16/2021 1545  Last data filed at 6/16/2021 1348  Gross per 24 hour   Intake 760 ml   Output 925 ml   Net -165 ml       Vent Information  $Ventilation: Off Vent  Skin Assessment: Clean, dry, & intact  Equipment ID: [id number]  Vent Type: [id number]  Vent Mode: (S) PS  Vt Ordered: 0 mL  Rate Set: 0 bmp  Peak Flow: 0 L/min  Pressure Support: 10 cmH20  FiO2 : 60 %  SpO2: 96 %  SpO2/FiO2 ratio: 163.33  Sensitivity: 2  PEEP/CPAP: 5  I Time/ I Time %: 0.9 s  Humidification Source: Heated wire  Humidification Temp: 37  Humidification Temp Measured: 36.7  Circuit Condensation: Not drained  Mask Type: Full face mask  Mask Size: Small       IPAP: 10 cmH20  CPAP/EPAP: 7 cmH2O     CURRENT MEDS :  Scheduled Meds:   metoprolol tartrate  12.5 mg Oral BID    bumetanide  1 mg Intravenous BID    atorvastatin  20 mg Oral Nightly    enoxaparin  40 mg Subcutaneous Daily    aspirin  81 mg Oral Daily    sennosides-docusate sodium  1 tablet Oral BID    ferrous sulfate  325 mg Oral BID WC    vitamin C  500 mg Oral BID    folic acid  1 mg Oral Daily    insulin lispro  0-6 Units Subcutaneous TID     insulin lispro  0-3 Units Subcutaneous Nightly    sodium chloride flush  10 mL Intravenous 2 times per day    magnesium oxide  400 mg Oral Daily    pantoprazole  40 mg Oral Daily    ipratropium-albuterol  1 ampule Inhalation Q4H WA    clopidogrel  75 mg Oral Daily    famotidine  20 mg Oral Daily       Physical Exam:  General Appearance: appears comfortable in no acute distress. HEENT: Normocephalic atraumatic without obvious abnormality   Neck: Lips, mucosa, and tongue normal.  Supple, symmetrical, trachea midline, no adenopathy;thyroid:  no enlargement/tenderness/nodules or JVD. Lung: Breath sounds diminished bilaterally few basilar crackles. Respirations   unlabored. Symmetrical expansion. Heart: RRR, normal S1, S2. No MRG  Abdomen: Soft, NT, ND. BS present x 4 quadrants. No bruit or organomegaly. Extremities: Pedal pulses 2+ symmetric b/l. Extremities normal, no cyanosis, clubbing, or edema. Musculokeletal: No joint swelling, no muscle tenderness. ROM normal in all joints of extremities. Neurologic: Mental status: Alert and Oriented X3 . Pertinent/ New Labs and Imaging Studies     Imaging Personally Reviewed:  cxr 6/14  1. No interval change in the bibasilar airspace disease and small bilateral   pleural effusions. 2. There is no right or left pneumothorax.      Labs:  Lab Results   Component Value Date    WBC 10.5 06/16/2021    HGB 10.5 06/16/2021    HCT 33.0 06/16/2021    MCV 92.4 06/16/2021    MCH 29.4 06/16/2021    MCHC 31.8 06/16/2021    RDW 15.4 06/16/2021     06/16/2021    MPV 11.0 06/16/2021     Lab Results   Component Value Date     06/16/2021    K 3.9 06/16/2021    K 4.1 06/08/2021     06/16/2021    CO2 31 06/16/2021    BUN 28 06/16/2021    CREATININE 0.9 06/16/2021    LABALBU 4.2 06/08/2021    CALCIUM 9.5 06/16/2021    GFRAA >60 06/16/2021    LABGLOM >60 06/16/2021     Lab Results Component Value Date    PROTIME 15.6 06/09/2021    INR 1.4 06/09/2021     No results for input(s): PROBNP in the last 72 hours. No results for input(s): PROCAL in the last 72 hours. This SmartLink has not been configured with any valid records. Assessment:    1. Post operative insufficiency With hypoxia  2. CABG x 4 vessel  3. Bilateral pleural effusions  4. Basilar atelectasis  5. Mild copd Stage I not in exacerbation  6. Volume overload   7. Anemia        Plan:     1. Wean o2 as tolerated 1 L nasal cannula keep >92%  2. Refusing BiPAP  3. Continue with diuresis Bumex 1 mg IV twice daily strict intake output recorded  4. Cxr 6/14/2021 bilateral effusions, atelectasis, repeat in am  5. EZ Pap 3 times daily, and Xopenex prn, Incentive Spirometer  6. Dvt/gi prophylaxis   7. Swallow study recommending puréed diet with thin liquids, speech therapy following. 8. PT OT working with patient. Original [facility] 16/24. Continues to improve. Likely MARK upon dc  Plan of care reviewed in collaboration with Dr. Haley Glez  Electronically signed by NOMAN Knight - CNP on 6/16/2021 at 3:47 PM        I personally saw, examined, and cared for the patient. Labs, medications, radiographs reviewed. I agree with history exam and plans detailed in NP note.   Lawanda Jones MD

## 2021-06-16 NOTE — PROGRESS NOTES
DAILY PROGRESS NOTE - [facility]    SUBJECTIVE:    She is being followed for non-STEMI and multivessel CAD post CABG utilizing LIMA-LAD, veins to ramus, OM, PDA. Preserved left ventricular systolic function. Currently sitting in bedside chair and having difficulty with swallowing. Mild dyspnea with exertion. On IV diuretic and now around 1.5 L negative over the last 2 days. Cumulative over 2.5 L positive. Hypertension, hyperlipidemia, diabetes, COPD. OBJECTIVE:    Her vital signs were reviewed today. Vitals:    06/16/21 0700   BP: (!) 101/56   Pulse: 92   Resp: 16   Temp: 97.1 °F (36.2 °C)   SpO2: 93%       Scheduled Meds:   metoprolol tartrate  12.5 mg Oral BID    bumetanide  1 mg Intravenous BID    atorvastatin  20 mg Oral Nightly    enoxaparin  40 mg Subcutaneous Daily    aspirin  81 mg Oral Daily    bisacodyl  5 mg Oral Daily    sennosides-docusate sodium  1 tablet Oral BID    magnesium hydroxide  30 mL Oral Daily    ferrous sulfate  325 mg Oral BID WC    vitamin C  500 mg Oral BID    folic acid  1 mg Oral Daily    insulin lispro  0-6 Units Subcutaneous TID WC    insulin lispro  0-3 Units Subcutaneous Nightly    bisacodyl  10 mg Rectal Daily    sodium chloride flush  10 mL Intravenous 2 times per day    magnesium oxide  400 mg Oral Daily    pantoprazole  40 mg Oral Daily    ipratropium-albuterol  1 ampule Inhalation Q4H WA    clopidogrel  75 mg Oral Daily    famotidine  20 mg Oral Daily     Continuous Infusions:    PRN Meds:.levalbuterol, sodium chloride, acetaminophen, HYDROcodone 5 mg - acetaminophen **OR** HYDROcodone 5 mg - acetaminophen, potassium chloride, ondansetron, diphenhydrAMINE, sodium chloride flush    REVIEW OF SYSTEMS:     No pruritus, rash, bruising. Cardiac symptoms per HPI. No nausea, vomiting, abdominal pain, GI bleeding, change in bowel habits. No dysuria, urinary frequency, urgency, hematuria, flank pain.   Joint pain but no muscle soreness, stiffness, aching. No headache, speech disturbance, lateralizing neurologic deficit. No hemoptysis, epistaxis, easy bruising. No anxiety, depression. No symptoms of hypothyroidism, hyperthyroidism, diabetes, heat or cold intolerance. FAMILY HISTORY: Negative for CAD in first-degree relatives. SOCIAL HISTORY: Negative for alcohol, tobacco, or illicit drug use. PHYSICAL EXAM:    General Appearance:  awake, alert, oriented, in no acute distress  Neck:  no bruits  Lungs:  Normal expansion. Decreased breath sounds at bases to auscultation. No rales, rhonchi, or wheezing. Heart:  Heart sounds are normal.  Regular rate and rhythm without murmur, gallop or rub. Abdomen:  Soft, non-tender. Extremities: Extremities warm to touch, pink, with no edema. Neuro/musculosketal:  Unremarkable. LABS:    Recent Labs     06/14/21  0519 06/15/21  0528    143   CREATININE 0.8 0.8       Recent Labs     06/14/21  0519 06/15/21  0528 06/16/21  0619   HGB 9.8* 10.0* 10.5*       No results for input(s): INR in the last 72 hours. IMPRESSION:    #1. CAD post CABG-stable and doing well. On both aspirin and clopidogrel. Per CT surgery, clopidogrel to be continued for 6 weeks because of smaller LIMA size. #2. Hypertension-blood pressure controlled and no new antihypertensives added at this time. #3. Hyperlipidemia-continue indefinite statin. #4.  Diabetes-per intensivist.    #5.  Acute diastolic heart failure-potentially due to perioperative fluid administration. Lung sounds are diminished. She is 1.5 L negative over the last 48 hours. Have continued IV bumetanide at this time. Strict urine output and daily weight. 1.5 L/day fluid maximum. #6.  Continue to follow.

## 2021-06-16 NOTE — PROGRESS NOTES
SPEECH/LANGUAGE PATHOLOGY  VIDEOFLUOROSCOPIC STUDY OF SWALLOWING (MBS)   and PLAN OF CARE    PATIENT NAME:  Andrea Herzog  (female)     MRN:  [id number]    :  1950  (78 y.o.)  STATUS:  Inpatient: Room 6511/6511-A    TODAY'S DATE:  2021  REFERRING PROVIDER: NOMAN Mckeon CNP   SPECIFIC PROVIDER ORDER: FL modified barium swallow with video  Date of order:  21   REASON FOR REFERRAL: dysphagia   EVALUATING THERAPIST: TASIA Resendiz      RESULTS:      DYSPHAGIA DIAGNOSIS:  normal swallow function     DIET RECOMMENDATIONS:  Regular consistency solids with  thin liquids    FEEDING RECOMMENDATIONS:    Assistance level:  No assistance needed     Compensatory strategies recommended: No strategies are recommended at this time     Discussed recommendations with nursing and/or faxed report to referring provider: Yes    SPEECH THERAPY  PLAN OF CARE   The dysphagia POC is established based on physician order and dysphagia diagnosis    Dysphagia therapy is not recommended       Conditions Requiring Skilled Therapeutic Intervention for dysphagia:    not applicable    SPECIFIC DYSPHAGIA INTERVENTIONS TO INCLUDE:     Not applicable    Specific instructions for next treatment:  not applicable   Treatment Goals:    Short Term Goals:  Not applicable no therapy warranted     Long Term Goals:   Not applicable no therapy warranted      Patient/family Goal:    not applicable                    ADMITTING DIAGNOSIS: CAD in native artery [I25.10]     VISIT DIAGNOSIS:         PATIENT REPORT/COMPLAINT: difficulty initiating a swallow    PRIOR LEVEL OF SWALLOW FUNCTION:    Past History of Dysphagia?:  none reported    Diet during hospital admission: Pureed consistency solids (dysphagia 1) with thin liquids    PROCEDURE:  Consistencies Administered During the Evaluation   Liquids: thin liquid and nectar thick liquid   Solids:  pureed foods and solid foods      Method of Intake:   cup, straw, spoon  Self fed, Fed by clinician      Position:   Seated, upright, Lateral plane    CLINICAL ASSESSMENT:    ORAL PREPARATION PHASE:    Within functional limits    ORAL PHASE:   Within functional limits    PHARYNGEAL PHASE:     ONSET TIME       Onset time of the pharyngeal swallow was adequate       PHARYNGEAL RESIDUALS        Vallecula/Pharyngeal Wall           No significant residuals were noted in the vallecula      Pyriform Sinuses      No significant residuals were noted in the pyriform sinuses     LARYNGEAL PENETRATION   Laryngeal penetration was not present during this evaluation    ASPIRATION  Aspiration was not present during this evaluation    PENETRATION-ASPIRATION SCALE (PAS):  THIN 1 = Material does not enter the airway  MILDLY THICK 1 = Material does not enter the airway  MODERATELY THICK item not administered  PUREE 1 = Material does not enter the airway  HARD SOLID 1 = Material does not enter the airway       COMPENSATORY STRATEGIES    Compensatory strategies were not attempted      STRUCTURAL/FUNCTIONAL ANOMALIES   No structural/functional anomalies were noted    CERVICAL ESOPHAGEAL STAGE :     Was not assessed          ___________    Cognition:   Within functional limits for this exam    Oral Peripheral Examination   Adequate lingual/labial strength     Current Respiratory Status   1 liters nasal cannula     Parameters of Speech Production  Respiration:  Adequate for speech production  Quality:   Within functional limits  Intensity: Within functional limits    Pain: No pain reported. EDUCATION:   The Speech Language Pathologist (SLP) completed education regarding results of evaluation and that intervention is warranted at this time. Learner: Patient  Education: Reviewed results and recommendations of this evaluation  Evaluation of Education:  Anahi understanding    This plan may be re-evaluated and revised as warranted.         Evaluation Time includes thorough review of current medical information, gathering information on past medical history/social history and prior level of function, completion of standardized testing/informal observation of tasks, assessment of data and education on plan of care and goals. [x]The admitting diagnosis and active problem list, have been reviewed prior to initiation of this evaluation.     CPT Code: 12525  dysphagia study    ACTIVE PROBLEM LIST:   Patient Active Problem List   Diagnosis    CAD in native artery    Postprocedural hypotension    Acute pulmonary insufficiency    Acute blood loss anemia    Hypoxia

## 2021-06-16 NOTE — PROGRESS NOTES
[facility] Quality Flow/Interdisciplinary Rounds Progress Note        Quality Flow Rounds held on June 16, 2021    Disciplines Attending:  Bedside Nurse, ,  and Nursing Unit Leadership    Alyson Olszewski was admitted on 6/7/2021  1:23 PM    Anticipated Discharge Date:  Expected Discharge Date: 06/17/21    Disposition:    Ady Score:  Ady Scale Score: 18    Readmission Risk              Risk of Unplanned Readmission:  8           Discussed patient goal for the day, patient clinical progression, and barriers to discharge.   The following Goal(s) of the Day/Commitment(s) have been identified:  promote sternal precautions, increase ambulation, OOB for all meals      James Gan RN  June 16, 2021

## 2021-06-17 ENCOUNTER — APPOINTMENT (OUTPATIENT)
Dept: GENERAL RADIOLOGY | Age: 71
DRG: 233 | End: 2021-06-17
Attending: FAMILY MEDICINE
Payer: MEDICARE

## 2021-06-17 VITALS
DIASTOLIC BLOOD PRESSURE: 53 MMHG | HEIGHT: 62 IN | OXYGEN SATURATION: 95 % | BODY MASS INDEX: 18.07 KG/M2 | RESPIRATION RATE: 16 BRPM | TEMPERATURE: 97.9 F | HEART RATE: 92 BPM | SYSTOLIC BLOOD PRESSURE: 98 MMHG | WEIGHT: 98.2 LBS

## 2021-06-17 LAB
ANION GAP SERPL CALCULATED.3IONS-SCNC: 12 MMOL/L (ref 7–16)
BUN BLDV-MCNC: 25 MG/DL (ref 6–23)
CALCIUM SERPL-MCNC: 9.8 MG/DL (ref 8.6–10.2)
CHLORIDE BLD-SCNC: 99 MMOL/L (ref 98–107)
CO2: 29 MMOL/L (ref 22–29)
CREAT SERPL-MCNC: 0.8 MG/DL (ref 0.5–1)
GFR AFRICAN AMERICAN: >60
GFR NON-AFRICAN AMERICAN: >60 ML/MIN/1.73
GLUCOSE BLD-MCNC: 132 MG/DL (ref 74–99)
HCT VFR BLD CALC: 33.9 % (ref 34–48)
HEMOGLOBIN: 10.9 G/DL (ref 11.5–15.5)
MCH RBC QN AUTO: 28.9 PG (ref 26–35)
MCHC RBC AUTO-ENTMCNC: 32.2 % (ref 32–34.5)
MCV RBC AUTO: 89.9 FL (ref 80–99.9)
METER GLUCOSE: 123 MG/DL (ref 74–99)
METER GLUCOSE: 126 MG/DL (ref 74–99)
PDW BLD-RTO: 15.5 FL (ref 11.5–15)
PLATELET # BLD: 415 E9/L (ref 130–450)
PMV BLD AUTO: 10.8 FL (ref 7–12)
POTASSIUM SERPL-SCNC: 3.8 MMOL/L (ref 3.5–5)
RBC # BLD: 3.77 E12/L (ref 3.5–5.5)
SODIUM BLD-SCNC: 140 MMOL/L (ref 132–146)
WBC # BLD: 12.2 E9/L (ref 4.5–11.5)

## 2021-06-17 PROCEDURE — 6370000000 HC RX 637 (ALT 250 FOR IP): Performed by: THORACIC SURGERY (CARDIOTHORACIC VASCULAR SURGERY)

## 2021-06-17 PROCEDURE — 93798 PHYS/QHP OP CAR RHAB W/ECG: CPT

## 2021-06-17 PROCEDURE — 6360000002 HC RX W HCPCS: Performed by: PHYSICIAN ASSISTANT

## 2021-06-17 PROCEDURE — 2580000003 HC RX 258: Performed by: THORACIC SURGERY (CARDIOTHORACIC VASCULAR SURGERY)

## 2021-06-17 PROCEDURE — 94640 AIRWAY INHALATION TREATMENT: CPT

## 2021-06-17 PROCEDURE — 82962 GLUCOSE BLOOD TEST: CPT

## 2021-06-17 PROCEDURE — 36415 COLL VENOUS BLD VENIPUNCTURE: CPT

## 2021-06-17 PROCEDURE — 85027 COMPLETE CBC AUTOMATED: CPT

## 2021-06-17 PROCEDURE — 71045 X-RAY EXAM CHEST 1 VIEW: CPT

## 2021-06-17 PROCEDURE — 6370000000 HC RX 637 (ALT 250 FOR IP): Performed by: PHYSICIAN ASSISTANT

## 2021-06-17 PROCEDURE — 80048 BASIC METABOLIC PNL TOTAL CA: CPT

## 2021-06-17 PROCEDURE — 2500000003 HC RX 250 WO HCPCS: Performed by: INTERNAL MEDICINE

## 2021-06-17 RX ORDER — AMOXICILLIN AND CLAVULANATE POTASSIUM 875; 125 MG/1; MG/1
1 TABLET, FILM COATED ORAL 2 TIMES DAILY
Qty: 14 TABLET | Refills: 0
Start: 2021-06-17 | End: 2021-06-24

## 2021-06-17 RX ORDER — CLOPIDOGREL BISULFATE 75 MG/1
75 TABLET ORAL DAILY
Qty: 30 TABLET | Refills: 1
Start: 2021-06-18 | End: 2021-07-27 | Stop reason: SDUPTHER

## 2021-06-17 RX ORDER — FERROUS SULFATE 325(65) MG
325 TABLET ORAL 2 TIMES DAILY WITH MEALS
Qty: 60 TABLET | Refills: 0
Start: 2021-06-17 | End: 2021-07-27

## 2021-06-17 RX ORDER — FOLIC ACID 1 MG/1
1 TABLET ORAL DAILY
Qty: 30 TABLET | Refills: 0
Start: 2021-06-18 | End: 2021-07-27 | Stop reason: SDUPTHER

## 2021-06-17 RX ORDER — HYDROCODONE BITARTRATE AND ACETAMINOPHEN 5; 325 MG/1; MG/1
1 TABLET ORAL EVERY 4 HOURS PRN
Qty: 42 TABLET | Refills: 0 | Status: SHIPPED | OUTPATIENT
Start: 2021-06-17 | End: 2021-06-24

## 2021-06-17 RX ORDER — DOCUSATE SODIUM 100 MG/1
100 CAPSULE, LIQUID FILLED ORAL 2 TIMES DAILY PRN
Qty: 60 CAPSULE | Refills: 0
Start: 2021-06-17 | End: 2021-07-17

## 2021-06-17 RX ORDER — LEVALBUTEROL INHALATION SOLUTION 0.63 MG/3ML
0.63 SOLUTION RESPIRATORY (INHALATION) EVERY 8 HOURS PRN
DISCHARGE
Start: 2021-06-17 | End: 2021-07-27

## 2021-06-17 RX ORDER — ASCORBIC ACID 500 MG
500 TABLET ORAL 2 TIMES DAILY
Qty: 60 TABLET | Refills: 0
Start: 2021-06-17 | End: 2021-07-27

## 2021-06-17 RX ORDER — POTASSIUM CHLORIDE 750 MG/1
10 TABLET, EXTENDED RELEASE ORAL EVERY OTHER DAY
Qty: 7 TABLET | Refills: 0
Start: 2021-06-17 | End: 2021-07-27

## 2021-06-17 RX ORDER — BUMETANIDE 2 MG/1
1 TABLET ORAL DAILY
Qty: 30 TABLET | Refills: 3
Start: 2021-06-17 | End: 2021-07-27 | Stop reason: SDUPTHER

## 2021-06-17 RX ADMIN — ENOXAPARIN SODIUM 40 MG: 40 INJECTION SUBCUTANEOUS at 08:15

## 2021-06-17 RX ADMIN — POTASSIUM CHLORIDE 40 MEQ: 1500 TABLET, EXTENDED RELEASE ORAL at 08:14

## 2021-06-17 RX ADMIN — BUMETANIDE 1 MG: 0.25 INJECTION INTRAMUSCULAR; INTRAVENOUS at 08:29

## 2021-06-17 RX ADMIN — OXYCODONE HYDROCHLORIDE AND ACETAMINOPHEN 500 MG: 500 TABLET ORAL at 08:13

## 2021-06-17 RX ADMIN — DOCUSATE SODIUM 50 MG AND SENNOSIDES 8.6 MG 1 TABLET: 8.6; 5 TABLET, FILM COATED ORAL at 08:13

## 2021-06-17 RX ADMIN — SALINE NASAL SPRAY 1 SPRAY: 1.5 SOLUTION NASAL at 08:17

## 2021-06-17 RX ADMIN — CLOPIDOGREL 75 MG: 75 TABLET, FILM COATED ORAL at 08:13

## 2021-06-17 RX ADMIN — METOPROLOL TARTRATE 12.5 MG: 25 TABLET, FILM COATED ORAL at 08:12

## 2021-06-17 RX ADMIN — IPRATROPIUM BROMIDE AND ALBUTEROL SULFATE 1 AMPULE: 2.5; .5 SOLUTION RESPIRATORY (INHALATION) at 10:36

## 2021-06-17 RX ADMIN — PANTOPRAZOLE SODIUM 40 MG: 40 TABLET, DELAYED RELEASE ORAL at 08:13

## 2021-06-17 RX ADMIN — Medication 10 ML: at 08:15

## 2021-06-17 RX ADMIN — FOLIC ACID 1 MG: 1 TABLET ORAL at 08:13

## 2021-06-17 RX ADMIN — FERROUS SULFATE TAB 325 MG (65 MG ELEMENTAL FE) 325 MG: 325 (65 FE) TAB at 08:13

## 2021-06-17 RX ADMIN — Medication 400 MG: at 08:13

## 2021-06-17 RX ADMIN — FAMOTIDINE 20 MG: 20 TABLET ORAL at 08:13

## 2021-06-17 RX ADMIN — ASPIRIN 81 MG: 81 TABLET, COATED ORAL at 08:13

## 2021-06-17 ASSESSMENT — PAIN SCALES - GENERAL
PAINLEVEL_OUTOF10: 0
PAINLEVEL_OUTOF10: 0

## 2021-06-17 NOTE — CARE COORDINATION
SOCIAL WORK/CASEMANAGEMENT TRANSITION OF CARE [id number] [facility], [address] [facility], [phone]): [facility]. I met with pt and spouse and they want #1 [facility], called and never received a call back, #2 [facility]- has accepted pt and has setup up kelco ambulette for 1 p.m. exempt done with deborah and the cardiac rehab protocol is in the envelope to go with pt. Snf/loc.  MU Vee  6/17/2021

## 2021-06-17 NOTE — DISCHARGE INSTR - COC
Continuity of Care Form    Patient Name: David Alegre   :  1950  MRN:  [id number]    Admit date:  2021  Discharge date:  21    Code Status Order: Prior   Advance Directives:   [facility] Documentation       Date/Time Healthcare Directive Type of Healthcare Directive Copy in [facility] [address] Agent's Name Healthcare Agent's Phone Number    21 1102  No, patient does not have an advance directive for healthcare treatment -- -- -- -- --            Admitting Physician:  Denise Montana DO  PCP: No primary care provider on file. Discharging Nurse: Therese 23 Unit/Room#: 1844/4545-Q  Discharging Unit Phone Number: [phone]    Emergency Contact:   Extended Emergency Contact Information  Primary Emergency Contact: Cruce [address] Phone: [phone]  Relation: Spouse    Past Surgical History:  Past Surgical History:   Procedure Laterality Date    CARDIAC SURGERY N/A 2021    HEART POST OP HEMORRHAGE CONTROL performed by Denise Montana DO at [facility] N/A 2021    [facility] Den[facility] [address]       Immunization History: There is no immunization history on file for this patient.     Active Problems:  Patient Active Problem List   Diagnosis Code    CAD in native artery I25.10    Postprocedural hypotension I95.81    Acute pulmonary insufficiency J98.4    Acute blood loss anemia D62    Hypoxia R09.02       Isolation/Infection:   Isolation            No Isolation          Patient Infection Status       None to display            Nurse Assessment:  Last Vital Signs: /65   Pulse 93   Temp 98.7 °F (37.1 °C) (Temporal)   Resp 18   Ht 5' 1.5\" (1.562 m)   Wt 98 lb 3.2 oz (44.5 kg)   SpO2 90% Comment: 90% lowest while walking, start 93%, end at 92% RA/1L 94%  BMI 18.25 kg/m²     Last documented pain score (0-10 scale): Pain Level: 0  Last Weight:   Wt Readings from Last 1 Encounters:   06/17/21 98 lb 3.2 oz (44.5 kg)     Mental Status:  oriented    IV Access:  - None    Nursing Mobility/ADLs:  Walking   Assisted  Transfer  Assisted  Bathing  Assisted  Dressing  Assisted  Toileting  Assisted  Feeding  Assisted  Med Admin  Assisted  Med Delivery   whole/ crush larger pills/ takes some with applesauce    Wound Care Documentation and Therapy:        Elimination:  Continence:   · Bowel: No  · Bladder: No  Urinary Catheter: None   Colostomy/Ileostomy/Ileal Conduit: No       Date of Last BM: 6/17    Intake/Output Summary (Last 24 hours) at 6/17/2021 0904  Last data filed at 6/17/2021 0515  Gross per 24 hour   Intake 120 ml   Output 1000 ml   Net -880 ml     I/O last 3 completed shifts: In: 300 [P.O.:300]  Out: 1000 [Urine:1000]    Safety Concerns: At Risk for Falls Aspiration Risk     Impairments/Disabilities:      None    Nutrition Therapy:  Current Nutrition Therapy:   - Oral Diet:  Carb Control 3 carbs/meal (1500kcals/day)    Routes of Feeding: Oral  Liquids: Thin Liquids  Daily Fluid Restriction: yes - amount 1500ml  Barium swallow: 6/16    Treatments at the Time of Hospital Discharge:   Respiratory Treatments: See [facility]  Oxygen Therapy:  is not on home oxygen therapy.   Ventilator:    - No ventilator support    Rehab Therapies: PT and OT to eval and treat   Weight Bearing Status/Restrictions: No weight bearing restirctions sternal precautions  Other Medical Equipment (for information only, NOT a DME order):  none  Other Treatments: please follow the cardiac rehab protocol enclosed!!!    Patient's personal belongings (please select all that are sent with patient):  Glasses, pants, shirt, undergarments,shoes,     RN SIGNATURE:  Electronically signed by Meryle Lurie, RN on 6/17/21 at 11:26 AM EDT    CASE MANAGEMENT/SOCIAL WORK SECTION    Inpatient Status Date: ***    Readmission Risk Assessment Score:  Readmission Risk              Risk of Unplanned Readmission:  8           Discharging to Facility/ Agency   · Name: [facility]  · Address:  · Phone:  · Fax:    Dialysis Facility (if applicable)   · Name:  · Address:  · Dialysis Schedule:  · Phone:  · Fax:    / signature:Electronically signed by MU Hendricks on 6/17/2021 at 9:24 AM      PHYSICIAN SECTION    Prognosis: Good    Condition at Discharge: Stable    Rehab Potential (if transferring to Rehab): Good    Recommended Labs or Other Treatments After Discharge: none    Physician Certification: I certify the above information and transfer of Lisa Marin  is necessary for the continuing treatment of the diagnosis listed and that she requires subacute  rehab for less 30 days.      Update Admission H&P: see last progress note    PHYSICIAN SIGNATURE:  Electronically signed by ELLIS Golden on 6/17/21 at 9:05 AM EDT

## 2021-06-17 NOTE — PROGRESS NOTES
POD#8 Awake, alert. Denies CP, palpitations, SOB at rest, dizziness/lightheadedness. Patient states she didn't get a good night's sleep but is feeling a bit better today. Swallowing feels stronger. Vitals:    06/17/21 0313 06/17/21 0515 06/17/21 0700 06/17/21 0759   BP: (!) 117/57  119/65    Pulse: 94  93    Resp: 17  18    Temp: 99.1 °F (37.3 °C) 97.9 °F (36.6 °C) 98.7 °F (37.1 °C)    TempSrc: Infrared Oral Temporal    SpO2: 93%  98% 90%   Weight:  98 lb 3.2 oz (44.5 kg)     Height:         O2: 1L/NC      Intake/Output Summary (Last 24 hours) at 6/17/2021 0859  Last data filed at 6/17/2021 0515  Gross per 24 hour   Intake 120 ml   Output 1000 ml   Net -880 ml         +BM on 6/16      Recent Labs     06/15/21  0528 06/16/21  0619 06/17/21  0532   WBC 9.1 10.5 12.2*   HGB 10.0* 10.5* 10.9*   HCT 31.3* 33.0* 33.9*    338 415      Recent Labs     06/15/21  0528 06/16/21  0619 06/17/21  0532   BUN 32* 28* 25*   CREATININE 0.8 0.9 0.8         Telemetry: NSR      PE  Cardiac: RRR  Lungs: decreased bases  Chest incision with C/D/I, approximated, no erythema, mild swelling proximal to incision, intact staples. Sternum stable. Prior chest tube site incisions C/D/I, no erythema with intact sutures. Abd: Soft, nontender, +BS  Ext: MART, +trace BLE edema               A/P: POD# 8     1. CAD  --Stable s/p CABG x 4 ( cryo vein) s/p Exploration of mediastinum, Control of hemorrhage (small branch of left subclavian vein) on 6/9/2021  --Post op AEVLINA revealed normal biventricular function, no valvular abnormalities  --Scr stable at 0.8  --DAPT/statin- Cont plavix x 6 weeks due to smaller size of LIMA   --reinforce sternal precautions  --All tubes and wires have been removed  --empiric zosyn secondary to emergent chest re-exploration - received 11 doses total/5days        2. Acute blood loss anemia secondary to open heart surgery  --stable- hgb 12.2        3.  NSR  -- BP stable, tolerating low dose BB  -- will cont to

## 2021-06-17 NOTE — PROGRESS NOTES
PULSE OX ON ROOM AIR SITTING 93%   PULSE OX ON ROOM AIR AMBULATING 90%  PULSE OX ON ROOM AIR SITTING RECOVERY 94%  PULSE OX ON ROOM AIR AMBULATING RECOVERY 94%       Ambulated 200, tolerated fairly well.

## 2021-06-17 NOTE — PROGRESS NOTES
DAILY PROGRESS NOTE - [facility]    SUBJECTIVE:    She is being followed for non-STEMI and multivessel CAD post CABG utilizing LIMA-LAD, veins to ramus, OM, PDA. Preserved left ventricular systolic function. Currently sitting in bedside chair and says she does feel deconditioned and tired and does note persistent moderate dyspnea. 1.2 L negative over the last 24 hours but overall around 2 L positive since admission. Hypertension, hyperlipidemia, diabetes, COPD, CAD as above. OBJECTIVE:    Her vital signs were reviewed today. Blood pressure 119/65, pulse 80, respiratory rate 18    Vitals:    06/17/21 0759   BP:    Pulse:    Resp:    Temp:    SpO2: 90%       Scheduled Meds:   metoprolol tartrate  12.5 mg Oral BID    bumetanide  1 mg Intravenous BID    atorvastatin  20 mg Oral Nightly    enoxaparin  40 mg Subcutaneous Daily    aspirin  81 mg Oral Daily    sennosides-docusate sodium  1 tablet Oral BID    ferrous sulfate  325 mg Oral BID WC    vitamin C  500 mg Oral BID    folic acid  1 mg Oral Daily    insulin lispro  0-6 Units Subcutaneous TID WC    insulin lispro  0-3 Units Subcutaneous Nightly    sodium chloride flush  10 mL Intravenous 2 times per day    magnesium oxide  400 mg Oral Daily    pantoprazole  40 mg Oral Daily    ipratropium-albuterol  1 ampule Inhalation Q4H WA    clopidogrel  75 mg Oral Daily    famotidine  20 mg Oral Daily     Continuous Infusions:    PRN Meds:.magnesium hydroxide, bisacodyl, bisacodyl, levalbuterol, sodium chloride, acetaminophen, HYDROcodone 5 mg - acetaminophen **OR** HYDROcodone 5 mg - acetaminophen, potassium chloride, ondansetron, diphenhydrAMINE, sodium chloride flush    REVIEW OF SYSTEMS:     No pruritus, rash, bruising. Cardiac symptoms per HPI. No nausea, vomiting, abdominal pain, GI bleeding, change in bowel habits. No dysuria, urinary frequency, urgency, hematuria, flank pain. Joint pain but no muscle soreness, stiffness, aching.   No headache, speech disturbance, lateralizing neurologic deficit. No hemoptysis, epistaxis, easy bruising. No anxiety, depression. No symptoms of hypothyroidism, hyperthyroidism, diabetes, heat or cold intolerance. FAMILY HISTORY: Negative for CAD in first-degree relatives. SOCIAL HISTORY: Negative for alcohol, tobacco, or illicit drug use. PHYSICAL EXAM:    General Appearance:  awake, alert, oriented, in no acute distress  Neck:  no bruits  Lungs:  Normal expansion. Coarse breath sounds at bases to auscultation. No rales, rhonchi, or wheezing. Heart:  Heart sounds are normal.  Regular rate and rhythm without murmur, gallop or rub. Abdomen:  Soft, non-tender. Extremities: Extremities warm to touch, pink, with no edema. Neuro/musculosketal:  Unremarkable. LABS:    Recent Labs     06/15/21  0528 06/16/21  0619 06/17/21  0532    141 140   CREATININE 0.8 0.9 0.8       Recent Labs     06/15/21  0528 06/16/21  0619 06/17/21  0532   HGB 10.0* 10.5* 10.9*       No results for input(s): INR in the last 72 hours. IMPRESSION:    #1. CAD post CABG-stable and doing well. On both aspirin and clopidogrel. Per CT surgery, clopidogrel to be continued for 6 weeks because of smaller LIMA size. #2. Hypertension-blood pressure controlled and no new antihypertensives added at this time. #3. Hyperlipidemia-continue indefinite statin. #4.  Diabetes-per intensivist.    #5.  Acute diastolic heart failure-does not really appear significantly fluid overloaded on my examination with only scattered coarse breath sounds. The chest x-ray shows minimal if any pulmonary edema. Switch IV to oral bumetanide 1 mg twice daily. #6.  COPD-decrease metoprolol to 12.5 mg daily. #7.  Continue to follow.

## 2021-06-17 NOTE — PROGRESS NOTES
Nurse to Nurse called to Teresa Ortiz at [facility]. All pertinent information relayed, vital signs reviewed, all questions answered. Paperwork faxed.

## 2021-06-19 NOTE — DISCHARGE SUMMARY
Physician Discharge Summary     Patient ID:  Lisseth Shannon  [id number]  58 y.o.  1950    Admit date: 6/7/2021    Discharge date and time: 6/17/2021  2:24 PM     Admitting Physician: Amado Espino DO     Discharge Physician: Amado Espino DO    Admission Diagnoses: CAD in native artery [I25.10]    Discharge Diagnoses:  Severe multivessel coronary artery disease and  non-ST-elevation MI.     PROCEDURE PERFORMED:  1. Coronary artery bypass grafting x4 using left internal mammary  artery to left anterior descending artery, reverse saphenous vein graft  to ramus intermedius and obtuse marginal branch of the circumflex in a  sequential manner, and reverse saphenous vein graft to the posterior  descending artery. 2.  Rigid sternal fixation with the KLS plating system. Postoperative hemorrhage, status post open  heart surgery.     PROCEDURES PERFORMED:  1. Re-exploration of mediastinum. 2.  Mediastinal washout. 3.  Evacuation of hematoma. 4.  Control of hemorrhage. Admission Condition: good    Discharged Condition: good    Indication for Admission: 79 yo female with complaints of exertional bilateral jaw and neck pain. She presented to the ED in [place], found to have an NSTEMI and was transferred to [facility] for further workup. She denies chest pain or symptoms currently. LHC revealed severe MVCAD. Hospital Course: As above. Cardiothoracic was consulted on 6/8, and surgery was recommended. The patient underwent all pre-op testing and was deemed a candidate for surgery. CABG x4 was conducted on 6/9/21. The surgery was uncomplicated, and the patient was transferred to [facility] in stable condition. She required Norepi for hemodynamic support. She was extubated several hours post-op. However, the patient became unstable with a drop in hemoglobin from 10.7 to 7.0, tachycardic and increased need for inotropic support.  Chest xray also showed a white out of the left chest. She was brought back to the OR for exploration and control of hemorrhage. She was given 2 units of PRBC and 2 FFP. On POD 1, she was re-extubated and remained on norepi. H/H was stable and she was started on empiric zosyn. On POD 2, the patient required high flow NC, for which lasix was dosed with a good response. Norepi was discontinued. The patient remained in [address] until oxygenation improved. Pulmonary was consulted. On POD 4, she was transferred to [facility]. On POD 5, the patient tolerated 4L NC. Chest tubes were without significant drainage or airleak and were removed without difficulty. The patient noted difficulty swallowing, so a swallow eval was ordered. On POD 6, a beta blocker was able to be started. The swallow eval recommended a pureed consistency diet. The patient continued to work on ambulation and wean O2. On POD 7, a barium swallow study was ordered, which came back normal. By POD 8, the patient was stable on all medications and medically did not need to be hospitalized any further. She was discharged to rehab to improve ambulation and strength. Consults: cardiology, pulmonary/intensive care and speech pathology, PT/OT, dietary     Discharge Exam:  Expand AllCollapse All      POD#8 Awake, alert. Denies CP, palpitations, SOB at rest, dizziness/lightheadedness. Patient states she didn't get a good night's sleep but is feeling a bit better today.  Swallowing feels stronger.      Vitals[]Expand by Default          Vitals:     06/17/21 0313 06/17/21 0515 06/17/21 0700 06/17/21 0759   BP: (!) 117/57   119/65     Pulse: 94   93     Resp: 17   18     Temp: 99.1 °F (37.3 °C) 97.9 °F (36.6 °C) 98.7 °F (37.1 °C)     TempSrc: Infrared Oral Temporal     SpO2: 93%   98% 90%   Weight:   98 lb 3.2 oz (44.5 kg)       Height:                 O2: 1L/NC        Intake/Output Summary (Last 24 hours) at 6/17/2021 0859  Last data filed at 6/17/2021 0515      Gross per 24 hour   Intake 120 ml   Output 1000 ml   Net -880 ml            +BM on 6/16       Recent Labs     06/15/21  0528 21  0619 21  0532   WBC 9.1 10.5 12.2*   HGB 10.0* 10.5* 10.9*   HCT 31.3* 33.0* 33.9*    338 415            Recent Labs     06/15/21  0528 21  0619 21  0532   BUN 32* 28* 25*   CREATININE 0.8 0.9 0.8            Telemetry: NSR        PE  Cardiac: RRR  Lungs: decreased bases  Chest incision with C/D/I, approximated, no erythema, mild swelling proximal to incision, intact staples. Sternum stable. Prior chest tube site incisions C/D/I, no erythema with intact sutures.    Abd: Soft, nontender, +BS  Ext: MART, +trace BLE edema         Disposition: rehab     Patient Instructions:    Jeremiah Tidwell   Home Medication Instructions EW    Printed on:21 0084   Medication Information                      amoxicillin-clavulanate (AUGMENTIN) 875-125 MG per tablet  Take 1 tablet by mouth 2 times daily for 7 days             ascorbic acid (VITAMIN C) 500 MG tablet  Take 1 tablet by mouth 2 times daily             aspirin 81 MG chewable tablet  Take 81 mg by mouth daily             atorvastatin (LIPITOR) 20 MG tablet  Take 80 mg by mouth daily              bumetanide (BUMEX) 2 MG tablet  Take 0.5 tablets by mouth daily             clopidogrel (PLAVIX) 75 MG tablet  Take 1 tablet by mouth daily             docusate sodium (COLACE) 100 MG capsule  Take 1 capsule by mouth 2 times daily as needed for Constipation             enoxaparin (LOVENOX) 40 MG/0.4ML injection  Inject 0.4 mLs into the skin daily Continue until patient is ambulating 400ft or more consistently             famotidine (PEPCID) 20 MG tablet  Take 20 mg by mouth 2 times daily             ferrous sulfate (IRON 325) 325 (65 Fe) MG tablet  Take 1 tablet by mouth 2 times daily (with meals)             folic acid (FOLVITE) 1 MG tablet  Take 1 tablet by mouth daily             HYDROcodone-acetaminophen (NORCO) 5-325 MG per tablet  Take 1 tablet by mouth every 4 hours as needed for Pain for up to 7 days. Intended supply: 7 days. Take lowest dose possible to manage pain             ibuprofen (ADVIL;MOTRIN) 200 MG tablet  Take 200 mg by mouth every 6 hours as needed for Pain (hips/back)             levalbuterol (XOPENEX) 0.63 MG/3ML nebulization  Take 3 mLs by nebulization every 8 hours as needed for Wheezing             magnesium oxide (MAG-OX) 400 (241.3 Mg) MG TABS tablet  Take 1 tablet by mouth daily for 14 days             metoprolol tartrate (LOPRESSOR) 25 MG tablet  Take 0.5 tablets by mouth 2 times daily             potassium chloride (KLOR-CON M) 10 MEQ extended release tablet  Take 1 tablet by mouth every other day for 14 days             Respiratory Therapy Supplies (FULL KIT NEBULIZER SET) MISC  Use as directed with nebulized medication. sodium chloride (OCEAN, BABY AYR) 0.65 % nasal spray  1 spray by Nasal route as needed for Congestion               Activity: sternal precautions   Diet: cardiac diet  Wound Care: keep wound clean and dry    Future Appointments   Date Time Provider Tra Ibrahim   7/13/2021 12:30 PM Keely Lechuga DO CARDIO SURG [facility]   7/14/2021 11:30 AM Rony Fuentes MD [facility]       Smoking cessation education provided prior to discharge    Discussed with patient the benefits of participation in cardiac rehab after discharge once approved safe by the surgeon and that a referral will be made at the follow up appointment. Pt verbalized comprehension.     Signed:  Electronically signed by ELLIS Cardona on 6/19/2021 at 9:34 AM

## 2021-07-07 ENCOUNTER — TELEPHONE (OUTPATIENT)
Dept: ADMINISTRATIVE | Age: 71
End: 2021-07-07

## 2021-07-07 NOTE — TELEPHONE ENCOUNTER
This is a New Pt-Hospital Follow up scheduled with Dr Araceli Rider. It is scheduled for 7/14 and  has requested this day off. Can you please discuss with  when she wants to r/s and call pt? Thank you.

## 2021-07-13 ENCOUNTER — OFFICE VISIT (OUTPATIENT)
Dept: CARDIOTHORACIC SURGERY | Age: 71
End: 2021-07-13

## 2021-07-13 VITALS — WEIGHT: 93 LBS | HEIGHT: 62 IN | BODY MASS INDEX: 17.11 KG/M2

## 2021-07-13 DIAGNOSIS — Z95.1 S/P CABG (CORONARY ARTERY BYPASS GRAFT): Primary | ICD-10-CM

## 2021-07-13 PROCEDURE — 99024 POSTOP FOLLOW-UP VISIT: CPT | Performed by: PHYSICIAN ASSISTANT

## 2021-07-13 ASSESSMENT — ENCOUNTER SYMPTOMS
COUGH: 0
SHORTNESS OF BREATH: 0

## 2021-07-13 NOTE — PROGRESS NOTES
Subjective:      Chief Complaint   Patient presents with    Post-Op Check     cabg x 4       Patient ID: Miguel Bruno is a 78 y.o. female who presents to office for routine 1 month follow up s/p CABG x 4 on 6/9/2021. Patient subsequently was DC'd to rehab from hospital but has since been discharged. She states she is feeling well other than feeling like her appetite is low. She denies any CP, SOB or incisional problems     HPI    Review of Systems   Constitutional: Negative for chills and fever. Respiratory: Negative for cough and shortness of breath. Cardiovascular: Negative for chest pain. Neurological: Negative for syncope and light-headedness. Objective:   Physical Exam  Constitutional:       Appearance: Normal appearance. Cardiovascular:      Rate and Rhythm: Normal rate and regular rhythm. Pulses: Normal pulses. Heart sounds: Normal heart sounds. Pulmonary:      Effort: Pulmonary effort is normal.      Breath sounds: Normal breath sounds. Comments: Sternum stable   Chest inc healing well, with both staples and CT sutures still intact. No surrounding redness. + mild scabbing noted   Abdominal:      General: Bowel sounds are normal.   Musculoskeletal:         General: No swelling. Normal range of motion. Cervical back: Normal range of motion and neck supple. Neurological:      Mental Status: She is alert. Assessment:      S/[p CABG      Plan:      Remove sternal precautions after 6 weeks   Sternal inc staples and CT sutures removed without difficulty. Cardiac rehab referral  Continue follow up with PCP, Cardiology as scheduled. Encouraged to call office with any questions, concerns. Otherwise no further follow up necessary from CTS standpoint.               Harmony Vizcaino PA-C

## 2021-07-19 ENCOUNTER — TELEPHONE (OUTPATIENT)
Dept: PRIMARY CARE CLINIC | Age: 71
End: 2021-07-19

## 2021-07-19 NOTE — TELEPHONE ENCOUNTER
[facility] called wanted to know if you would follow for  skilled nursing and physical therapy after your appointment with her on the 27th.       Can her back to let her know at  [phone]

## 2021-07-21 NOTE — TELEPHONE ENCOUNTER
Joseph Martinez called back to make sure that Alecia Claudio still has an appt on 07/27. I advised that she does and Joseph Martinez said she will wait until Alecia Claudio has been seen and will contact us again about orders.

## 2021-07-27 ENCOUNTER — TELEPHONE (OUTPATIENT)
Dept: PRIMARY CARE CLINIC | Age: 71
End: 2021-07-27

## 2021-07-27 ENCOUNTER — OFFICE VISIT (OUTPATIENT)
Dept: PRIMARY CARE CLINIC | Age: 71
End: 2021-07-27
Payer: MEDICARE

## 2021-07-27 VITALS
SYSTOLIC BLOOD PRESSURE: 104 MMHG | OXYGEN SATURATION: 93 % | WEIGHT: 91 LBS | HEIGHT: 62 IN | RESPIRATION RATE: 20 BRPM | DIASTOLIC BLOOD PRESSURE: 68 MMHG | HEART RATE: 104 BPM | TEMPERATURE: 98.2 F | BODY MASS INDEX: 16.75 KG/M2

## 2021-07-27 DIAGNOSIS — D62 ACUTE BLOOD LOSS ANEMIA: ICD-10-CM

## 2021-07-27 DIAGNOSIS — E78.2 MIXED HYPERLIPIDEMIA: ICD-10-CM

## 2021-07-27 DIAGNOSIS — I25.10 CAD IN NATIVE ARTERY: Primary | ICD-10-CM

## 2021-07-27 DIAGNOSIS — I25.10 CAD IN NATIVE ARTERY: ICD-10-CM

## 2021-07-27 LAB
ALBUMIN SERPL-MCNC: 3.9 G/DL (ref 3.5–5.2)
ALP BLD-CCNC: 433 U/L (ref 35–104)
ALT SERPL-CCNC: 51 U/L (ref 0–32)
ANION GAP SERPL CALCULATED.3IONS-SCNC: 16 MMOL/L (ref 7–16)
AST SERPL-CCNC: 53 U/L (ref 0–31)
BASOPHILS ABSOLUTE: 0.08 E9/L (ref 0–0.2)
BASOPHILS RELATIVE PERCENT: 1 % (ref 0–2)
BILIRUB SERPL-MCNC: 0.3 MG/DL (ref 0–1.2)
BUN BLDV-MCNC: 16 MG/DL (ref 6–23)
CALCIUM SERPL-MCNC: 9.7 MG/DL (ref 8.6–10.2)
CHLORIDE BLD-SCNC: 100 MMOL/L (ref 98–107)
CO2: 31 MMOL/L (ref 22–29)
CREAT SERPL-MCNC: 0.9 MG/DL (ref 0.5–1)
EOSINOPHILS ABSOLUTE: 0.18 E9/L (ref 0.05–0.5)
EOSINOPHILS RELATIVE PERCENT: 2.2 % (ref 0–6)
FERRITIN: 599 NG/ML
FOLATE: >20 NG/ML (ref 4.8–24.2)
GFR AFRICAN AMERICAN: >60
GFR NON-AFRICAN AMERICAN: >60 ML/MIN/1.73
GLUCOSE BLD-MCNC: 106 MG/DL (ref 74–99)
HCT VFR BLD CALC: 39.4 % (ref 34–48)
HEMOGLOBIN: 11.9 G/DL (ref 11.5–15.5)
IMMATURE GRANULOCYTES #: 0.02 E9/L
IMMATURE GRANULOCYTES %: 0.2 % (ref 0–5)
IRON SATURATION: 25 % (ref 15–50)
IRON: 67 MCG/DL (ref 37–145)
LYMPHOCYTES ABSOLUTE: 1.67 E9/L (ref 1.5–4)
LYMPHOCYTES RELATIVE PERCENT: 20.4 % (ref 20–42)
MCH RBC QN AUTO: 28.1 PG (ref 26–35)
MCHC RBC AUTO-ENTMCNC: 30.2 % (ref 32–34.5)
MCV RBC AUTO: 93.1 FL (ref 80–99.9)
MONOCYTES ABSOLUTE: 0.83 E9/L (ref 0.1–0.95)
MONOCYTES RELATIVE PERCENT: 10.1 % (ref 2–12)
NEUTROPHILS ABSOLUTE: 5.41 E9/L (ref 1.8–7.3)
NEUTROPHILS RELATIVE PERCENT: 66.1 % (ref 43–80)
PDW BLD-RTO: 14.4 FL (ref 11.5–15)
PLATELET # BLD: 395 E9/L (ref 130–450)
PMV BLD AUTO: 10.4 FL (ref 7–12)
POTASSIUM SERPL-SCNC: 4.1 MMOL/L (ref 3.5–5)
RBC # BLD: 4.23 E12/L (ref 3.5–5.5)
SODIUM BLD-SCNC: 147 MMOL/L (ref 132–146)
TOTAL IRON BINDING CAPACITY: 267 MCG/DL (ref 250–450)
TOTAL PROTEIN: 8.4 G/DL (ref 6.4–8.3)
VITAMIN B-12: 660 PG/ML (ref 211–946)
WBC # BLD: 8.2 E9/L (ref 4.5–11.5)

## 2021-07-27 PROCEDURE — 4040F PNEUMOC VAC/ADMIN/RCVD: CPT | Performed by: NURSE PRACTITIONER

## 2021-07-27 PROCEDURE — 1123F ACP DISCUSS/DSCN MKR DOCD: CPT | Performed by: NURSE PRACTITIONER

## 2021-07-27 PROCEDURE — 1090F PRES/ABSN URINE INCON ASSESS: CPT | Performed by: NURSE PRACTITIONER

## 2021-07-27 PROCEDURE — G8427 DOCREV CUR MEDS BY ELIG CLIN: HCPCS | Performed by: NURSE PRACTITIONER

## 2021-07-27 PROCEDURE — 3017F COLORECTAL CA SCREEN DOC REV: CPT | Performed by: NURSE PRACTITIONER

## 2021-07-27 PROCEDURE — 99203 OFFICE O/P NEW LOW 30 MIN: CPT | Performed by: NURSE PRACTITIONER

## 2021-07-27 PROCEDURE — 1036F TOBACCO NON-USER: CPT | Performed by: NURSE PRACTITIONER

## 2021-07-27 PROCEDURE — G8419 CALC BMI OUT NRM PARAM NOF/U: HCPCS | Performed by: NURSE PRACTITIONER

## 2021-07-27 PROCEDURE — G8400 PT W/DXA NO RESULTS DOC: HCPCS | Performed by: NURSE PRACTITIONER

## 2021-07-27 RX ORDER — ATORVASTATIN CALCIUM 80 MG/1
TABLET, FILM COATED ORAL
COMMUNITY
Start: 2021-07-02 | End: 2021-07-27 | Stop reason: SDUPTHER

## 2021-07-27 RX ORDER — FAMOTIDINE 20 MG/1
20 TABLET, FILM COATED ORAL 2 TIMES DAILY
Qty: 60 TABLET | Refills: 3 | Status: SHIPPED
Start: 2021-07-27 | End: 2021-11-19 | Stop reason: SDUPTHER

## 2021-07-27 RX ORDER — CLOPIDOGREL BISULFATE 75 MG/1
75 TABLET ORAL DAILY
Qty: 30 TABLET | Refills: 3 | Status: SHIPPED
Start: 2021-07-27 | End: 2021-11-19 | Stop reason: SDUPTHER

## 2021-07-27 RX ORDER — BUMETANIDE 2 MG/1
1 TABLET ORAL DAILY
Qty: 30 TABLET | Refills: 3 | Status: SHIPPED
Start: 2021-07-27 | End: 2021-08-24

## 2021-07-27 RX ORDER — ATORVASTATIN CALCIUM 80 MG/1
80 TABLET, FILM COATED ORAL DAILY
Qty: 30 TABLET | Refills: 3 | Status: SHIPPED
Start: 2021-07-27 | End: 2021-11-19 | Stop reason: SDUPTHER

## 2021-07-27 RX ORDER — FOLIC ACID 1 MG/1
1 TABLET ORAL DAILY
Qty: 30 TABLET | Refills: 0 | Status: SHIPPED
Start: 2021-07-27 | End: 2021-08-04 | Stop reason: ALTCHOICE

## 2021-07-27 SDOH — ECONOMIC STABILITY: FOOD INSECURITY: WITHIN THE PAST 12 MONTHS, YOU WORRIED THAT YOUR FOOD WOULD RUN OUT BEFORE YOU GOT MONEY TO BUY MORE.: NEVER TRUE

## 2021-07-27 SDOH — ECONOMIC STABILITY: FOOD INSECURITY: WITHIN THE PAST 12 MONTHS, THE FOOD YOU BOUGHT JUST DIDN'T LAST AND YOU DIDN'T HAVE MONEY TO GET MORE.: NEVER TRUE

## 2021-07-27 ASSESSMENT — SOCIAL DETERMINANTS OF HEALTH (SDOH): HOW HARD IS IT FOR YOU TO PAY FOR THE VERY BASICS LIKE FOOD, HOUSING, MEDICAL CARE, AND HEATING?: NOT HARD AT ALL

## 2021-07-27 ASSESSMENT — PATIENT HEALTH QUESTIONNAIRE - PHQ9
SUM OF ALL RESPONSES TO PHQ QUESTIONS 1-9: 0
2. FEELING DOWN, DEPRESSED OR HOPELESS: 0
SUM OF ALL RESPONSES TO PHQ9 QUESTIONS 1 & 2: 0
SUM OF ALL RESPONSES TO PHQ QUESTIONS 1-9: 0
SUM OF ALL RESPONSES TO PHQ QUESTIONS 1-9: 0
1. LITTLE INTEREST OR PLEASURE IN DOING THINGS: 0

## 2021-07-27 ASSESSMENT — ENCOUNTER SYMPTOMS: GASTROINTESTINAL NEGATIVE: 1

## 2021-07-27 NOTE — TELEPHONE ENCOUNTER
The patient was referred to cardiac rehab on 7/13. When she established with me today, she did not know if she was to continue this referral or not. I recommend that she does. Please contact her with the information to get started.

## 2021-07-27 NOTE — PROGRESS NOTES
Subjective  CC: Patient presents to establish. Previously saw Dr. Mikal Rivera but had been several years prior to recent hospitalization. HPI:   This is a patient who is new to my practice. She was seen at the [facility] emergency room at the beginning of June, and transferred to [facility] for cardiac catheterization and subsequently CABG x4. The patient states that she had previously seen her PCP on an as-needed basis, but that had been several years since she had been seen prior to the onset of chest discomfort. She states she did have symptoms for about 1 month prior to hospitalization. The patient is now about 6 weeks out from surgery. She did have postoperative complication of blood loss, and is currently taking folic acid and iron. She would like to be off of these. I reviewed the patient's labs from the hospital, her LFTs were also elevated which could potentially be chronic. We discussed preventative measures, the patient is willing to undergo these in the future. Unfortunately, she did not establish with cardiac rehab and we will see if this is a possibility at this time. ROS:  Review of Systems   Constitutional:        Weight loss of about 15-20lbs with surgery   HENT: Negative. Eyes:        Wears glasses, dry eyes   Respiratory:        ? COPD   Cardiovascular:        CABGx4   Gastrointestinal: Negative. Genitourinary: Negative. Musculoskeletal:        Bilateral hip pain   Skin: Negative. Neurological: Negative.     Psychiatric/Behavioral:        Insomnia          Current Outpatient Medications:     bumetanide (BUMEX) 2 MG tablet, Take 0.5 tablets by mouth daily, Disp: 30 tablet, Rfl: 3    clopidogrel (PLAVIX) 75 MG tablet, Take 1 tablet by mouth daily, Disp: 30 tablet, Rfl: 3    folic acid (FOLVITE) 1 MG tablet, Take 1 tablet by mouth daily, Disp: 30 tablet, Rfl: 0    metoprolol tartrate (LOPRESSOR) 25 MG tablet, Take 0.5 tablets by mouth 2 times daily, Disp: 60 tablet, Rfl: 3    atorvastatin (LIPITOR) 80 MG tablet, Take 1 tablet by mouth daily, Disp: 30 tablet, Rfl: 3    famotidine (PEPCID) 20 MG tablet, Take 1 tablet by mouth 2 times daily, Disp: 60 tablet, Rfl: 3    aspirin 81 MG chewable tablet, Take 81 mg by mouth daily, Disp: , Rfl:     Respiratory Therapy Supplies (FULL KIT NEBULIZER SET) MISC, Use as directed with nebulized medication. , Disp: 1 each, Rfl: 0   Allergies   Allergen Reactions    Tramadol Nausea Only        PMH:  Past Medical History:   Diagnosis Date    CAD in native artery 6/7/2021    COPD [facility]chronic obstructive pulmonary disease) ([facility])     Hyperlipidemia     Hypertension         Objective  Vitals:    07/27/21 1100   BP: 104/68   Site: Left Upper Arm   Position: Sitting   Cuff Size: Medium Adult   Pulse: 104   Resp: 20   Temp: 98.2 °F (36.8 °C)   TempSrc: Temporal   SpO2: 93%   Weight: 91 lb (41.3 kg)   Height: 5' 1.5\" (1.562 m)      Exam:  Const: Appears healthy, well developed and well nourished. Appears underweight. Eyes: EOMI in both eyes. PERRL. ENMT: External ears WNL. Tympanic membranes are intact. Septum is in the midline. Posterior  pharynx shows no exudate, irritation or redness. Neck: Supple and symmetric. Palpation reveals no adenopathy. No masses appreciated. Thyroid  exhibits no nodule or thyromegaly. No JVD. Resp: Respirations are unlabored. Respiration rate is normal. Auscultate good airflow. No rales,  rhonchi or wheezes appreciated over the lungs bilaterally. Abdomen: BS x 4 quadrants. Abdomen soft, round, nontender. No organomegaly noted. CV: Rhythm is regular. S1 is normal. S2 is normal. Carotids: no bruits. Abdominal aorta: not  palpable. Pedal pulses: 2+ and equal bilaterally. Extremities: No clubbing or cyanosis. Musculo: Walks with a normal gait. Upper Extremities: Full ROM bilaterally. Lower Extremities: Full  ROM bilaterally. Skin: Dry and warm with no rash. Neuro: Alert and oriented x3.  Mood is normal. Affect is normal. Speech is articulate and fluent. Opal Stanlye was seen today for establish care and medication refill. Diagnoses and all orders for this visit:    CAD in native artery  -     bumetanide (BUMEX) 2 MG tablet; Take 0.5 tablets by mouth daily  -     clopidogrel (PLAVIX) 75 MG tablet; Take 1 tablet by mouth daily  -     metoprolol tartrate (LOPRESSOR) 25 MG tablet; Take 0.5 tablets by mouth 2 times daily  -     atorvastatin (LIPITOR) 80 MG tablet; Take 1 tablet by mouth daily  -     famotidine (PEPCID) 20 MG tablet; Take 1 tablet by mouth 2 times daily  -     CBC Auto Differential; Future  -     External Referral To Cardiology    Acute blood loss anemia  -     folic acid (FOLVITE) 1 MG tablet; Take 1 tablet by mouth daily  -     CBC Auto Differential; Future  -     IRON AND TIBC; Future  -     FERRITIN; Future  -     VITAMIN B12 & FOLATE; Future    Mixed hyperlipidemia  -     atorvastatin (LIPITOR) 80 MG tablet; Take 1 tablet by mouth daily  -     CBC Auto Differential; Future  -     Comprehensive Metabolic Panel; Future       Care Plan:  Labs will be obtained today. May consider discontinuation of the iron and folate acid pending her lab results. She will be referred to cardiology. I will plan to see her back in about 1 month.

## 2021-07-28 DIAGNOSIS — R74.8 ELEVATED ALKALINE PHOSPHATASE LEVEL: Primary | ICD-10-CM

## 2021-07-28 LAB — T4 FREE: 1.27 NG/DL (ref 0.93–1.7)

## 2021-07-29 ENCOUNTER — TELEPHONE (OUTPATIENT)
Dept: PRIMARY CARE CLINIC | Age: 71
End: 2021-07-29

## 2021-07-29 NOTE — TELEPHONE ENCOUNTER
Patient called stating she scheduled Cardiac Rehab for Aug 4th. She got a call to schedule with Dr. Jennifer Matthews but they told her she should complete the cardiac rehab before scheduling with Dr. Jennifer Matthews. She also wants to know if she is supposed to be lowering her Atorvastatin and also should she take Coq10 with it?

## 2021-07-29 NOTE — TELEPHONE ENCOUNTER
She should not decrease the atorvastatin at this time. She does not need to take co-Q10 in addition to this at this time. [facility]
                                    [address]
                                  [place]  [address]
_________________________________________________________________________________________
                                                                 Signed   
 
 
Normal sinus rhythm
Right atrial enlargement
Possible Right ventricular hypertrophy
Abnormal ECG
No previous ECGs available
Confirmed by AUGUSTO RUBALCAVA MD ([id number] on 3/14/2019 7:34:52 AM
 
 
 
 
 
 
 
 
 
 
 
 
 
 
 
 
 
 
 
 
 
 
 
 
 
 
 
 
 
 
 
 
 
 
 
 
 
 
 
    Electronically Signed By: AUGUSTO RUBALCAVA MD  03/14/19 0735
_________________________________________________________________________________________
PATIENT NAME:     SANJAY HERNANDEZJUNIOR GONZALEZBAN        
MEDICAL RECORD #: [id number]                     Electrocardiogram             
          ACCT #: [id number]  
DATE OF BIRTH:   [birth year]                                       
PHYSICIAN:   AUGUSTO RUBALCAVA MD                   REPORT #: [id number]
REPORT IS CONFIDENTIAL AND NOT TO BE RELEASED WITHOUT AUTHORIZATION

## 2021-07-30 ENCOUNTER — TELEPHONE (OUTPATIENT)
Dept: PRIMARY CARE CLINIC | Age: 71
End: 2021-07-30

## 2021-07-30 NOTE — TELEPHONE ENCOUNTER
Received phone call from [facility] patient is being discharged from [facility] today. I gave verbal okay for services and you would follow patient's care.     Electronically signed by Maria Elena Claros LPN on 8/35/9063 at 21:64 AM

## 2021-07-30 NOTE — TELEPHONE ENCOUNTER
I saw her in the office earlier this week, she's actually been home for a while now. I'm not sure why they think she is just now being discharged.

## 2021-07-31 LAB
ALK PHOS OTHER CALC: 0 U/L
ALK PHOSPHATASE: 502 U/L (ref 40–120)
ALKALINE PHOSPHATASE BONE FRACTION: 75 U/L (ref 0–55)
ALKALINE PHOSPHATASE LIVER FRACTION: 427 U/L (ref 0–94)

## 2021-08-02 DIAGNOSIS — R74.8 ELEVATED LIVER ENZYMES: Primary | ICD-10-CM

## 2021-08-04 ENCOUNTER — HOSPITAL ENCOUNTER (OUTPATIENT)
Dept: CARDIAC REHAB | Age: 71
Discharge: HOME | End: 2021-08-04

## 2021-08-04 VITALS
OXYGEN SATURATION: 97 % | SYSTOLIC BLOOD PRESSURE: 88 MMHG | HEART RATE: 96 BPM | DIASTOLIC BLOOD PRESSURE: 58 MMHG | WEIGHT: 90.6 LBS | BODY MASS INDEX: 16.67 KG/M2 | HEIGHT: 62 IN | RESPIRATION RATE: 20 BRPM

## 2021-08-04 PROCEDURE — 9900000038 HC CARDIAC REHAB PHASE 3 - MONTHLY

## 2021-08-04 ASSESSMENT — PATIENT HEALTH QUESTIONNAIRE - PHQ9
SUM OF ALL RESPONSES TO PHQ QUESTIONS 1-9: 0
SUM OF ALL RESPONSES TO PHQ QUESTIONS 1-9: 0

## 2021-08-13 ENCOUNTER — HOSPITAL ENCOUNTER (OUTPATIENT)
Dept: ULTRASOUND IMAGING | Age: 71
Discharge: HOME | End: 2021-08-15
Payer: MEDICARE

## 2021-08-13 DIAGNOSIS — R74.8 ELEVATED LIVER ENZYMES: ICD-10-CM

## 2021-08-13 PROCEDURE — 76705 ECHO EXAM OF ABDOMEN: CPT

## 2021-08-24 ENCOUNTER — OFFICE VISIT (OUTPATIENT)
Dept: PRIMARY CARE CLINIC | Age: 71
End: 2021-08-24
Payer: MEDICARE

## 2021-08-24 VITALS
WEIGHT: 89 LBS | HEART RATE: 96 BPM | HEIGHT: 62 IN | OXYGEN SATURATION: 94 % | SYSTOLIC BLOOD PRESSURE: 92 MMHG | RESPIRATION RATE: 20 BRPM | BODY MASS INDEX: 16.38 KG/M2 | DIASTOLIC BLOOD PRESSURE: 60 MMHG | TEMPERATURE: 98.5 F

## 2021-08-24 DIAGNOSIS — I25.10 CAD IN NATIVE ARTERY: ICD-10-CM

## 2021-08-24 DIAGNOSIS — E78.2 MIXED HYPERLIPIDEMIA: ICD-10-CM

## 2021-08-24 DIAGNOSIS — R74.8 ELEVATED LIVER ENZYMES: Primary | ICD-10-CM

## 2021-08-24 DIAGNOSIS — R74.8 ELEVATED ALKALINE PHOSPHATASE LEVEL: ICD-10-CM

## 2021-08-24 DIAGNOSIS — Z11.59 NEED FOR HEPATITIS B SCREENING TEST: ICD-10-CM

## 2021-08-24 PROCEDURE — 99213 OFFICE O/P EST LOW 20 MIN: CPT | Performed by: NURSE PRACTITIONER

## 2021-08-24 PROCEDURE — G8400 PT W/DXA NO RESULTS DOC: HCPCS | Performed by: NURSE PRACTITIONER

## 2021-08-24 PROCEDURE — G8419 CALC BMI OUT NRM PARAM NOF/U: HCPCS | Performed by: NURSE PRACTITIONER

## 2021-08-24 PROCEDURE — G8427 DOCREV CUR MEDS BY ELIG CLIN: HCPCS | Performed by: NURSE PRACTITIONER

## 2021-08-24 PROCEDURE — 1036F TOBACCO NON-USER: CPT | Performed by: NURSE PRACTITIONER

## 2021-08-24 PROCEDURE — 3017F COLORECTAL CA SCREEN DOC REV: CPT | Performed by: NURSE PRACTITIONER

## 2021-08-24 PROCEDURE — 1090F PRES/ABSN URINE INCON ASSESS: CPT | Performed by: NURSE PRACTITIONER

## 2021-08-24 PROCEDURE — 4040F PNEUMOC VAC/ADMIN/RCVD: CPT | Performed by: NURSE PRACTITIONER

## 2021-08-24 PROCEDURE — 1123F ACP DISCUSS/DSCN MKR DOCD: CPT | Performed by: NURSE PRACTITIONER

## 2021-08-24 RX ORDER — BUMETANIDE 2 MG/1
1 TABLET ORAL DAILY
Qty: 8 TABLET | Refills: 0
Start: 2021-08-24 | End: 2022-05-24

## 2021-08-24 RX ORDER — BLOOD PRESSURE TEST KIT
1 KIT MISCELLANEOUS 2 TIMES DAILY
Qty: 1 KIT | Refills: 0 | Status: SHIPPED | OUTPATIENT
Start: 2021-08-24

## 2021-08-24 RX ORDER — NITROGLYCERIN 0.4 MG/1
0.4 TABLET SUBLINGUAL EVERY 5 MIN PRN
COMMUNITY
Start: 2021-08-20 | End: 2022-08-15 | Stop reason: SDUPTHER

## 2021-08-24 ASSESSMENT — ENCOUNTER SYMPTOMS: GASTROINTESTINAL NEGATIVE: 1

## 2021-08-24 NOTE — PROGRESS NOTES
Subjective  CC: Patient presents to follow-up. HPI:   This is a patient with CABG x4 earlier this year. The patient did see cardiology, metoprolol was increased and Bumex was decreased due to tachycardia. The patient states she does not have a blood pressure machine at home, so I have given her a prescription for this so that she can monitor her heart rate and blood pressures. She has not had any cardiac symptomatology. She is undergoing cardiac rehab and states she is doing well in regards to this. Her LFTs remained elevated at her last office visit, and her alkaline phosphatase was increased from previous. Isoenzymes showed a significant increase in liver specific isoenzymes, and also a mild increase in bone isoenzymes. The patient denies any known exposure to hepatitis C or hepatitis B. She continues on the atorvastatin. These labs will be rechecked today. The patient is not yet ready to discuss cancer screenings or other immunizations. ROS:  Review of Systems   Constitutional:        Weight loss of about 15-20lbs with surgery   HENT: Negative. Eyes:        Wears glasses, dry eyes   Respiratory:        ? COPD   Cardiovascular:        CABGx4   Gastrointestinal: Negative. Genitourinary: Negative. Musculoskeletal:        Bilateral hip pain   Skin: Negative. Neurological: Negative. Psychiatric/Behavioral:        Insomnia          Current Outpatient Medications:     metoprolol tartrate (LOPRESSOR) 25 MG tablet, Take 1 tablet by mouth 2 times daily, Disp: 60 tablet, Rfl: 0    bumetanide (BUMEX) 2 MG tablet, Take 0.5 tablets by mouth daily Indications:  Take 1 mg Tuesday and Thursday only, Disp: 8 tablet, Rfl: 0    Blood Pressure KIT, 1 kit by Does not apply route 2 times daily, Disp: 1 kit, Rfl: 0    nitroGLYCERIN (NITROSTAT) 0.4 MG SL tablet, Place 0.4 mg under the tongue every 5 minutes as needed , Disp: , Rfl:     clopidogrel (PLAVIX) 75 MG tablet, Take 1 tablet by mouth daily, Disp: 30 tablet, Rfl: 3    atorvastatin (LIPITOR) 80 MG tablet, Take 1 tablet by mouth daily, Disp: 30 tablet, Rfl: 3    famotidine (PEPCID) 20 MG tablet, Take 1 tablet by mouth 2 times daily, Disp: 60 tablet, Rfl: 3    Respiratory Therapy Supplies (FULL KIT NEBULIZER SET) MISC, Use as directed with nebulized medication. , Disp: 1 each, Rfl: 0    aspirin 81 MG chewable tablet, Take 81 mg by mouth daily, Disp: , Rfl:    Allergies   Allergen Reactions    Latex Itching     Swelling, itching, and redness    Tramadol Nausea Only        PMH:  Past Medical History:   Diagnosis Date    CAD in native artery 6/7/2021    COPD (chronic obstructive pulmonary disease) ([facility] [address]     Hyperlipidemia     Hypertension         Objective  Vitals:    08/24/21 1323   BP: 92/60   Site: Left Upper Arm   Position: Sitting   Cuff Size: Medium Adult   Pulse: 96   Resp: 20   Temp: 98.5 °F (36.9 °C)   TempSrc: Temporal   SpO2: 94%   Weight: 89 lb (40.4 kg)   Height: 5' 1.5\" (1.562 m)      Exam:  Const: Appears healthy, well developed and well nourished. Appears underweight. Eyes: EOMI in both eyes. PERRL. ENMT: External ears WNL. Tympanic membranes are intact. Septum is in the midline. Posterior  pharynx shows no exudate, irritation or redness. Neck: Supple and symmetric. Palpation reveals no adenopathy. No masses appreciated. Thyroid  exhibits no nodule or thyromegaly. No JVD. Resp: Respirations are unlabored. Respiration rate is normal. Auscultate good airflow. No rales,  rhonchi or wheezes appreciated over the lungs bilaterally. CV: Rhythm is regular. S1 is normal. S2 is normal.  Extremities: No clubbing or cyanosis. Musculo: Walks with a normal gait. Upper Extremities: Full ROM bilaterally. Lower Extremities: Full  ROM bilaterally. Skin: Dry and warm with no rash. Neuro: Alert and oriented x3. Mood is normal. Affect is normal. Speech is articulate and fluent.     Patricia Bansal was seen today for hyperlipidemia and

## 2021-09-01 PROCEDURE — 9900000038 HC CARDIAC REHAB PHASE 3 - MONTHLY

## 2021-09-06 DIAGNOSIS — I25.10 CAD IN NATIVE ARTERY: ICD-10-CM

## 2021-09-07 NOTE — TELEPHONE ENCOUNTER
Last Appointment:  8/24/2021  Future Appointments   Date Time Provider Tra Ibrahim   9/13/2021 11:00 AM SCHEDULE, JUN [facility] NUTRITION ROOM JUN Johnson   11/23/2021 11:00 AM NOMAN Moura CNP [facility]   11/23/2021 11:15 AM NOMAN Berger CNP [facility]

## 2021-09-10 ENCOUNTER — TELEPHONE (OUTPATIENT)
Dept: CARDIAC REHAB | Age: 71
End: 2021-09-10

## 2021-09-10 NOTE — TELEPHONE ENCOUNTER
9/10/2021 1557 Nutrition: Left voice message on this date regarding appointment for 9/13/2021 at 11:00 am. Will remain available.  Electronically signed by Jam Lowe RD, ESVIN on 9/10/21 at 3:58 PM EDT

## 2021-09-13 ENCOUNTER — HOSPITAL ENCOUNTER (OUTPATIENT)
Dept: CARDIAC REHAB | Age: 71
Discharge: HOME | End: 2021-09-13

## 2021-09-13 VITALS — WEIGHT: 87.2 LBS | BODY MASS INDEX: 16.05 KG/M2 | HEIGHT: 62 IN

## 2021-09-13 NOTE — PROGRESS NOTES
Outpatient Dietitian Assessment  9/13/2021    Kady German 73 y.o. female    PCP:   Andrew Miller, NOMAN - CNP    Assessment:  Subjective data:Patient in for nutrition counseling. Patient reports she did lose weight due while in rehab due to dislike of food and on restrictive diet and wants to be at healthy weight. RD explained malnourished condition and BMI. Patient verbalized understanding. Objective data:   Patient attest to negative for COVID-19 symptos. Weight History:Prior to cardiac event usual weight: 105 pounds. Patient weight today: 87 pounds. Patient lost 18 pounds over 3 months indicating a 17% weigh loss. Weight loss not beneficial. Discussed a slow weight gain of one pound per month to achieve usual body weight 105 pounds. Patient verbalized understanding. Patient reports she wants to be at healthy BMI. Appearance:Pt has small size frame. Patient appears thin, severe  loss muscle mass and severe fat loss in arms and legs     Comparative Standards:  Ideal Body Weight: (IBW): 110 pounds +/- 10%  Ideal Body Weight%: 79%  Resting Energy Expenditure(REE): 879   Estimated Total Calories: 1700 calories/day  (REE x 1.4 (AF) + 500 calories for repletion/weight gain ) ( [place], actual weight)  Estimated Total Protein needs: 50- 55 grams/day ( based on 1 to 1.1 grams/kg IBW)  Estimated Total Fluid needs: 987 ml to 1200 ml/day ( based on 25- 30 ml/kg actual body weight)   Needs subject to change as patient gains weight. .    Pertinent Past Medical/Surgical History:COPD, Hypertension, Hyperlipidemia, CAD, postop hemorrhage control,  S/p CABG x 4,   History of dysphagia on file     Pertinent Lipid Lowering Drug/Nutrient Interactions:Lipitor- no grapefruit products    Alcohol use:none     Diet at home:     Food allergies/Intolerances:none (on file,  pt.  Is allergic to latex)    Appetite:intake fair ( improving intake)     Cultural, Gnosticist or ethnic food preferences:none     Difficulty Chewing and/or Swallowing:none reported. Patient reports swallowing problems were when \"tubes in\". Patient reports not on thickened liquids and not eating chopped foods. Patient reports she is eating regular consistency. Own teeth/Dentures/Implants:own teeth and few missing teeth    Adequate Funds for Food:has adequate funds for food    Grocery Shops:Patient and  grocery shop at grocery stores. Cooking Skills:Patient cooks. Reports  can make simple food items. 24 hour Dietary Recall:    Prior Breakfast: 6:30 am- 8:00 am, at home, 1 cup instant regular plain coffee  Breakfast: follows after coffee, at home, 1 bowl cereal ( cold cereal with 2% milk) and 1 fresh peach  AM snack: none  Lunch: 1:00 pm,  At home, 1/2 peanut butter sandwich ( on oatmeal bread, no jelly) and 1 banana, 8 ounces 2% milk  PM snack: none  Dinner: 5:30 pm,  carry out, 1 1/2 cups  aidee hair pasta, 2 meatball and 1 small green salad with dressing and 2 pieces [place] bread with I can't believe it's not butter margarine, 8 ounces 2% milk  Evening snack: none    Patient reports she drinks one can Boost Daily. Frequency of Eating at [employer] during week:carry out, 2 to 3 times per week including drive thru     Frequency of Eating at [employer] during week: see information listed above    Frequency and Type of Caffeine Consumption on a daily basis:1 cup instant regular plain coffee     Water consumption on a daily basis:at least 16.9 ounce bottled unflavored water plus additional water with medication     Frequency of Pop/Soda consumed:none     Ht Readings from Last 1 Encounters:   09/13/21 5' 2\" (1.575 m)     Wt Readings from Last 3 Encounters:   09/13/21 87 lb 3.2 oz (39.6 kg)   08/24/21 89 lb (40.4 kg)   08/04/21 90 lb 9.6 oz (41.1 kg)     Body mass index is 15.95 kg/m².      Waist circumference:deferred  Body Fat %:deferred    Diagnosis:  Severe Malnutrition in the context of chronic illness related to cardiac dysfunction  as evidenced by < 75% of energy intake compared to estimated energy needs for > 1 month, severe weight loss of >7.5% in 3 months  subcutaneous fat loss, severe muscle mass loss. Intervention:    RD 1:1 initial assessment and education    Meet estimated needs. Gain weight. Adequate hydration. Increased knowledge. Improved compliance. Follow individualized diet plan. Education provided:Medical Nutrition Therapy (MNT) Heart Healthy Low Sodium, My Plate, Dietary Resolutions, Serving Sizes, MNT for underweight, 3 day food log and Rate Your Plate score. ( score 52). Interdisciplinary Treatment Plan (ITP) note: Patient is a phase II participant and ITP done by exercise physiology staff. Monitoring/Evaluation:  RD will monitor PO intake and weight data as available. The goals set by the patient are to not skip meals, eat 3 meals per day and evening snack initially, continue to drink one can Boost or equivalent daily, continue to drink 2% milk, increase water consumption and complete 3 day food log in order to gain one pound over on month. Patient will be followed up on September 27, 2021. Kelsey Huber M.A.,R.D., L.D. Contact information: [phone]. Addendum: 9/13/2021 3026 Nutrition: Patient educated verbally on Malnutrition and BMI. See education section for further details. Will remain available.  Electronically signed by Freddy Holliday RD, LD on 9/13/21 at 3:44 PM EDT

## 2021-09-24 ENCOUNTER — TELEPHONE (OUTPATIENT)
Dept: CARDIAC REHAB | Age: 71
End: 2021-09-24

## 2021-09-24 NOTE — TELEPHONE ENCOUNTER
9/24/2021 1324 Nutrition: Left voice message on this date regarding appointment scheduled for 9/27/2021 at 12 noon. Will remain available.  Electronically signed by Bull Plascencia, DEANA, LD on 9/24/21 at 1:24 PM EDT

## 2021-09-27 ENCOUNTER — HOSPITAL ENCOUNTER (OUTPATIENT)
Dept: CARDIAC REHAB | Age: 71
Discharge: HOME | End: 2021-09-27

## 2021-09-27 ENCOUNTER — HOSPITAL ENCOUNTER (OUTPATIENT)
Dept: CARDIAC REHAB | Age: 71
Setting detail: THERAPIES SERIES
Discharge: HOME | End: 2021-09-27

## 2021-09-27 VITALS — WEIGHT: 88 LBS | HEIGHT: 62 IN | BODY MASS INDEX: 16.2 KG/M2

## 2021-09-27 NOTE — PROGRESS NOTES
Outpatient Dietitian Follow Up   9/27/2021    Darlyn Kinney 73 y.o. female    PCP:   Phi Johnson, APRN - CNP    Assessment:  Subjective data:Patient in for follow up weight gain education. Patient reports she thinks she gained weight; is using excel spread sheet to record intake, and trying to eat meals and snacks as suggestive; is hungry. Objective data:   Patient reports negative for COVID-19 symptoms. Appearance:Patient appears to have small frame size and short stature, wears glasses, evident severe muscle and fat wasting. Goals reviewed. Patient reports has good appetite but food does not taste the same. Patient is eating most meals and snacks. Patient is eating breakfast, lunch and dinner. Patient reports she is going to try to make food have additional calories by adding sauces and margarines/fats. Goals in progress and goals on going. Patient repots she is eating Boost daily. Goal met and goal revised to increase supplement to twice per day. Goal on going. Patient reports she is recording food intake using excel spread sheet. Patient and RD discussed including water and supplement. Goal in progress and goal on going. Patient reports she is drinking2% milk. Goal met and goal on going. Patient reports she is drinking one bottled water. Takes diuretic as prescribed but struggles increasing water consumption. Discussed importance of hydration and work towards 64 ounces water per day. Ggoal in progress and goal on going. Patient did gain one pound over 2 week period indicating less than 1% weight gain. Weight gain beneficial. Patient reports she is more active at home. Goal in progress and goal on going. 24 hour Dietary Recall:  Breakfast:time?, at home,  coffee with 1/2 and 1/2, 1 banana, 1 slice toasted oatmeal bread with  I can't believe it is not butter spread, 1/2 hard boiled egg  AM snack: time?, at home, yogurt ( type? Amount?) and Boost ( type? Amount?  Flavor?)  Lunch: time?, at home, peanut butter on Oatmeal bread ( 1/2 sandwich) and 1/2 cup applesauce? PM snack: time?, at home, Butterscotch pudding ( amount?)  Dinner: time?, out, 1/2 Michael Pina Slow Roasted beef pot roast with mashed potatoes, carrots, onions, gravy, biscuit with vegetable spread, 8 ounces apple cider  Evening snack: none    Ht Readings from Last 1 Encounters:   09/27/21 5' 2\" (1.575 m)     Wt Readings from Last 3 Encounters:   09/27/21 88 lb (39.9 kg)   09/13/21 87 lb 3.2 oz (39.6 kg)   08/24/21 89 lb (40.4 kg)     Body mass index is 16.1 kg/m². Waist circumference:deferred  Body Fat %:deferred    Diagnosis:(reviewed)  Severe Malnutrition in the context of chronic illness related to cardiac dysfunction  as evidenced by < 75% of energy intake compared to estimated energy needs for > 1 month, severe weight loss of >7.5% in 3 months  subcutaneous fat loss, severe muscle mass loss. (update: BMI increased slightly)     Intervention:    RD 1:1 follow up visit. Continue to meet estimated needs for repletion. Continue to try to gain weight. Continued adequate hydration. Continued increased knowledge. Continued improved compliance. Continue to follow individualized diet plan. Education provided:Heart Healthy Label reading. Interdisciplinary Treatment Plan (ITP) note: Patient is a phase II participant  and ITP done per policy by exercise physiology staff. Monitoring/Evaluation:  RD will monitor PO intake and weight data as available. The goals set by the patient are to continue to eat 3 meals per day, drink oral nutrition supplement per pt choice twice per day as tolerated in between meals and continue to complete 3 day food log, increase water consumption in order to gain one pound per month. The patient will be followed on October 11, 2021. Kelsey Shaikh M.A.,R.D., L.D. Contact information: [phone].

## 2021-10-01 DIAGNOSIS — I25.10 CAD IN NATIVE ARTERY: ICD-10-CM

## 2021-10-07 PROCEDURE — 9900000038 HC CARDIAC REHAB PHASE 3 - MONTHLY

## 2021-10-08 ENCOUNTER — TELEPHONE (OUTPATIENT)
Dept: CARDIAC REHAB | Age: 71
End: 2021-10-08

## 2021-10-08 NOTE — TELEPHONE ENCOUNTER
10/8/2021 1333 Nutrition: Left voice message on this date regarding appointment for 10/11/2021 at 11:30 am. Will remain available.  Electronically signed by Lurdes Ellison RD, ESVIN on 10/8/21 at 1:34 PM EDT

## 2021-10-11 ENCOUNTER — HOSPITAL ENCOUNTER (OUTPATIENT)
Dept: CARDIAC REHAB | Age: 71
Discharge: HOME | End: 2021-10-11

## 2021-10-11 VITALS — BODY MASS INDEX: 16.3 KG/M2 | WEIGHT: 88.6 LBS | HEIGHT: 62 IN

## 2021-10-11 NOTE — PROGRESS NOTES
Outpatient Dietitian Follow Up/Discharge  10/11/2021    Alisha Courtney 73 y.o. female    PCP:   NOMAN Panda CNP    Assessment:  Subjective data:Patient in for weight loss counseling. Patient reports no change in weight. RD answered patient questions about water consumption. Patient verbalized understanding. Patient stated she is appreciative of nutrition information due to she has always been small size and understands the importance of eating for her own health and in order to be able to take care of her  and in the age of COVID-25. Objective data:   Patient attest to negative for COVID-19 symptoms. Appearance: Patient has short stature and small frame size, wears glasses, pale appearance with evident muscle and fat wasting. Goals reviewed. Patient reports she is eating 3 meals per day and having snack in between meals. Goal in progress and goal on going for discharge. Patient reports she is drinking one 8 ounce Boost Drink day but struggles drinking beverage twice per day but willing to try Patient advised to increase Boost to two times per day. Patient reports she will try but cost is a factor. Patient verbalized understanding. Goal in progress and goal on going for discharge. Patient did complete 2 day food record instead of 3 days. Patient did list fluids separately in order to assess intake and make adjustment on food recall sheet. Patient is drinking 44 to 55.5 ounces fluid/day including 8 ounces water. Patient needs to increase water consumption of 3.5 to 4 bottles per day Patient verbalized understanding. Food record for RD discontinued but patient advised to continue food record for herself if it assist in adherence. Patient advised to continue to liberalized diet in order for weight gain to occur and once reaches BMI 18.4 than patient can follow cardiac guidelines. Patient verbalized understanding. Goal in progress and goal on going for discharge.     Patient weight same over 2 weeks at 88 pounds. No further weight loss. RD and patient discussed revised weight goal of 100 pounds and/or BMI 18.4. Patient verbalized understanding. Goal in progress and  goal on going for discharge. 24 hour Dietary Recall:    Breakfast: 8:30 am, at home, 1 cup honey bunches of oats with 2% milk, 4 ounces grape juice, 1 cup regular instant coffee, 1/2 Tablesppon half and half, 1 small hard boiled egg ( alternate: 1 cup regular instant coffee , .5 ounces half and half 1 boost, 1 cinnamon raisin toast with 1 1/2 tsp of I can not believe it;s not butter, and 3/8 cream cheese, 2 ounces raw pecans)   AM snack: none  Lunch: time?, at home, 4 ounces apple sauce, 6 Almonds, 3 Tablespoons peanut butter, 1/2 slice Oatmeal Bread, 1/2 large Carrot stick, 4 quarters canned peaches, 8 ounces 2% milk  PM snack: time?, at home 1 cup butterscotch pudding with 2 Tablespoons Whipped Cream  Dinner: time?, at home, Pork/Vegetable Stew, 1 1/2 cups eaten ( made with 1 pound  pork loin , 1 teaspoon vegetable oil, 13 ounces Chicken Broth Low Sodium, 1 Tablespoon Tomato Paste, 1/2 teaspoon Garlic Powder, 1/2 teaspoon Dried Thyme Leaves, 2 cups Carrots, 2 cups Potatoes, 1 cup water, 2 cups Frozen Peas and Carrots,  Beverage? Evening snack: time?, at home, 1 scoop butter pecan ice cream    Ht Readings from Last 1 Encounters:   10/11/21 5' 1.5\" (1.562 m)     Wt Readings from Last 3 Encounters:   10/11/21 88 lb 9.6 oz (40.2 kg)   09/27/21 88 lb (39.9 kg)   09/13/21 87 lb 3.2 oz (39.6 kg)     Body mass index is 16.47 kg/m². Waist circumference:deferred  Body Fat %:deferred    Diagnosis:(reviewed)  Severe Malnutrition in the context of chronic illness related to cardiac dysfunction  as evidenced by < 75% of energy intake compared to estimated energy needs for > 1 month, severe weight loss of >7.5% in 3 months  subcutaneous fat loss, severe muscle mass loss.  ( update: BMI continues to increased slightly) Intervention:    RD 1:1 follow up and education    Continue to meet estimated needs. Continue to try to gain weight. Continued adequate hydration. Continued improved compliance. Continue to follow individualized diet plan. Continued increased knowledge. Education provided:Medical Nutrition Therapy High Calorie and High Protein information. Declined need to complete second rate your plate form. Interdisciplinary Treatment Plan (ITP) note: Patient is a phase II participant and ITP done by exercise physiologist staff. Monitoring/Evaluation:  The patient will be followed up per pt, PRN. Kelsey Tapia M.A.,R.D., L.D. Contact information: [phone].

## 2021-10-25 ENCOUNTER — HOSPITAL ENCOUNTER (OUTPATIENT)
Dept: CARDIAC REHAB | Age: 71
Discharge: HOME | End: 2021-10-25

## 2021-10-26 DIAGNOSIS — I25.10 CAD IN NATIVE ARTERY: ICD-10-CM

## 2021-11-16 ASSESSMENT — LIFESTYLE VARIABLES
AUDIT-C TOTAL SCORE: INCOMPLETE
HOW OFTEN DO YOU HAVE A DRINK CONTAINING ALCOHOL: 0
AUDIT TOTAL SCORE: INCOMPLETE
HOW OFTEN DO YOU HAVE A DRINK CONTAINING ALCOHOL: NEVER

## 2021-11-16 ASSESSMENT — PATIENT HEALTH QUESTIONNAIRE - PHQ9
2. FEELING DOWN, DEPRESSED OR HOPELESS: 0
1. LITTLE INTEREST OR PLEASURE IN DOING THINGS: 0
SUM OF ALL RESPONSES TO PHQ9 QUESTIONS 1 & 2: 0
SUM OF ALL RESPONSES TO PHQ QUESTIONS 1-9: 0

## 2021-11-19 DIAGNOSIS — I25.10 CAD IN NATIVE ARTERY: ICD-10-CM

## 2021-11-19 DIAGNOSIS — E78.2 MIXED HYPERLIPIDEMIA: ICD-10-CM

## 2021-11-19 RX ORDER — CLOPIDOGREL BISULFATE 75 MG/1
75 TABLET ORAL DAILY
Qty: 30 TABLET | Refills: 3 | Status: SHIPPED
Start: 2021-11-19 | End: 2021-12-19 | Stop reason: SDUPTHER

## 2021-11-19 RX ORDER — ATORVASTATIN CALCIUM 80 MG/1
80 TABLET, FILM COATED ORAL DAILY
Qty: 30 TABLET | Refills: 3 | Status: SHIPPED
Start: 2021-11-19 | End: 2021-12-19 | Stop reason: SDUPTHER

## 2021-11-19 RX ORDER — FAMOTIDINE 20 MG/1
20 TABLET, FILM COATED ORAL 2 TIMES DAILY
Qty: 60 TABLET | Refills: 3 | Status: SHIPPED
Start: 2021-11-19 | End: 2022-03-18 | Stop reason: SDUPTHER

## 2021-11-22 ENCOUNTER — TELEPHONE (OUTPATIENT)
Dept: CARDIAC REHAB | Age: 71
End: 2021-11-22

## 2021-11-22 NOTE — TELEPHONE ENCOUNTER
LVM for pt to return call regarding attendance to outpatient cardiac rehab.  No call/no show since 10/29/2021

## 2021-11-23 ENCOUNTER — OFFICE VISIT (OUTPATIENT)
Dept: PRIMARY CARE CLINIC | Age: 71
End: 2021-11-23
Payer: MEDICARE

## 2021-11-23 VITALS
HEART RATE: 52 BPM | BODY MASS INDEX: 17.02 KG/M2 | SYSTOLIC BLOOD PRESSURE: 100 MMHG | DIASTOLIC BLOOD PRESSURE: 60 MMHG | HEART RATE: 52 BPM | TEMPERATURE: 97.5 F | OXYGEN SATURATION: 100 % | RESPIRATION RATE: 16 BRPM | WEIGHT: 95.5 LBS | BODY MASS INDEX: 17.57 KG/M2 | DIASTOLIC BLOOD PRESSURE: 60 MMHG | HEIGHT: 62 IN | TEMPERATURE: 97.5 F | WEIGHT: 92.5 LBS | HEIGHT: 62 IN | OXYGEN SATURATION: 100 % | RESPIRATION RATE: 16 BRPM | SYSTOLIC BLOOD PRESSURE: 100 MMHG

## 2021-11-23 DIAGNOSIS — E78.2 MIXED HYPERLIPIDEMIA: ICD-10-CM

## 2021-11-23 DIAGNOSIS — Z23 INFLUENZA VACCINE NEEDED: ICD-10-CM

## 2021-11-23 DIAGNOSIS — Z71.89 ADVANCE CARE PLANNING: ICD-10-CM

## 2021-11-23 DIAGNOSIS — I25.10 CAD IN NATIVE ARTERY: Primary | ICD-10-CM

## 2021-11-23 DIAGNOSIS — Z00.00 ROUTINE GENERAL MEDICAL EXAMINATION AT A HEALTH CARE FACILITY: Primary | ICD-10-CM

## 2021-11-23 PROBLEM — I10 HYPERTENSION: Status: ACTIVE | Noted: 2021-06-17

## 2021-11-23 PROBLEM — E78.5 HYPERLIPIDEMIA, UNSPECIFIED: Status: ACTIVE | Noted: 2021-06-17

## 2021-11-23 PROBLEM — J44.9 CHRONIC OBSTRUCTIVE PULMONARY DISEASE, UNSPECIFIED (HCC): Status: ACTIVE | Noted: 2021-06-17

## 2021-11-23 PROCEDURE — 90694 VACC AIIV4 NO PRSRV 0.5ML IM: CPT | Performed by: NURSE PRACTITIONER

## 2021-11-23 PROCEDURE — 1123F ACP DISCUSS/DSCN MKR DOCD: CPT | Performed by: NURSE PRACTITIONER

## 2021-11-23 PROCEDURE — G0438 PPPS, INITIAL VISIT: HCPCS | Performed by: NURSE PRACTITIONER

## 2021-11-23 PROCEDURE — G8427 DOCREV CUR MEDS BY ELIG CLIN: HCPCS | Performed by: NURSE PRACTITIONER

## 2021-11-23 PROCEDURE — 3017F COLORECTAL CA SCREEN DOC REV: CPT | Performed by: NURSE PRACTITIONER

## 2021-11-23 PROCEDURE — G8419 CALC BMI OUT NRM PARAM NOF/U: HCPCS | Performed by: NURSE PRACTITIONER

## 2021-11-23 PROCEDURE — 4040F PNEUMOC VAC/ADMIN/RCVD: CPT | Performed by: NURSE PRACTITIONER

## 2021-11-23 PROCEDURE — G0008 ADMIN INFLUENZA VIRUS VAC: HCPCS | Performed by: NURSE PRACTITIONER

## 2021-11-23 PROCEDURE — G8484 FLU IMMUNIZE NO ADMIN: HCPCS | Performed by: NURSE PRACTITIONER

## 2021-11-23 PROCEDURE — 1036F TOBACCO NON-USER: CPT | Performed by: NURSE PRACTITIONER

## 2021-11-23 PROCEDURE — G8400 PT W/DXA NO RESULTS DOC: HCPCS | Performed by: NURSE PRACTITIONER

## 2021-11-23 PROCEDURE — 99213 OFFICE O/P EST LOW 20 MIN: CPT | Performed by: NURSE PRACTITIONER

## 2021-11-23 PROCEDURE — 1090F PRES/ABSN URINE INCON ASSESS: CPT | Performed by: NURSE PRACTITIONER

## 2021-11-23 ASSESSMENT — PATIENT HEALTH QUESTIONNAIRE - PHQ9
1. LITTLE INTEREST OR PLEASURE IN DOING THINGS: 0
SUM OF ALL RESPONSES TO PHQ QUESTIONS 1-9: 0
SUM OF ALL RESPONSES TO PHQ9 QUESTIONS 1 & 2: 0
2. FEELING DOWN, DEPRESSED OR HOPELESS: 0

## 2021-11-23 ASSESSMENT — ENCOUNTER SYMPTOMS: GASTROINTESTINAL NEGATIVE: 1

## 2021-11-23 ASSESSMENT — LIFESTYLE VARIABLES: HOW OFTEN DO YOU HAVE A DRINK CONTAINING ALCOHOL: 0

## 2021-11-23 NOTE — PROGRESS NOTES
Subjective  CC: Patient presents to follow-up. HPI: This is a patient with a history of coronary artery disease. She had CABG x4 earlier this year. She has completed cardiac rehab at this point in time, she is looking into getting a treadmill. Unfortunately, she has not been able to gain the weight back that she lost before her surgery. She has been working with the nutritionist.  It is very difficult to eat a heart healthy diet and to gain weight. The patient is doing well on her current medications. There was some concern about her elevated LFTs postoperatively, but on recheck these looked good. Her alkaline phosphatase did remain slightly elevated which we will continue to monitor. Lipids have been extremely well controlled. She will see cardiology in January. We discussed preventative measures. The patient is willing to get an influenza vaccine, she states that in the middle of next year she will be more open to other screenings including cancer screenings, immunizations, and bone density scan. She states she has had one previously which \"was not good\" and I discussed with her the risks of osteoporosis left untreated. ROS:  Review of Systems   Constitutional:        Weight loss of about 15-20lbs with surgery   HENT: Negative. Eyes:        Wears glasses, dry eyes   Respiratory:        ? COPD   Cardiovascular:        CABGx4   Gastrointestinal: Negative. Genitourinary: Negative. Musculoskeletal:        Bilateral hip pain   Skin: Negative. Neurological: Negative.     Psychiatric/Behavioral:        Insomnia          Current Outpatient Medications:     metoprolol tartrate (LOPRESSOR) 25 MG tablet, Take 1 tablet by mouth 2 times daily, Disp: 60 tablet, Rfl: 3    clopidogrel (PLAVIX) 75 MG tablet, Take 1 tablet by mouth daily, Disp: 30 tablet, Rfl: 3    atorvastatin (LIPITOR) 80 MG tablet, Take 1 tablet by mouth daily, Disp: 30 tablet, Rfl: 3    famotidine (PEPCID) 20 MG tablet, Take 1 Speech is articulate and fluent. Maya Snow was seen today for 3 month follow-up. Diagnoses and all orders for this visit:    CAD in native artery  -     metoprolol tartrate (LOPRESSOR) 25 MG tablet; Take 1 tablet by mouth 2 times daily    Influenza vaccine needed  -     INFLUENZA, QUADV, ADJUVANTED, 65 YRS =, IM, PF, PREFILL SYR, 0.5ML (FLUAD)    Advance care planning  -     [facility] Referral to ACP Clinical Specialist    Mixed hyperlipidemia       Care Plan:  Annual wellness visit was completed today. The patient would like a referral to ACP specialist so this will be made. I will plan to see her back in 6 months and we will do lab work, discuss further cancer screenings and immunizations at that time.

## 2021-11-23 NOTE — PROGRESS NOTES
Laterality Date    CARDIAC SURGERY N/A 06/09/2021    HEART POST OP HEMORRHAGE CONTROL performed by Mimi Arriaga DO at [address] GRAFT N/A 06/09/2021    CABG CORONARY ARTERY BYPASS, LÁZARO QUAN performed by Mimi Arriaga DO at [address] [facility]       Family History   Problem Relation Age of Onset    Heart Failure Mother     COPD Mother     Other Father         MVA    Alcohol Abuse Father     Lupus Sister         drug addiction (opiates)    Schizophrenia Sister         acetaminophen OD    Heart Attack Paternal Grandmother     Cancer Paternal Grandfather        CareTeam (Including outside providers/suppliers regularly involved in providing care):   Patient Care Team:  NOMAN Shanks CNP as PCP - General (Family Medicine)  NOMAN Shanks CNP as PCP - [facility] Empaneled Provider    Wt Readings from Last 3 Encounters:   11/23/21 95 lb 8 oz (43.3 kg)   11/23/21 92 lb 8 oz (42 kg)   10/11/21 88 lb 9.6 oz (40.2 kg)     Vitals:    11/23/21 1106   BP: 100/60   Pulse: 52   Resp: 16   Temp: 97.5 °F (36.4 °C)   SpO2: 100%   Weight: 95 lb 8 oz (43.3 kg)   Height: 5' 2\" (1.575 m)     Body mass index is 17.47 kg/m². Based upon direct observation of the patient, evaluation of cognition reveals recent and remote memory intact. Patient's complete Health Risk Assessment and screening values have been reviewed and are found in Flowsheets. The following problems were reviewed today and where indicated follow up appointments were made and/or referrals ordered. Positive Risk Factor Screenings with Interventions:     Fall Risk:  Timed Up and Go Test > 12 seconds?  (Complete if either Fall Risk answers are Yes): (!) yes  2 or more falls in past year?: no  Fall with injury in past year?: no  Fall Risk Interventions:    · Home safety tips provided        General Health and ACP:  General  In general, how would you say your health is?: Good  In the past 7 days, have you Moderna series) 10/09/2021    A1C test (Diabetic or Prediabetic)  06/08/2022    Lipid screen  06/14/2022    Potassium monitoring  08/24/2022    Creatinine monitoring  08/24/2022    Hepatitis C screen  Completed    Hepatitis A vaccine  Aged Out    Hepatitis B vaccine  Aged Out    Hib vaccine  Aged Out    Meningococcal (ACWY) vaccine  Aged Out     Recommendations for [employer] Due: see orders and patient instructions/AVS.    Recommended screening schedule for the next 5-10 years is provided to the patient in written form: see Patient Instructions/AVS.    There are no diagnoses linked to this encounter.

## 2021-11-23 NOTE — PATIENT INSTRUCTIONS
Personalized Preventive Plan for Xavier Cline - 11/23/2021  Medicare offers a range of preventive health benefits. Some of the tests and screenings are paid in full while other may be subject to a deductible, co-insurance, and/or copay. Some of these benefits include a comprehensive review of your medical history including lifestyle, illnesses that may run in your family, and various assessments and screenings as appropriate. After reviewing your medical record and screening and assessments performed today your provider may have ordered immunizations, labs, imaging, and/or referrals for you. A list of these orders (if applicable) as well as your Preventive Care list are included within your After Visit Summary for your review. Other Preventive Recommendations:    · A preventive eye exam performed by an eye specialist is recommended every 1-2 years to screen for glaucoma; cataracts, macular degeneration, and other eye disorders. · A preventive dental visit is recommended every 6 months. · Try to get at least 150 minutes of exercise per week or 10,000 steps per day on a pedometer . · Order or download the FREE \"Exercise & Physical Activity: Your Everyday Guide\" from [employer]. Call 8-285.320.3447 or search [employer] online. · You need 9914-0571 mg of calcium and 0400-9099 IU of vitamin D per day. It is possible to meet your calcium requirement with diet alone, but a vitamin D supplement is usually necessary to meet this goal.  · When exposed to the sun, use a sunscreen that protects against both UVA and UVB radiation with an SPF of 30 or greater. Reapply every 2 to 3 hours or after sweating, drying off with a towel, or swimming. · Always wear a seat belt when traveling in a car. Always wear a helmet when riding a bicycle or motorcycle.

## 2021-11-29 ENCOUNTER — CLINICAL DOCUMENTATION (OUTPATIENT)
Dept: SPIRITUAL SERVICES | Age: 71
End: 2021-11-29

## 2021-11-29 NOTE — ACP (ADVANCE CARE PLANNING)
Advance Care Planning   Ambulatory ACP Specialist Patient Outreach    Date:  11/29/2021  ACP Specialist:  Chaya Sotomayor    Outreach call to patient in follow-up to ACP Specialist referral from: No primary care provider on file. [] PCP  [x] Provider   [] Ambulatory Care Management [] Other for Reason:    [x] Advance Directive Assistance  [] Code Status Discussion  [] Complete Portable DNR Order  [] Discuss Goals of Care  [] Complete POST/MOST  [] Early ACP Decision-Making  [] Other    Date Referral Received:11/23/2021    Today's Outreach:  [x] First   [] Second  [] Third                               Third outreach made by []  phone  [] email []   [employer]     Intervention:  [x] Spoke with Patient  [] Left VM requesting return call      Outcome: Mailed documents to patient. Next Step:   [x] ACP scheduled conversation  [] Outreach again in one week               [] Email / Mail ACP Info Sheets  [] Email / Mail Advance Directive            [] Close Referral. Routing closure to referring provider/staff and to ACP Specialist .      Thank you for this referral.

## 2021-12-19 DIAGNOSIS — E78.2 MIXED HYPERLIPIDEMIA: ICD-10-CM

## 2021-12-19 DIAGNOSIS — I25.10 CAD IN NATIVE ARTERY: ICD-10-CM

## 2021-12-20 RX ORDER — ATORVASTATIN CALCIUM 80 MG/1
80 TABLET, FILM COATED ORAL DAILY
Qty: 30 TABLET | Refills: 3 | Status: SHIPPED
Start: 2021-12-20 | End: 2022-03-18 | Stop reason: SDUPTHER

## 2021-12-20 RX ORDER — CLOPIDOGREL BISULFATE 75 MG/1
75 TABLET ORAL DAILY
Qty: 30 TABLET | Refills: 3 | Status: SHIPPED
Start: 2021-12-20 | End: 2022-03-18 | Stop reason: SDUPTHER

## 2022-01-18 NOTE — ACP (ADVANCE CARE PLANNING)
Advance Care Planning   Ambulatory ACP Specialist Patient Outreach    Date:  01/18/2022  ACP Specialist:  Teto Man    Outreach call to patient in follow-up to ACP Specialist referral from: No primary care provider on file. [] PCP  [] Provider   [] Ambulatory Care Management [x] Other for Reason:    [x] Advance Directive Assistance  [] Code Status Discussion  [] Complete Portable DNR Order  [] Discuss Goals of Care  [] Complete POST/MOST  [] Early ACP Decision-Making  [] Other    Date Referral Received:11/29/21    Today's Outreach:  [] First   [x] Second  [] Third                               Third outreach made by []  phone  [] email []   [employer]     Intervention:  [] Spoke with Patient  [x] Left VM requesting return call      Outcome: A voicemail was left for the patient and documents were sent. Next Step:   [] ACP scheduled conversation  [] Outreach again in one week               [x] Email / Mail ACP Info Sheets  [] Email / Mail Advance Directive            [] Close Referral. Routing closure to referring provider/staff and to ACP Specialist .      Thank you for this referral.

## 2022-01-19 NOTE — ACP (ADVANCE CARE PLANNING)
Advance Care Planning   Ambulatory ACP Specialist Patient Outreach    Date:  1/19/2022  ACP Specialist:  Edwin Mederos    Outreach call to patient in follow-up to ACP Specialist referral from: No primary care provider on file. [] PCP  [] Provider   [] Ambulatory Care Management [x] Other for Reason:    [x] Advance Directive Assistance  [] Code Status Discussion  [] Complete Portable DNR Order  [] Discuss Goals of Care  [] Complete POST/MOST  [] Early ACP Decision-Making  [] Other    Date Referral Received: 11/29/2021    Today's Outreach:  [] First   [] Second  [x] Third                               Third outreach made by [x]  phone  [] email []   [employer]     Intervention:  [x] Spoke with Patient  [] Left VM requesting return call      Outcome: The patient returned my call and informed me that she had completed the ACP documents, will have them notarized, and then give a copy to her doctor. She has decided to do ACP independently. Additionally, I established that her primary healthcare decision maker is her , Parisa Simon. HIs information has been added to her chart. Next Step:   [] ACP scheduled conversation  [] Outreach again in one week               [] Email / Mail ACP Info Sheets  [] Email / Mail Advance Directive            [x] Close Referral. Routing closure to referring provider/staff and to ACP Specialist .      Thank you for this referral.

## 2022-03-18 DIAGNOSIS — I25.10 CAD IN NATIVE ARTERY: ICD-10-CM

## 2022-03-18 DIAGNOSIS — E78.2 MIXED HYPERLIPIDEMIA: ICD-10-CM

## 2022-03-18 RX ORDER — ATORVASTATIN CALCIUM 80 MG/1
80 TABLET, FILM COATED ORAL DAILY
Qty: 30 TABLET | Refills: 3 | Status: SHIPPED
Start: 2022-03-18 | End: 2022-07-11 | Stop reason: SDUPTHER

## 2022-03-18 RX ORDER — FAMOTIDINE 20 MG/1
20 TABLET, FILM COATED ORAL 2 TIMES DAILY
Qty: 60 TABLET | Refills: 3 | Status: SHIPPED
Start: 2022-03-18 | End: 2022-07-11 | Stop reason: SDUPTHER

## 2022-03-18 RX ORDER — CLOPIDOGREL BISULFATE 75 MG/1
75 TABLET ORAL DAILY
Qty: 30 TABLET | Refills: 3 | Status: SHIPPED
Start: 2022-03-18 | End: 2022-07-11 | Stop reason: SDUPTHER

## 2022-04-08 ENCOUNTER — CLINICAL DOCUMENTATION (OUTPATIENT)
Dept: SPIRITUAL SERVICES | Age: 72
End: 2022-04-08

## 2022-04-08 NOTE — ACP (ADVANCE CARE PLANNING)
Advance Care Planning   Ambulatory ACP Specialist Patient Outreach    Date:  4/8/2022  ACP Specialist:  Shellie Lua    Outreach call to patient in follow-up to ACP Specialist referral from: NOMAN Sanabria CNP    [x] PCP  [] Provider   [] Ambulatory Care Management [] Other for Reason:    [x] Advance Directive Assistance  [] Code Status Discussion  [] Complete Portable DNR Order  [] Discuss Goals of Care  [] Complete POST/MOST  [] Early ACP Decision-Making  [] Other    Date Referral Received:4/07/2022    Today's Outreach:  [x] First   [] Second  [] Third                               Third outreach made by []  phone  [] email []   [employer]     Intervention:  [] Spoke with Patient  [] Left VM requesting return call      Outcome: Waiting for return call       Next Step:   [] ACP scheduled conversation  [x] Outreach again in one week               [] Email / Mail [address]  [] Email / Mail Advance Directive            [] Close Referral. Routing closure to referring provider/staff and to ACP Specialist .      Thank you for this referral.

## 2022-04-28 ENCOUNTER — CLINICAL DOCUMENTATION (OUTPATIENT)
Dept: SPIRITUAL SERVICES | Age: 72
End: 2022-04-28

## 2022-04-28 NOTE — ACP (ADVANCE CARE PLANNING)
Advance Care Planning   Advance Care Planning Note  Ambulatory Spiritual Care Services    Date:  4/28/2022    Received request from [facility] Provider. Consultation conversation participants:   Patient who understands ACP conversation     Goals of ACP Conversation:  Facilitate a discussion related to patient's goals of care as they align with the patient's values and beliefs. Health Care Decision Makers:      Primary Decision Maker: Quinton Pabon Spouse - [phone]    Secondary Decision Maker: Richard Stone - Niece/Nephew - [phone]     Summary:  Verified Documents    Advance Care Planning Documents (Patient Wishes)  Currently on file:   Healthcare Power of /Advance Directive Appointment of Health Care Agent    Assessment:   The patient indicated that she will be discussing with her doctor about a DNR. Interventions: The patient has already completed a living will and healthcare POA. They are on file. Care Preferences Communicated:     Hospitalization:  If the patient's health worsens and it becomes clear that the chance of recovery is unlikely,     the patient wants hospitalization. Ventilation: The patient will discuss this with her doctor. If the patient, in their present state of health, suddenly became very ill and unable to breathe on their own,     the patient would NOT desire the use of a ventilator (breathing machine). If their health worsens and it becomes clear that the change of recovery is unlikely,     the patient would NOT desire the use of a ventilator (breathing machine). Resuscitation:  In the event the patient's heart stopped as a result of an underlying serious health condition, the patient communicates a preference for      a natural death (no CPR).     Outcomes:  ACP Discussion: Completed    Patient / Healthcare Decision Maker Instructions:  Review completed ACP document(s) and update, if needed, with changes health or future preferences    Electronically signed by Jessica Byrne on 4/28/2022 at 10:09 AM.

## 2022-05-24 ENCOUNTER — OFFICE VISIT (OUTPATIENT)
Dept: PRIMARY CARE CLINIC | Age: 72
End: 2022-05-24
Payer: MEDICARE

## 2022-05-24 VITALS
WEIGHT: 97.8 LBS | OXYGEN SATURATION: 99 % | DIASTOLIC BLOOD PRESSURE: 68 MMHG | TEMPERATURE: 98.6 F | HEIGHT: 62 IN | BODY MASS INDEX: 18 KG/M2 | HEART RATE: 65 BPM | SYSTOLIC BLOOD PRESSURE: 136 MMHG

## 2022-05-24 DIAGNOSIS — I25.10 CAD IN NATIVE ARTERY: ICD-10-CM

## 2022-05-24 DIAGNOSIS — E78.2 MIXED HYPERLIPIDEMIA: ICD-10-CM

## 2022-05-24 DIAGNOSIS — I10 PRIMARY HYPERTENSION: ICD-10-CM

## 2022-05-24 DIAGNOSIS — J44.9 CHRONIC OBSTRUCTIVE PULMONARY DISEASE, UNSPECIFIED COPD TYPE (HCC): ICD-10-CM

## 2022-05-24 DIAGNOSIS — Z12.11 COLON CANCER SCREENING: ICD-10-CM

## 2022-05-24 DIAGNOSIS — Z23 NEED FOR PNEUMOCOCCAL VACCINE: ICD-10-CM

## 2022-05-24 DIAGNOSIS — I25.10 CAD IN NATIVE ARTERY: Primary | ICD-10-CM

## 2022-05-24 LAB
ALBUMIN SERPL-MCNC: 4.5 G/DL (ref 3.5–5.2)
ALP BLD-CCNC: 149 U/L (ref 35–104)
ALT SERPL-CCNC: 27 U/L (ref 0–32)
ANION GAP SERPL CALCULATED.3IONS-SCNC: 14 MMOL/L (ref 7–16)
AST SERPL-CCNC: 32 U/L (ref 0–31)
BACTERIA: NORMAL /HPF
BASOPHILS ABSOLUTE: 0.11 E9/L (ref 0–0.2)
BASOPHILS RELATIVE PERCENT: 1.5 % (ref 0–2)
BILIRUB SERPL-MCNC: 0.4 MG/DL (ref 0–1.2)
BILIRUBIN URINE: NEGATIVE
BLOOD, URINE: ABNORMAL
BUN BLDV-MCNC: 14 MG/DL (ref 6–23)
CALCIUM SERPL-MCNC: 9.4 MG/DL (ref 8.6–10.2)
CHLORIDE BLD-SCNC: 102 MMOL/L (ref 98–107)
CHOLESTEROL, TOTAL: 156 MG/DL (ref 0–199)
CLARITY: CLEAR
CO2: 24 MMOL/L (ref 22–29)
COLOR: YELLOW
CREAT SERPL-MCNC: 1 MG/DL (ref 0.5–1)
EOSINOPHILS ABSOLUTE: 0.12 E9/L (ref 0.05–0.5)
EOSINOPHILS RELATIVE PERCENT: 1.6 % (ref 0–6)
EPITHELIAL CELLS, UA: NORMAL /HPF
GFR AFRICAN AMERICAN: >60
GFR NON-AFRICAN AMERICAN: 55 ML/MIN/1.73
GLUCOSE BLD-MCNC: 99 MG/DL (ref 74–99)
GLUCOSE URINE: NEGATIVE MG/DL
HCT VFR BLD CALC: 45.7 % (ref 34–48)
HDLC SERPL-MCNC: 47 MG/DL
HEMOGLOBIN: 13.8 G/DL (ref 11.5–15.5)
IMMATURE GRANULOCYTES #: 0.02 E9/L
IMMATURE GRANULOCYTES %: 0.3 % (ref 0–5)
KETONES, URINE: NEGATIVE MG/DL
LDL CHOLESTEROL CALCULATED: 93 MG/DL (ref 0–99)
LEUKOCYTE ESTERASE, URINE: ABNORMAL
LYMPHOCYTES ABSOLUTE: 2.39 E9/L (ref 1.5–4)
LYMPHOCYTES RELATIVE PERCENT: 32.8 % (ref 20–42)
MCH RBC QN AUTO: 28.8 PG (ref 26–35)
MCHC RBC AUTO-ENTMCNC: 30.2 % (ref 32–34.5)
MCV RBC AUTO: 95.4 FL (ref 80–99.9)
MONOCYTES ABSOLUTE: 1.02 E9/L (ref 0.1–0.95)
MONOCYTES RELATIVE PERCENT: 14 % (ref 2–12)
NEUTROPHILS ABSOLUTE: 3.62 E9/L (ref 1.8–7.3)
NEUTROPHILS RELATIVE PERCENT: 49.8 % (ref 43–80)
NITRITE, URINE: NEGATIVE
PDW BLD-RTO: 14.2 FL (ref 11.5–15)
PH UA: 6 (ref 5–9)
PLATELET # BLD: 243 E9/L (ref 130–450)
PMV BLD AUTO: 10.7 FL (ref 7–12)
POTASSIUM SERPL-SCNC: 4.7 MMOL/L (ref 3.5–5)
PROTEIN UA: NEGATIVE MG/DL
RBC # BLD: 4.79 E12/L (ref 3.5–5.5)
RBC UA: NORMAL /HPF (ref 0–2)
RENAL EPITHELIAL, UA: NORMAL /HPF
SODIUM BLD-SCNC: 140 MMOL/L (ref 132–146)
SPECIFIC GRAVITY UA: <=1.005 (ref 1–1.03)
TOTAL PROTEIN: 7.7 G/DL (ref 6.4–8.3)
TRIGL SERPL-MCNC: 79 MG/DL (ref 0–149)
TSH SERPL DL<=0.05 MIU/L-ACNC: 2.19 UIU/ML (ref 0.27–4.2)
UROBILINOGEN, URINE: 0.2 E.U./DL
VLDLC SERPL CALC-MCNC: 16 MG/DL
WBC # BLD: 7.3 E9/L (ref 4.5–11.5)
WBC UA: NORMAL /HPF (ref 0–5)

## 2022-05-24 PROCEDURE — G8427 DOCREV CUR MEDS BY ELIG CLIN: HCPCS | Performed by: NURSE PRACTITIONER

## 2022-05-24 PROCEDURE — 90677 PCV20 VACCINE IM: CPT | Performed by: NURSE PRACTITIONER

## 2022-05-24 PROCEDURE — G8419 CALC BMI OUT NRM PARAM NOF/U: HCPCS | Performed by: NURSE PRACTITIONER

## 2022-05-24 PROCEDURE — 3023F SPIROM DOC REV: CPT | Performed by: NURSE PRACTITIONER

## 2022-05-24 PROCEDURE — 1036F TOBACCO NON-USER: CPT | Performed by: NURSE PRACTITIONER

## 2022-05-24 PROCEDURE — G8400 PT W/DXA NO RESULTS DOC: HCPCS | Performed by: NURSE PRACTITIONER

## 2022-05-24 PROCEDURE — 1123F ACP DISCUSS/DSCN MKR DOCD: CPT | Performed by: NURSE PRACTITIONER

## 2022-05-24 PROCEDURE — 99213 OFFICE O/P EST LOW 20 MIN: CPT | Performed by: NURSE PRACTITIONER

## 2022-05-24 PROCEDURE — 1090F PRES/ABSN URINE INCON ASSESS: CPT | Performed by: NURSE PRACTITIONER

## 2022-05-24 PROCEDURE — 3017F COLORECTAL CA SCREEN DOC REV: CPT | Performed by: NURSE PRACTITIONER

## 2022-05-24 ASSESSMENT — ENCOUNTER SYMPTOMS: GASTROINTESTINAL NEGATIVE: 1

## 2022-05-24 NOTE — PROGRESS NOTES
Subjective  CC: Patient presents to follow-up. HPI: The patient is continue to follow with cardiology, Bumex was discontinued earlier this year. The patient states she has felt well from a cardiac standpoint. She has gained a few more pounds which is of great importance to her. We reviewed preventative measures, she is willing to do a few things at this time including getting pneumonia vaccine and Cologuard testing. We will continue to work on these things. The patient otherwise states she has felt well. ROS:  Review of Systems   Constitutional:        Weight loss of about 15-20lbs with CABG, slowly returning to baseline   HENT: Negative. Eyes:        Wears glasses, dry eyes   Respiratory:        ? COPD   Cardiovascular:        CABGx4   Gastrointestinal: Negative. Genitourinary: Negative. Musculoskeletal:        Bilateral hip pain   Skin: Negative. Neurological: Negative.     Psychiatric/Behavioral:        Insomnia          Current Outpatient Medications:     famotidine (PEPCID) 20 MG tablet, Take 1 tablet by mouth 2 times daily, Disp: 60 tablet, Rfl: 3    metoprolol tartrate (LOPRESSOR) 25 MG tablet, Take 1 tablet by mouth 2 times daily, Disp: 60 tablet, Rfl: 3    clopidogrel (PLAVIX) 75 MG tablet, Take 1 tablet by mouth daily, Disp: 30 tablet, Rfl: 3    atorvastatin (LIPITOR) 80 MG tablet, Take 1 tablet by mouth daily, Disp: 30 tablet, Rfl: 3    nitroGLYCERIN (NITROSTAT) 0.4 MG SL tablet, Place 0.4 mg under the tongue every 5 minutes as needed , Disp: , Rfl:     Blood Pressure KIT, 1 kit by Does not apply route 2 times daily, Disp: 1 kit, Rfl: 0    aspirin 81 MG chewable tablet, Take 81 mg by mouth daily, Disp: , Rfl:    Allergies   Allergen Reactions    Latex Itching     Swelling, itching, and redness    Tramadol Nausea Only        PMH:  Past Medical History:   Diagnosis Date    CAD in native artery 6/7/2021    COPD (chronic obstructive pulmonary disease) (HCC)     Hyperlipidemia  Hypertension         Objective  Vitals:    05/24/22 1049   BP: 136/68   Pulse: 65   Temp: 98.6 °F (37 °C)   TempSrc: Temporal   SpO2: 99%   Weight: 97 lb 12.8 oz (44.4 kg)   Height: 5' 2\" (1.575 m)      Exam:  Const: Appears healthy, well developed and well nourished. Appears underweight. Eyes: EOMI in both eyes. PERRL. ENMT: Right external auditory canal with cerumen impaction. Left external auditory canal clear. Tympanic membranes are intact. Septum is in the midline. Posterior  pharynx shows no exudate, irritation or redness. Neck: Supple and symmetric. Palpation reveals no adenopathy. No masses appreciated. Thyroid  exhibits no nodule or thyromegaly. No JVD. Resp: Respirations are unlabored. Respiration rate is normal. Auscultate good airflow. No rales,  rhonchi or wheezes appreciated over the lungs bilaterally. CV: Rhythm is regular. S1 is normal. S2 is normal.  Extremities: No clubbing or cyanosis. Musculo: Walks with a normal gait. Upper Extremities: Full ROM bilaterally. Lower Extremities: Full  ROM bilaterally. Skin: Dry and warm with no rash. Neuro: Alert and oriented x3. Mood is normal. Affect is normal. Speech is articulate and fluent. Rocio Almeida was seen today for follow-up and hyperlipidemia. Diagnoses and all orders for this visit:    CAD in native artery  -     CBC with Auto Differential; Future  -     Comprehensive Metabolic Panel; Future  -     Lipid Panel; Future  -     TSH; Future  -     Urinalysis; Future    Chronic obstructive pulmonary disease, unspecified COPD type ([facility] [address]    Mixed hyperlipidemia  -     Lipid Panel; Future    Primary hypertension  -     CBC with Auto Differential; Future  -     Comprehensive Metabolic Panel; Future  -     Lipid Panel; Future  -     TSH; Future  -     Urinalysis;  Future    Colon cancer screening  -     Fecal DNA Colorectal cancer screening (Cologuard)    Need for pneumococcal vaccine  -     Pneumococcal, PREVNAR 20, PCV20, (age 25 yrs+), IM, PF       Care Plan:  Patient declines cerumen removal.  Recommend over-the-counter and conservative therapies. Lab work will be obtained today. I will plan to see her back in 6 months.

## 2022-06-01 RX ORDER — ALBUTEROL SULFATE 90 UG/1
2 AEROSOL, METERED RESPIRATORY (INHALATION) 4 TIMES DAILY PRN
Qty: 18 G | Refills: 5 | Status: SHIPPED | OUTPATIENT
Start: 2022-06-01

## 2022-06-02 LAB — NONINV COLON CA DNA+OCC BLD SCRN STL QL: NEGATIVE

## 2022-07-11 DIAGNOSIS — I25.10 CAD IN NATIVE ARTERY: ICD-10-CM

## 2022-07-11 DIAGNOSIS — E78.2 MIXED HYPERLIPIDEMIA: ICD-10-CM

## 2022-07-11 RX ORDER — FAMOTIDINE 20 MG/1
20 TABLET, FILM COATED ORAL 2 TIMES DAILY
Qty: 60 TABLET | Refills: 3 | Status: SHIPPED
Start: 2022-07-11 | End: 2022-11-02 | Stop reason: SDUPTHER

## 2022-07-11 RX ORDER — ATORVASTATIN CALCIUM 80 MG/1
80 TABLET, FILM COATED ORAL DAILY
Qty: 30 TABLET | Refills: 3 | Status: SHIPPED
Start: 2022-07-11 | End: 2022-11-02 | Stop reason: SDUPTHER

## 2022-07-11 RX ORDER — CLOPIDOGREL BISULFATE 75 MG/1
75 TABLET ORAL DAILY
Qty: 30 TABLET | Refills: 3 | Status: SHIPPED | OUTPATIENT
Start: 2022-07-11

## 2022-08-15 RX ORDER — NITROGLYCERIN 0.4 MG/1
0.4 TABLET SUBLINGUAL EVERY 5 MIN PRN
Qty: 25 TABLET | Refills: 1 | Status: SHIPPED | OUTPATIENT
Start: 2022-08-15

## 2022-08-15 NOTE — TELEPHONE ENCOUNTER
Last Appointment:  5/24/2022  Future Appointments   Date Time Provider Tra Ibrahim   11/29/2022 10:00 AM NOMAN Lorenzo - CNP [facility]

## 2022-11-02 DIAGNOSIS — I25.10 CAD IN NATIVE ARTERY: ICD-10-CM

## 2022-11-02 DIAGNOSIS — E78.2 MIXED HYPERLIPIDEMIA: ICD-10-CM

## 2022-11-02 NOTE — TELEPHONE ENCOUNTER
Last Appointment:  5/24/2022  Future Appointments   Date Time Provider Tra Ibrahim   11/29/2022 10:00 AM NOMAN Sal - CNP [facility]

## 2022-11-03 RX ORDER — ATORVASTATIN CALCIUM 80 MG/1
80 TABLET, FILM COATED ORAL DAILY
Qty: 30 TABLET | Refills: 3 | Status: SHIPPED | OUTPATIENT
Start: 2022-11-03

## 2022-11-03 RX ORDER — FAMOTIDINE 20 MG/1
20 TABLET, FILM COATED ORAL 2 TIMES DAILY
Qty: 60 TABLET | Refills: 3 | Status: SHIPPED | OUTPATIENT
Start: 2022-11-03

## 2022-11-29 ENCOUNTER — OFFICE VISIT (OUTPATIENT)
Dept: PRIMARY CARE CLINIC | Age: 72
End: 2022-11-29
Payer: MEDICARE

## 2022-11-29 VITALS
DIASTOLIC BLOOD PRESSURE: 60 MMHG | SYSTOLIC BLOOD PRESSURE: 110 MMHG | BODY MASS INDEX: 18.55 KG/M2 | HEIGHT: 62 IN | WEIGHT: 100.8 LBS | HEART RATE: 67 BPM | TEMPERATURE: 97.8 F | OXYGEN SATURATION: 97 %

## 2022-11-29 DIAGNOSIS — Z12.31 BREAST CANCER SCREENING BY MAMMOGRAM: ICD-10-CM

## 2022-11-29 DIAGNOSIS — I25.10 CAD IN NATIVE ARTERY: ICD-10-CM

## 2022-11-29 DIAGNOSIS — E04.9 ENLARGED THYROID: ICD-10-CM

## 2022-11-29 DIAGNOSIS — Z23 INFLUENZA VACCINE NEEDED: ICD-10-CM

## 2022-11-29 DIAGNOSIS — I10 PRIMARY HYPERTENSION: Primary | ICD-10-CM

## 2022-11-29 DIAGNOSIS — R73.09 ELEVATED GLUCOSE: ICD-10-CM

## 2022-11-29 DIAGNOSIS — Z78.0 POSTMENOPAUSAL: ICD-10-CM

## 2022-11-29 PROCEDURE — 1090F PRES/ABSN URINE INCON ASSESS: CPT | Performed by: NURSE PRACTITIONER

## 2022-11-29 PROCEDURE — 3074F SYST BP LT 130 MM HG: CPT | Performed by: NURSE PRACTITIONER

## 2022-11-29 PROCEDURE — 1123F ACP DISCUSS/DSCN MKR DOCD: CPT | Performed by: NURSE PRACTITIONER

## 2022-11-29 PROCEDURE — 3017F COLORECTAL CA SCREEN DOC REV: CPT | Performed by: NURSE PRACTITIONER

## 2022-11-29 PROCEDURE — G8419 CALC BMI OUT NRM PARAM NOF/U: HCPCS | Performed by: NURSE PRACTITIONER

## 2022-11-29 PROCEDURE — 3078F DIAST BP <80 MM HG: CPT | Performed by: NURSE PRACTITIONER

## 2022-11-29 PROCEDURE — 99214 OFFICE O/P EST MOD 30 MIN: CPT | Performed by: NURSE PRACTITIONER

## 2022-11-29 PROCEDURE — G8400 PT W/DXA NO RESULTS DOC: HCPCS | Performed by: NURSE PRACTITIONER

## 2022-11-29 PROCEDURE — G8427 DOCREV CUR MEDS BY ELIG CLIN: HCPCS | Performed by: NURSE PRACTITIONER

## 2022-11-29 PROCEDURE — 1036F TOBACCO NON-USER: CPT | Performed by: NURSE PRACTITIONER

## 2022-11-29 PROCEDURE — G8484 FLU IMMUNIZE NO ADMIN: HCPCS | Performed by: NURSE PRACTITIONER

## 2022-11-29 RX ORDER — OXYCODONE HYDROCHLORIDE AND ACETAMINOPHEN 5; 325 MG/1; MG/1
TABLET ORAL
COMMUNITY
Start: 2022-10-30 | End: 2022-11-29

## 2022-11-29 SDOH — ECONOMIC STABILITY: FOOD INSECURITY: WITHIN THE PAST 12 MONTHS, YOU WORRIED THAT YOUR FOOD WOULD RUN OUT BEFORE YOU GOT MONEY TO BUY MORE.: NEVER TRUE

## 2022-11-29 SDOH — ECONOMIC STABILITY: FOOD INSECURITY: WITHIN THE PAST 12 MONTHS, THE FOOD YOU BOUGHT JUST DIDN'T LAST AND YOU DIDN'T HAVE MONEY TO GET MORE.: NEVER TRUE

## 2022-11-29 ASSESSMENT — LIFESTYLE VARIABLES
HOW MANY STANDARD DRINKS CONTAINING ALCOHOL DO YOU HAVE ON A TYPICAL DAY: PATIENT DOES NOT DRINK
HOW MANY STANDARD DRINKS CONTAINING ALCOHOL DO YOU HAVE ON A TYPICAL DAY: 0
HOW OFTEN DO YOU HAVE SIX OR MORE DRINKS ON ONE OCCASION: 1
HOW OFTEN DO YOU HAVE A DRINK CONTAINING ALCOHOL: NEVER
HOW OFTEN DO YOU HAVE A DRINK CONTAINING ALCOHOL: 1

## 2022-11-29 ASSESSMENT — ANXIETY QUESTIONNAIRES
2. NOT BEING ABLE TO STOP OR CONTROL WORRYING: NOT AT ALL
1. FEELING NERVOUS, ANXIOUS, OR ON EDGE: SEVERAL DAYS
GAD7 TOTAL SCORE: 1
1. FEELING NERVOUS, ANXIOUS, OR ON EDGE: 1
6. BECOMING EASILY ANNOYED OR IRRITABLE: NOT AT ALL
3. WORRYING TOO MUCH ABOUT DIFFERENT THINGS: NOT AT ALL
4. TROUBLE RELAXING: 0
IF YOU CHECKED OFF ANY PROBLEMS ON THIS QUESTIONNAIRE, HOW DIFFICULT HAVE THESE PROBLEMS MADE IT FOR YOU TO DO YOUR WORK, TAKE CARE OF THINGS AT HOME, OR GET ALONG WITH OTHER PEOPLE: NOT DIFFICULT AT ALL
IF YOU CHECKED OFF ANY PROBLEMS ON THIS QUESTIONNAIRE, HOW DIFFICULT HAVE THESE PROBLEMS MADE IT FOR YOU TO DO YOUR WORK, TAKE CARE OF THINGS AT HOME, OR GET ALONG WITH OTHER PEOPLE: NOT DIFFICULT AT ALL
5. BEING SO RESTLESS THAT IT IS HARD TO SIT STILL: NOT AT ALL
7. FEELING AFRAID AS IF SOMETHING AWFUL MIGHT HAPPEN: 0
7. FEELING AFRAID AS IF SOMETHING AWFUL MIGHT HAPPEN: NOT AT ALL
6. BECOMING EASILY ANNOYED OR IRRITABLE: 0
5. BEING SO RESTLESS THAT IT IS HARD TO SIT STILL: 0
3. WORRYING TOO MUCH ABOUT DIFFERENT THINGS: 0
4. TROUBLE RELAXING: NOT AT ALL
2. NOT BEING ABLE TO STOP OR CONTROL WORRYING: 0

## 2022-11-29 ASSESSMENT — PATIENT HEALTH QUESTIONNAIRE - PHQ9
2. FEELING DOWN, DEPRESSED OR HOPELESS: 0
SUM OF ALL RESPONSES TO PHQ QUESTIONS 1-9: 0
1. LITTLE INTEREST OR PLEASURE IN DOING THINGS: 0
SUM OF ALL RESPONSES TO PHQ QUESTIONS 1-9: 0
SUM OF ALL RESPONSES TO PHQ9 QUESTIONS 1 & 2: 0

## 2022-11-29 ASSESSMENT — ENCOUNTER SYMPTOMS: GASTROINTESTINAL NEGATIVE: 1

## 2022-11-29 ASSESSMENT — SOCIAL DETERMINANTS OF HEALTH (SDOH): HOW HARD IS IT FOR YOU TO PAY FOR THE VERY BASICS LIKE FOOD, HOUSING, MEDICAL CARE, AND HEATING?: NOT HARD AT ALL

## 2022-11-29 NOTE — PROGRESS NOTES
Subjective  CC: Patient presents to follow-up. HPI: The patient was seen by cardiology, she is now off of dual antiplatelet therapy and is taking a baby aspirin. She has not had any trouble with this. Unfortunately, the patient had a fall at home which was extrinsic, and fractured her left wrist.  She continues to follow with orthopedics, cast is in place today. She hopes to get this off in about 2 weeks. She has already received the pneumonia vaccine. She did have Cologuard completed which was negative. In terms of preventative measures, I will order a mammogram and bone density scan. On exam today, thyromegaly is noted and a thyroid ultrasound will also be ordered. The patient is otherwise doing well. ROS:  Review of Systems   Constitutional:         Weight loss of about 15-20lbs with CABG, slowly returning to baseline   HENT: Negative. Eyes:         Wears glasses, dry eyes   Respiratory:          ? COPD   Cardiovascular:         CABGx4   Gastrointestinal: Negative. Genitourinary: Negative. Musculoskeletal:         Bilateral hip pain   Skin: Negative. Neurological: Negative.     Psychiatric/Behavioral:          Insomnia        Current Outpatient Medications:     Calcium-Phosphorus-Vitamin D (CITRACAL +D3 PO), Take 400 mg by mouth 3 times daily, Disp: , Rfl:     Flaxseed, Linseed, (FLAXSEED OIL PO), Take by mouth, Disp: , Rfl:     famotidine (PEPCID) 20 MG tablet, Take 1 tablet by mouth 2 times daily, Disp: 60 tablet, Rfl: 3    metoprolol tartrate (LOPRESSOR) 25 MG tablet, Take 1 tablet by mouth 2 times daily, Disp: 60 tablet, Rfl: 3    atorvastatin (LIPITOR) 80 MG tablet, Take 1 tablet by mouth daily, Disp: 30 tablet, Rfl: 3    nitroGLYCERIN (NITROSTAT) 0.4 MG SL tablet, Place 1 tablet under the tongue every 5 minutes as needed for Chest pain, Disp: 25 tablet, Rfl: 1    albuterol sulfate HFA (VENTOLIN HFA) 108 (90 Base) MCG/ACT inhaler, Inhale 2 puffs into the lungs 4 times daily as needed for Wheezing, Disp: 18 g, Rfl: 5    Blood Pressure KIT, 1 kit by Does not apply route 2 times daily, Disp: 1 kit, Rfl: 0    aspirin 81 MG chewable tablet, Take 81 mg by mouth daily, Disp: , Rfl:    Allergies   Allergen Reactions    Latex Itching     Swelling, itching, and redness    Tramadol Nausea Only        PMH:  Past Medical History:   Diagnosis Date    CAD in native artery 6/7/2021    COPD (chronic obstructive pulmonary disease) (HCC)     Hyperlipidemia     Hypertension         Objective  Vitals:    11/29/22 0957   BP: 110/60   Pulse: 67   Temp: 97.8 °F (36.6 °C)   TempSrc: Temporal   SpO2: 97%   Weight: 100 lb 12.8 oz (45.7 kg)   Height: 5' 2\" (1.575 m)      Exam:  Const: Appears healthy, well developed and well nourished. Appears underweight. Eyes: EOMI in both eyes. PERRL. ENMT: Right external auditory canal with cerumen impaction. Left external auditory canal clear. Tympanic membranes are intact. Septum is in the midline. Posterior  pharynx shows no exudate, irritation or redness. Neck: Supple and symmetric. Palpation reveals no adenopathy. No masses appreciated. Thyroid exhibits thyromegaly. No JVD. Resp: Respirations are unlabored. Respiration rate is normal. Auscultate good airflow. No rales,  rhonchi or wheezes appreciated over the lungs bilaterally. CV: Rhythm is regular. S1 is normal. S2 is normal.  Extremities: No clubbing or cyanosis. Musculo: Walks with a normal gait. Upper Extremities: Full ROM bilaterally with exception of left upper extremity that is in a cast. Lower Extremities: Full  ROM bilaterally. Skin: Dry and warm with no rash. Neuro: Alert and oriented x3. Mood is normal. Affect is normal. Speech is articulate and fluent. Ynes Umanzor was seen today for hypertension. Diagnoses and all orders for this visit:    Primary hypertension  -     Lipid Panel; Future  -     Comprehensive Metabolic Panel;  Future    Influenza vaccine needed    CAD in native artery  -     Lipid Panel; Future    Enlarged thyroid  -     US THYROID; Future    Elevated glucose  -     Hemoglobin A1C; Future    Postmenopausal  -     DEXA BONE DENSITY 2 SITES; Future    Breast cancer screening by mammogram  -     ELIESER DIGITAL SCREEN W OR WO CAD BILATERAL; Future     Care Plan:  Lab work will be obtained today, order for mammogram, bone density scan, and thyroid ultrasound will be given. Patient will be seen back in 6 months for regular office visit and an annual wellness visit.

## 2022-12-02 DIAGNOSIS — I10 PRIMARY HYPERTENSION: ICD-10-CM

## 2022-12-02 DIAGNOSIS — R73.09 ELEVATED GLUCOSE: ICD-10-CM

## 2022-12-02 DIAGNOSIS — I25.10 CAD IN NATIVE ARTERY: ICD-10-CM

## 2022-12-02 LAB
ALBUMIN SERPL-MCNC: 4.1 G/DL (ref 3.5–5.2)
ALP BLD-CCNC: 130 U/L (ref 35–104)
ALT SERPL-CCNC: 17 U/L (ref 0–32)
ANION GAP SERPL CALCULATED.3IONS-SCNC: 9 MMOL/L (ref 7–16)
AST SERPL-CCNC: 25 U/L (ref 0–31)
BILIRUB SERPL-MCNC: 0.4 MG/DL (ref 0–1.2)
BUN BLDV-MCNC: 16 MG/DL (ref 6–23)
CALCIUM SERPL-MCNC: 9.4 MG/DL (ref 8.6–10.2)
CHLORIDE BLD-SCNC: 103 MMOL/L (ref 98–107)
CHOLESTEROL, TOTAL: 155 MG/DL (ref 0–199)
CO2: 28 MMOL/L (ref 22–29)
CREAT SERPL-MCNC: 1 MG/DL (ref 0.5–1)
GFR SERPL CREATININE-BSD FRML MDRD: >60 ML/MIN/1.73
GLUCOSE BLD-MCNC: 105 MG/DL (ref 74–99)
HBA1C MFR BLD: 6.1 % (ref 4–5.6)
HDLC SERPL-MCNC: 53 MG/DL
LDL CHOLESTEROL CALCULATED: 87 MG/DL (ref 0–99)
POTASSIUM SERPL-SCNC: 4.2 MMOL/L (ref 3.5–5)
SODIUM BLD-SCNC: 140 MMOL/L (ref 132–146)
TOTAL PROTEIN: 6.9 G/DL (ref 6.4–8.3)
TRIGL SERPL-MCNC: 77 MG/DL (ref 0–149)
VLDLC SERPL CALC-MCNC: 15 MG/DL

## 2022-12-16 ENCOUNTER — COMMUNITY OUTREACH (OUTPATIENT)
Dept: PRIMARY CARE CLINIC | Age: 72
End: 2022-12-16

## 2022-12-16 NOTE — PROGRESS NOTES
Patient's HM shows they are overdue for Mammogram.  Care Everywhere and  files searched. No results to attach to order nor HM updated.

## 2023-02-22 DIAGNOSIS — I25.10 CAD IN NATIVE ARTERY: ICD-10-CM

## 2023-02-22 DIAGNOSIS — E78.2 MIXED HYPERLIPIDEMIA: ICD-10-CM

## 2023-02-22 RX ORDER — FAMOTIDINE 20 MG/1
20 TABLET, FILM COATED ORAL 2 TIMES DAILY
Qty: 60 TABLET | Refills: 3 | Status: SHIPPED | OUTPATIENT
Start: 2023-02-22

## 2023-02-22 RX ORDER — ATORVASTATIN CALCIUM 80 MG/1
80 TABLET, FILM COATED ORAL DAILY
Qty: 30 TABLET | Refills: 3 | Status: SHIPPED | OUTPATIENT
Start: 2023-02-22

## 2023-02-22 NOTE — TELEPHONE ENCOUNTER
Last Appointment:  11/29/2022  Future Appointments   Date Time Provider Tra Ibrahim   5/30/2023 10:00 AM Argentine Notice, APRN - CNP [facility]   5/30/2023 10:15 AM Argentine Notice, APRN - CNP [facility]

## 2023-05-27 SDOH — ECONOMIC STABILITY: TRANSPORTATION INSECURITY
IN THE PAST 12 MONTHS, HAS LACK OF TRANSPORTATION KEPT YOU FROM MEETINGS, WORK, OR FROM GETTING THINGS NEEDED FOR DAILY LIVING?: PATIENT DECLINED

## 2023-05-27 SDOH — HEALTH STABILITY: PHYSICAL HEALTH: ON AVERAGE, HOW MANY MINUTES DO YOU ENGAGE IN EXERCISE AT THIS LEVEL?: 40 MIN

## 2023-05-27 SDOH — ECONOMIC STABILITY: FOOD INSECURITY: WITHIN THE PAST 12 MONTHS, THE FOOD YOU BOUGHT JUST DIDN'T LAST AND YOU DIDN'T HAVE MONEY TO GET MORE.: PATIENT DECLINED

## 2023-05-27 SDOH — ECONOMIC STABILITY: INCOME INSECURITY: HOW HARD IS IT FOR YOU TO PAY FOR THE VERY BASICS LIKE FOOD, HOUSING, MEDICAL CARE, AND HEATING?: PATIENT DECLINED

## 2023-05-27 SDOH — ECONOMIC STABILITY: HOUSING INSECURITY
IN THE LAST 12 MONTHS, WAS THERE A TIME WHEN YOU DID NOT HAVE A STEADY PLACE TO SLEEP OR SLEPT IN A SHELTER (INCLUDING NOW)?: PATIENT REFUSED

## 2023-05-27 SDOH — ECONOMIC STABILITY: FOOD INSECURITY: WITHIN THE PAST 12 MONTHS, YOU WORRIED THAT YOUR FOOD WOULD RUN OUT BEFORE YOU GOT MONEY TO BUY MORE.: PATIENT DECLINED

## 2023-05-27 SDOH — HEALTH STABILITY: PHYSICAL HEALTH: ON AVERAGE, HOW MANY DAYS PER WEEK DO YOU ENGAGE IN MODERATE TO STRENUOUS EXERCISE (LIKE A BRISK WALK)?: 3 DAYS

## 2023-05-27 ASSESSMENT — PATIENT HEALTH QUESTIONNAIRE - PHQ9
2. FEELING DOWN, DEPRESSED OR HOPELESS: 0
SUM OF ALL RESPONSES TO PHQ QUESTIONS 1-9: 0
1. LITTLE INTEREST OR PLEASURE IN DOING THINGS: 0
SUM OF ALL RESPONSES TO PHQ9 QUESTIONS 1 & 2: 0
SUM OF ALL RESPONSES TO PHQ QUESTIONS 1-9: 0

## 2023-05-27 ASSESSMENT — LIFESTYLE VARIABLES
HOW MANY STANDARD DRINKS CONTAINING ALCOHOL DO YOU HAVE ON A TYPICAL DAY: PATIENT DOES NOT DRINK
HOW OFTEN DO YOU HAVE A DRINK CONTAINING ALCOHOL: 1
HOW OFTEN DO YOU HAVE A DRINK CONTAINING ALCOHOL: NEVER
HOW MANY STANDARD DRINKS CONTAINING ALCOHOL DO YOU HAVE ON A TYPICAL DAY: 0
HOW OFTEN DO YOU HAVE SIX OR MORE DRINKS ON ONE OCCASION: 1

## 2023-05-30 ENCOUNTER — OFFICE VISIT (OUTPATIENT)
Dept: PRIMARY CARE CLINIC | Age: 73
End: 2023-05-30
Payer: MEDICARE

## 2023-05-30 VITALS
WEIGHT: 97 LBS | HEIGHT: 62 IN | DIASTOLIC BLOOD PRESSURE: 62 MMHG | DIASTOLIC BLOOD PRESSURE: 62 MMHG | HEART RATE: 97 BPM | TEMPERATURE: 98.4 F | BODY MASS INDEX: 17.85 KG/M2 | HEIGHT: 62 IN | HEART RATE: 97 BPM | OXYGEN SATURATION: 97 % | TEMPERATURE: 98.4 F | BODY MASS INDEX: 17.85 KG/M2 | SYSTOLIC BLOOD PRESSURE: 110 MMHG | OXYGEN SATURATION: 97 % | WEIGHT: 97 LBS | SYSTOLIC BLOOD PRESSURE: 110 MMHG

## 2023-05-30 DIAGNOSIS — E78.2 MIXED HYPERLIPIDEMIA: ICD-10-CM

## 2023-05-30 DIAGNOSIS — J44.9 CHRONIC OBSTRUCTIVE PULMONARY DISEASE, UNSPECIFIED COPD TYPE (HCC): ICD-10-CM

## 2023-05-30 DIAGNOSIS — I10 PRIMARY HYPERTENSION: ICD-10-CM

## 2023-05-30 DIAGNOSIS — I10 PRIMARY HYPERTENSION: Primary | ICD-10-CM

## 2023-05-30 DIAGNOSIS — G89.29 CHRONIC BILATERAL THORACIC BACK PAIN: ICD-10-CM

## 2023-05-30 DIAGNOSIS — M54.6 CHRONIC BILATERAL THORACIC BACK PAIN: ICD-10-CM

## 2023-05-30 DIAGNOSIS — Z00.00 MEDICARE ANNUAL WELLNESS VISIT, SUBSEQUENT: Primary | ICD-10-CM

## 2023-05-30 DIAGNOSIS — I25.10 CAD IN NATIVE ARTERY: ICD-10-CM

## 2023-05-30 LAB
ALBUMIN SERPL-MCNC: 4.1 G/DL (ref 3.5–5.2)
ALP SERPL-CCNC: 138 U/L (ref 35–104)
ALT SERPL-CCNC: 25 U/L (ref 0–32)
ANION GAP SERPL CALCULATED.3IONS-SCNC: 13 MMOL/L (ref 7–16)
AST SERPL-CCNC: 33 U/L (ref 0–31)
BASOPHILS # BLD: 0.09 E9/L (ref 0–0.2)
BASOPHILS NFR BLD: 1.3 % (ref 0–2)
BILIRUB SERPL-MCNC: 0.3 MG/DL (ref 0–1.2)
BUN SERPL-MCNC: 16 MG/DL (ref 6–23)
CALCIUM SERPL-MCNC: 9.6 MG/DL (ref 8.6–10.2)
CHLORIDE SERPL-SCNC: 101 MMOL/L (ref 98–107)
CHOLESTEROL, TOTAL: 147 MG/DL (ref 0–199)
CO2 SERPL-SCNC: 24 MMOL/L (ref 22–29)
CREAT SERPL-MCNC: 0.9 MG/DL (ref 0.5–1)
EOSINOPHIL # BLD: 0.09 E9/L (ref 0.05–0.5)
EOSINOPHIL NFR BLD: 1.3 % (ref 0–6)
ERYTHROCYTE [DISTWIDTH] IN BLOOD BY AUTOMATED COUNT: 14.7 FL (ref 11.5–15)
GLUCOSE SERPL-MCNC: 130 MG/DL (ref 74–99)
HCT VFR BLD AUTO: 43.7 % (ref 34–48)
HDLC SERPL-MCNC: 54 MG/DL
HGB BLD-MCNC: 13.2 G/DL (ref 11.5–15.5)
IMM GRANULOCYTES # BLD: 0.02 E9/L
IMM GRANULOCYTES NFR BLD: 0.3 % (ref 0–5)
LDLC SERPL CALC-MCNC: 71 MG/DL (ref 0–99)
LYMPHOCYTES # BLD: 1.76 E9/L (ref 1.5–4)
LYMPHOCYTES NFR BLD: 25.3 % (ref 20–42)
MCH RBC QN AUTO: 28.9 PG (ref 26–35)
MCHC RBC AUTO-ENTMCNC: 30.2 % (ref 32–34.5)
MCV RBC AUTO: 95.8 FL (ref 80–99.9)
MONOCYTES # BLD: 0.89 E9/L (ref 0.1–0.95)
MONOCYTES NFR BLD: 12.8 % (ref 2–12)
NEUTROPHILS # BLD: 4.1 E9/L (ref 1.8–7.3)
NEUTS SEG NFR BLD: 59 % (ref 43–80)
PLATELET # BLD AUTO: 233 E9/L (ref 130–450)
PMV BLD AUTO: 10.9 FL (ref 7–12)
POTASSIUM SERPL-SCNC: 4.2 MMOL/L (ref 3.5–5)
PROT SERPL-MCNC: 7.3 G/DL (ref 6.4–8.3)
RBC # BLD AUTO: 4.56 E12/L (ref 3.5–5.5)
SODIUM SERPL-SCNC: 138 MMOL/L (ref 132–146)
TRIGL SERPL-MCNC: 111 MG/DL (ref 0–149)
TSH SERPL-MCNC: 2.02 UIU/ML (ref 0.27–4.2)
VLDLC SERPL CALC-MCNC: 22 MG/DL
WBC # BLD: 7 E9/L (ref 4.5–11.5)

## 2023-05-30 PROCEDURE — 1036F TOBACCO NON-USER: CPT | Performed by: NURSE PRACTITIONER

## 2023-05-30 PROCEDURE — 3078F DIAST BP <80 MM HG: CPT | Performed by: NURSE PRACTITIONER

## 2023-05-30 PROCEDURE — G0439 PPPS, SUBSEQ VISIT: HCPCS | Performed by: NURSE PRACTITIONER

## 2023-05-30 PROCEDURE — G8400 PT W/DXA NO RESULTS DOC: HCPCS | Performed by: NURSE PRACTITIONER

## 2023-05-30 PROCEDURE — 1090F PRES/ABSN URINE INCON ASSESS: CPT | Performed by: NURSE PRACTITIONER

## 2023-05-30 PROCEDURE — 99214 OFFICE O/P EST MOD 30 MIN: CPT | Performed by: NURSE PRACTITIONER

## 2023-05-30 PROCEDURE — 3023F SPIROM DOC REV: CPT | Performed by: NURSE PRACTITIONER

## 2023-05-30 PROCEDURE — 1123F ACP DISCUSS/DSCN MKR DOCD: CPT | Performed by: NURSE PRACTITIONER

## 2023-05-30 PROCEDURE — 3017F COLORECTAL CA SCREEN DOC REV: CPT | Performed by: NURSE PRACTITIONER

## 2023-05-30 PROCEDURE — G8419 CALC BMI OUT NRM PARAM NOF/U: HCPCS | Performed by: NURSE PRACTITIONER

## 2023-05-30 PROCEDURE — G8427 DOCREV CUR MEDS BY ELIG CLIN: HCPCS | Performed by: NURSE PRACTITIONER

## 2023-05-30 PROCEDURE — 3074F SYST BP LT 130 MM HG: CPT | Performed by: NURSE PRACTITIONER

## 2023-05-30 ASSESSMENT — ENCOUNTER SYMPTOMS: GASTROINTESTINAL NEGATIVE: 1

## 2023-05-30 ASSESSMENT — PATIENT HEALTH QUESTIONNAIRE - PHQ9
1. LITTLE INTEREST OR PLEASURE IN DOING THINGS: 0
SUM OF ALL RESPONSES TO PHQ QUESTIONS 1-9: 0
2. FEELING DOWN, DEPRESSED OR HOPELESS: 0
SUM OF ALL RESPONSES TO PHQ QUESTIONS 1-9: 0
SUM OF ALL RESPONSES TO PHQ QUESTIONS 1-9: 0
SUM OF ALL RESPONSES TO PHQ9 QUESTIONS 1 & 2: 0
SUM OF ALL RESPONSES TO PHQ QUESTIONS 1-9: 0

## 2023-05-30 ASSESSMENT — LIFESTYLE VARIABLES
HOW OFTEN DO YOU HAVE A DRINK CONTAINING ALCOHOL: NEVER
HOW MANY STANDARD DRINKS CONTAINING ALCOHOL DO YOU HAVE ON A TYPICAL DAY: PATIENT DOES NOT DRINK

## 2023-05-30 NOTE — PROGRESS NOTES
Subjective  CC: Patient presents to follow-up. HPI: The patient followed with cardiology in February. She did have complete healing of the left wrist after the fracture. Unfortunately, the patient states on New Year's, she got up in the middle the night to check on something and fell down 10 sets of stairs. Patient states that she punctured a hole through the drywall with her head. She showed me pictures, she did have ecchymotic raccoon eyes. She was not seen by anyone. It has now been almost 6 months since the accident. She reports no further falls at home. She states she thinks that that she fell because she thought she stepped on a cat and lost her balance. She states she has recovered completely except for some pain that she continues to have any mid thoracic region. This is with prolonged activity. She denies any radicular symptoms. She did not yet get mammogram, bone density scan, or thyroid ultrasound completed. She does report history of COPD but respiratory status has been stable without any inhalers chronically. Weight has been stable. ROS:  Review of Systems   Constitutional:         Weight loss of about 15-20lbs with CABG, slowly returning to baseline   HENT: Negative. Eyes:         Wears glasses, dry eyes   Respiratory:          ? COPD   Cardiovascular:         CABGx4   Gastrointestinal: Negative. Genitourinary: Negative. Musculoskeletal:         Bilateral hip pain back pain as above   Skin: Negative. Neurological: Negative.     Psychiatric/Behavioral:          Insomnia        Current Outpatient Medications:     famotidine (PEPCID) 20 MG tablet, Take 1 tablet by mouth 2 times daily, Disp: 60 tablet, Rfl: 3    metoprolol tartrate (LOPRESSOR) 25 MG tablet, Take 1 tablet by mouth 2 times daily, Disp: 60 tablet, Rfl: 3    atorvastatin (LIPITOR) 80 MG tablet, Take 1 tablet by mouth daily, Disp: 30 tablet, Rfl: 3    Flaxseed, Linseed, (FLAXSEED OIL PO), Take by mouth, Disp: ,

## 2023-05-30 NOTE — PROGRESS NOTES
Medicare Annual Wellness Visit    Maria Esther Patterson is here for Medicare AWV    Assessment & Plan   Medicare annual wellness visit, subsequent    Recommendations for Preventive Services Due: see orders and patient instructions/AVS.  Recommended screening schedule for the next 5-10 years is provided to the patient in written form: see Patient Instructions/AVS.     Return for Medicare Annual Wellness Visit in 1 year. Subjective       Patient's complete Health Risk Assessment and screening values have been reviewed and are found in Flowsheets. The following problems were reviewed today and where indicated follow up appointments were made and/or referrals ordered. Positive Risk Factor Screenings with Interventions:    Fall Risk:  Do you feel unsteady or are you worried about falling? : no  2 or more falls in past year?: (!) yes  Fall with injury in past year?: (!) yes     Interventions:    See office visit note              Weight and Activity:  Physical Activity: Insufficiently Active    Days of Exercise per Week: 3 days    Minutes of Exercise per Session: 40 min     On average, how many days per week do you engage in moderate to strenuous exercise (like a brisk walk)?: 3 days  Have you lost any weight without trying in the past 3 months?: (!) Yes  Body mass index is 17.74 kg/m². (!) Abnormal    Unintentional Weight Loss Interventions:  stable  Underweight Interventions:  stable         Safety:  Do you have working smoke detectors?: (!) No  Do all of your stairways have a railing or banister?: (!) No  Interventions:  See above                     Objective   Vitals:    05/30/23 1003   BP: 110/62   Pulse: 97   Temp: 98.4 °F (36.9 °C)   SpO2: 97%   Weight: 97 lb (44 kg)   Height: 5' 2\" (1.575 m)      Body mass index is 17.74 kg/m². Allergies   Allergen Reactions    Latex Itching     Swelling, itching, and redness    Tramadol Nausea Only     Prior to Visit Medications    Medication Sig Taking?

## 2023-05-31 DIAGNOSIS — R73.03 BORDERLINE DIABETES MELLITUS: Primary | ICD-10-CM

## 2023-05-31 DIAGNOSIS — R73.03 BORDERLINE DIABETES MELLITUS: ICD-10-CM

## 2023-05-31 LAB — HBA1C MFR BLD: 6.3 % (ref 4–5.6)

## 2023-06-26 DIAGNOSIS — I25.10 CAD IN NATIVE ARTERY: ICD-10-CM

## 2023-06-26 DIAGNOSIS — E78.2 MIXED HYPERLIPIDEMIA: ICD-10-CM

## 2023-06-27 RX ORDER — ATORVASTATIN CALCIUM 80 MG/1
80 TABLET, FILM COATED ORAL DAILY
Qty: 30 TABLET | Refills: 3 | Status: SHIPPED | OUTPATIENT
Start: 2023-06-27

## 2023-06-27 RX ORDER — FAMOTIDINE 20 MG/1
20 TABLET, FILM COATED ORAL 2 TIMES DAILY
Qty: 60 TABLET | Refills: 3 | Status: SHIPPED | OUTPATIENT
Start: 2023-06-27

## 2023-10-16 DIAGNOSIS — I25.10 CAD IN NATIVE ARTERY: ICD-10-CM

## 2023-10-16 DIAGNOSIS — E78.2 MIXED HYPERLIPIDEMIA: ICD-10-CM

## 2023-10-17 RX ORDER — FAMOTIDINE 20 MG/1
20 TABLET, FILM COATED ORAL 2 TIMES DAILY
Qty: 60 TABLET | Refills: 3 | Status: SHIPPED | OUTPATIENT
Start: 2023-10-17

## 2023-10-17 RX ORDER — ATORVASTATIN CALCIUM 80 MG/1
80 TABLET, FILM COATED ORAL DAILY
Qty: 30 TABLET | Refills: 3 | Status: SHIPPED | OUTPATIENT
Start: 2023-10-17

## 2023-10-17 RX ORDER — ALBUTEROL SULFATE 90 UG/1
2 AEROSOL, METERED RESPIRATORY (INHALATION) 4 TIMES DAILY PRN
Qty: 18 G | Refills: 5 | Status: SHIPPED | OUTPATIENT
Start: 2023-10-17

## 2023-10-17 NOTE — TELEPHONE ENCOUNTER
Last Appointment:  5/30/2023  Future Appointments   Date Time Provider [address]   12/7/2023 10:30 AM Marko Bryant, Yesica Proctor

## 2023-11-30 ENCOUNTER — TELEPHONE (OUTPATIENT)
Dept: PRIMARY CARE CLINIC | Age: 73
End: 2023-11-30

## 2023-12-07 ENCOUNTER — OFFICE VISIT (OUTPATIENT)
Dept: PRIMARY CARE CLINIC | Age: 73
End: 2023-12-07
Payer: MEDICARE

## 2023-12-07 VITALS
OXYGEN SATURATION: 96 % | WEIGHT: 96.8 LBS | HEIGHT: 62 IN | HEART RATE: 80 BPM | DIASTOLIC BLOOD PRESSURE: 82 MMHG | BODY MASS INDEX: 17.81 KG/M2 | TEMPERATURE: 97.6 F | SYSTOLIC BLOOD PRESSURE: 130 MMHG

## 2023-12-07 DIAGNOSIS — Z12.31 BREAST CANCER SCREENING BY MAMMOGRAM: ICD-10-CM

## 2023-12-07 DIAGNOSIS — R73.09 ELEVATED GLUCOSE: ICD-10-CM

## 2023-12-07 DIAGNOSIS — S22.008A OTHER CLOSED FRACTURE OF THORACIC VERTEBRA, UNSPECIFIED THORACIC VERTEBRAL LEVEL, INITIAL ENCOUNTER (HCC): ICD-10-CM

## 2023-12-07 DIAGNOSIS — Z78.0 POSTMENOPAUSAL: ICD-10-CM

## 2023-12-07 DIAGNOSIS — E78.2 MIXED HYPERLIPIDEMIA: ICD-10-CM

## 2023-12-07 DIAGNOSIS — E04.9 ENLARGED THYROID: ICD-10-CM

## 2023-12-07 DIAGNOSIS — I10 PRIMARY HYPERTENSION: ICD-10-CM

## 2023-12-07 DIAGNOSIS — E78.2 MIXED HYPERLIPIDEMIA: Primary | ICD-10-CM

## 2023-12-07 DIAGNOSIS — I25.10 CAD IN NATIVE ARTERY: ICD-10-CM

## 2023-12-07 LAB
ALBUMIN SERPL-MCNC: 4.2 G/DL (ref 3.5–5.2)
ALP BLD-CCNC: 125 U/L (ref 35–104)
ALT SERPL-CCNC: 24 U/L (ref 0–32)
ANION GAP SERPL CALCULATED.3IONS-SCNC: 14 MMOL/L (ref 7–16)
AST SERPL-CCNC: 31 U/L (ref 0–31)
BILIRUB SERPL-MCNC: 0.4 MG/DL (ref 0–1.2)
BUN BLDV-MCNC: 14 MG/DL (ref 6–23)
CALCIUM SERPL-MCNC: 9.3 MG/DL (ref 8.6–10.2)
CHLORIDE BLD-SCNC: 102 MMOL/L (ref 98–107)
CHOLESTEROL: 142 MG/DL
CO2: 22 MMOL/L (ref 22–29)
CREAT SERPL-MCNC: 1 MG/DL (ref 0.5–1)
GFR SERPL CREATININE-BSD FRML MDRD: >60 ML/MIN/1.73M2
GLUCOSE BLD-MCNC: 107 MG/DL (ref 74–99)
HBA1C MFR BLD: 6.2 % (ref 4–5.6)
HDLC SERPL-MCNC: 50 MG/DL
LDL CHOLESTEROL: 79 MG/DL
POTASSIUM SERPL-SCNC: 4.7 MMOL/L (ref 3.5–5)
SODIUM BLD-SCNC: 138 MMOL/L (ref 132–146)
TOTAL PROTEIN: 7 G/DL (ref 6.4–8.3)
TRIGL SERPL-MCNC: 66 MG/DL
VLDLC SERPL CALC-MCNC: 13 MG/DL

## 2023-12-07 PROCEDURE — G8427 DOCREV CUR MEDS BY ELIG CLIN: HCPCS | Performed by: NURSE PRACTITIONER

## 2023-12-07 PROCEDURE — 99214 OFFICE O/P EST MOD 30 MIN: CPT | Performed by: NURSE PRACTITIONER

## 2023-12-07 PROCEDURE — G8484 FLU IMMUNIZE NO ADMIN: HCPCS | Performed by: NURSE PRACTITIONER

## 2023-12-07 PROCEDURE — G8400 PT W/DXA NO RESULTS DOC: HCPCS | Performed by: NURSE PRACTITIONER

## 2023-12-07 PROCEDURE — 1090F PRES/ABSN URINE INCON ASSESS: CPT | Performed by: NURSE PRACTITIONER

## 2023-12-07 PROCEDURE — 1036F TOBACCO NON-USER: CPT | Performed by: NURSE PRACTITIONER

## 2023-12-07 PROCEDURE — 3017F COLORECTAL CA SCREEN DOC REV: CPT | Performed by: NURSE PRACTITIONER

## 2023-12-07 PROCEDURE — G8419 CALC BMI OUT NRM PARAM NOF/U: HCPCS | Performed by: NURSE PRACTITIONER

## 2023-12-07 PROCEDURE — 3079F DIAST BP 80-89 MM HG: CPT | Performed by: NURSE PRACTITIONER

## 2023-12-07 PROCEDURE — 3075F SYST BP GE 130 - 139MM HG: CPT | Performed by: NURSE PRACTITIONER

## 2023-12-07 PROCEDURE — 1123F ACP DISCUSS/DSCN MKR DOCD: CPT | Performed by: NURSE PRACTITIONER

## 2023-12-07 ASSESSMENT — ENCOUNTER SYMPTOMS: GASTROINTESTINAL NEGATIVE: 1

## 2023-12-07 NOTE — PROGRESS NOTES
is articulate and fluent. Subha Grayson was seen today for hypertension. Diagnoses and all orders for this visit:    Mixed hyperlipidemia  -     Comprehensive Metabolic Panel; Future  -     Lipid Panel; Future    CAD in native artery    Primary hypertension    Enlarged thyroid  -     US THYROID; Future    Elevated glucose  -     Comprehensive Metabolic Panel; Future  -     Hemoglobin A1C; Future    Breast cancer screening by mammogram  -     [facility] DIGITAL SCREEN W OR WO CAD BILATERAL; Future    Postmenopausal  -     DEXA BONE DENSITY 2 SITES; Future    Other closed fracture of thoracic vertebra, unspecified thoracic vertebral level, initial encounter [facility])  -     External Referral To Orthopedic Surgery       Care Plan:  Patient will have lab work completed today. Imaging studies will be ordered as above. She is to continue to follow with cardiology, referral for Dr. Shelbi Ventura will be made. I will plan to see her back in 6 months for regular office visit and an annual wellness visit.

## 2024-02-07 DIAGNOSIS — E78.2 MIXED HYPERLIPIDEMIA: ICD-10-CM

## 2024-02-07 DIAGNOSIS — I25.10 CAD IN NATIVE ARTERY: ICD-10-CM

## 2024-02-07 RX ORDER — FAMOTIDINE 20 MG/1
20 TABLET, FILM COATED ORAL 2 TIMES DAILY
Qty: 60 TABLET | Refills: 3 | Status: SHIPPED | OUTPATIENT
Start: 2024-02-07

## 2024-02-07 RX ORDER — ATORVASTATIN CALCIUM 80 MG/1
80 TABLET, FILM COATED ORAL DAILY
Qty: 30 TABLET | Refills: 3 | Status: SHIPPED | OUTPATIENT
Start: 2024-02-07

## 2024-06-02 SDOH — ECONOMIC STABILITY: FOOD INSECURITY: WITHIN THE PAST 12 MONTHS, THE FOOD YOU BOUGHT JUST DIDN'T LAST AND YOU DIDN'T HAVE MONEY TO GET MORE.: PATIENT DECLINED

## 2024-06-02 SDOH — ECONOMIC STABILITY: HOUSING INSECURITY
IN THE LAST 12 MONTHS, WAS THERE A TIME WHEN YOU DID NOT HAVE A STEADY PLACE TO SLEEP OR SLEPT IN A SHELTER (INCLUDING NOW)?: PATIENT DECLINED

## 2024-06-02 SDOH — ECONOMIC STABILITY: INCOME INSECURITY: HOW HARD IS IT FOR YOU TO PAY FOR THE VERY BASICS LIKE FOOD, HOUSING, MEDICAL CARE, AND HEATING?: PATIENT DECLINED

## 2024-06-02 SDOH — ECONOMIC STABILITY: FOOD INSECURITY: WITHIN THE PAST 12 MONTHS, YOU WORRIED THAT YOUR FOOD WOULD RUN OUT BEFORE YOU GOT MONEY TO BUY MORE.: PATIENT DECLINED

## 2024-06-02 ASSESSMENT — PATIENT HEALTH QUESTIONNAIRE - PHQ9
6. FEELING BAD ABOUT YOURSELF - OR THAT YOU ARE A FAILURE OR HAVE LET YOURSELF OR YOUR FAMILY DOWN: MORE THAN HALF THE DAYS
3. TROUBLE FALLING OR STAYING ASLEEP: SEVERAL DAYS
8. MOVING OR SPEAKING SO SLOWLY THAT OTHER PEOPLE COULD HAVE NOTICED. OR THE OPPOSITE, BEING SO FIGETY OR RESTLESS THAT YOU HAVE BEEN MOVING AROUND A LOT MORE THAN USUAL: NOT AT ALL
7. TROUBLE CONCENTRATING ON THINGS, SUCH AS READING THE NEWSPAPER OR WATCHING TELEVISION: SEVERAL DAYS
10. IF YOU CHECKED OFF ANY PROBLEMS, HOW DIFFICULT HAVE THESE PROBLEMS MADE IT FOR YOU TO DO YOUR WORK, TAKE CARE OF THINGS AT HOME, OR GET ALONG WITH OTHER PEOPLE: SOMEWHAT DIFFICULT
7. TROUBLE CONCENTRATING ON THINGS, SUCH AS READING THE NEWSPAPER OR WATCHING TELEVISION: SEVERAL DAYS
4. FEELING TIRED OR HAVING LITTLE ENERGY: MORE THAN HALF THE DAYS
SUM OF ALL RESPONSES TO PHQ QUESTIONS 1-9: 12
4. FEELING TIRED OR HAVING LITTLE ENERGY: MORE THAN HALF THE DAYS
SUM OF ALL RESPONSES TO PHQ QUESTIONS 1-9: 12
SUM OF ALL RESPONSES TO PHQ QUESTIONS 1-9: 12
3. TROUBLE FALLING OR STAYING ASLEEP: SEVERAL DAYS
9. THOUGHTS THAT YOU WOULD BE BETTER OFF DEAD, OR OF HURTING YOURSELF: NOT AT ALL
6. FEELING BAD ABOUT YOURSELF - OR THAT YOU ARE A FAILURE OR HAVE LET YOURSELF OR YOUR FAMILY DOWN: MORE THAN HALF THE DAYS
SUM OF ALL RESPONSES TO PHQ QUESTIONS 1-9: 12
SUM OF ALL RESPONSES TO PHQ9 QUESTIONS 1 & 2: 4
1. LITTLE INTEREST OR PLEASURE IN DOING THINGS: MORE THAN HALF THE DAYS
5. POOR APPETITE OR OVEREATING: MORE THAN HALF THE DAYS
10. IF YOU CHECKED OFF ANY PROBLEMS, HOW DIFFICULT HAVE THESE PROBLEMS MADE IT FOR YOU TO DO YOUR WORK, TAKE CARE OF THINGS AT HOME, OR GET ALONG WITH OTHER PEOPLE: SOMEWHAT DIFFICULT
8. MOVING OR SPEAKING SO SLOWLY THAT OTHER PEOPLE COULD HAVE NOTICED. OR THE OPPOSITE - BEING SO FIDGETY OR RESTLESS THAT YOU HAVE BEEN MOVING AROUND A LOT MORE THAN USUAL: NOT AT ALL
1. LITTLE INTEREST OR PLEASURE IN DOING THINGS: MORE THAN HALF THE DAYS
5. POOR APPETITE OR OVEREATING: MORE THAN HALF THE DAYS
2. FEELING DOWN, DEPRESSED OR HOPELESS: MORE THAN HALF THE DAYS
SUM OF ALL RESPONSES TO PHQ9 QUESTIONS 1 & 2: 4
2. FEELING DOWN, DEPRESSED OR HOPELESS: MORE THAN HALF THE DAYS
SUM OF ALL RESPONSES TO PHQ QUESTIONS 1-9: 12
9. THOUGHTS THAT YOU WOULD BE BETTER OFF DEAD, OR OF HURTING YOURSELF: NOT AT ALL

## 2024-06-04 ENCOUNTER — OFFICE VISIT (OUTPATIENT)
Dept: PRIMARY CARE CLINIC | Age: 74
End: 2024-06-04
Payer: MEDICARE

## 2024-06-04 VITALS
TEMPERATURE: 98.5 F | HEIGHT: 62 IN | TEMPERATURE: 98.5 F | WEIGHT: 90 LBS | DIASTOLIC BLOOD PRESSURE: 64 MMHG | SYSTOLIC BLOOD PRESSURE: 118 MMHG | HEART RATE: 76 BPM | HEIGHT: 62 IN | BODY MASS INDEX: 16.56 KG/M2 | HEART RATE: 76 BPM | WEIGHT: 90 LBS | OXYGEN SATURATION: 94 % | BODY MASS INDEX: 16.56 KG/M2 | SYSTOLIC BLOOD PRESSURE: 118 MMHG | OXYGEN SATURATION: 94 % | DIASTOLIC BLOOD PRESSURE: 64 MMHG

## 2024-06-04 DIAGNOSIS — E78.2 MIXED HYPERLIPIDEMIA: ICD-10-CM

## 2024-06-04 DIAGNOSIS — M81.0 AGE-RELATED OSTEOPOROSIS WITHOUT CURRENT PATHOLOGICAL FRACTURE: ICD-10-CM

## 2024-06-04 DIAGNOSIS — I10 PRIMARY HYPERTENSION: ICD-10-CM

## 2024-06-04 DIAGNOSIS — R73.09 ELEVATED GLUCOSE: ICD-10-CM

## 2024-06-04 DIAGNOSIS — J44.9 CHRONIC OBSTRUCTIVE PULMONARY DISEASE, UNSPECIFIED COPD TYPE (HCC): ICD-10-CM

## 2024-06-04 DIAGNOSIS — I25.10 CAD IN NATIVE ARTERY: ICD-10-CM

## 2024-06-04 DIAGNOSIS — Z00.00 MEDICARE ANNUAL WELLNESS VISIT, SUBSEQUENT: Primary | ICD-10-CM

## 2024-06-04 DIAGNOSIS — E78.2 MIXED HYPERLIPIDEMIA: Primary | ICD-10-CM

## 2024-06-04 LAB
ALBUMIN: 4.5 G/DL (ref 3.5–5.2)
ALP BLD-CCNC: 124 U/L (ref 35–104)
ALT SERPL-CCNC: 26 U/L (ref 0–32)
ANION GAP SERPL CALCULATED.3IONS-SCNC: 14 MMOL/L (ref 7–16)
AST SERPL-CCNC: 34 U/L (ref 0–31)
BASOPHILS ABSOLUTE: 0.09 K/UL (ref 0–0.2)
BILIRUB SERPL-MCNC: 0.4 MG/DL (ref 0–1.2)
BUN BLDV-MCNC: 15 MG/DL (ref 6–23)
CALCIUM SERPL-MCNC: 9.6 MG/DL (ref 8.6–10.2)
CHLORIDE BLD-SCNC: 102 MMOL/L (ref 98–107)
CHOLESTEROL, TOTAL: 162 MG/DL
CO2: 25 MMOL/L (ref 22–29)
CREAT SERPL-MCNC: 1 MG/DL (ref 0.5–1)
EOSINOPHILS ABSOLUTE: 0.07 K/UL (ref 0.05–0.5)
EOSINOPHILS RELATIVE PERCENT: 1 % (ref 0–6)
GFR, ESTIMATED: 57 ML/MIN/1.73M2
GLUCOSE BLD-MCNC: 113 MG/DL (ref 74–99)
HBA1C MFR BLD: 6.5 % (ref 4–5.6)
HCT VFR BLD CALC: 45 % (ref 34–48)
HDLC SERPL-MCNC: 62 MG/DL
HEMOGLOBIN: 14 G/DL (ref 11.5–15.5)
IMMATURE GRANULOCYTES %: 1 % (ref 0–5)
IMMATURE GRANULOCYTES ABSOLUTE: 0.05 K/UL (ref 0–0.58)
LDL CHOLESTEROL: 88 MG/DL
LYMPHOCYTES ABSOLUTE: 1.94 K/UL (ref 1.5–4)
LYMPHOCYTES RELATIVE PERCENT: 20 % (ref 20–42)
MCH RBC QN AUTO: 29.1 PG (ref 26–35)
MCHC RBC AUTO-ENTMCNC: 31.1 G/DL (ref 32–34.5)
MCV RBC AUTO: 93.6 FL (ref 80–99.9)
MONOCYTES ABSOLUTE: 0.89 K/UL (ref 0.1–0.95)
MONOCYTES RELATIVE PERCENT: 9 % (ref 2–12)
NEUTROPHILS ABSOLUTE: 6.57 K/UL (ref 1.8–7.3)
NEUTROPHILS RELATIVE PERCENT: 68 % (ref 43–80)
PDW BLD-RTO: 14.5 % (ref 11.5–15)
PLATELET # BLD: 220 K/UL (ref 130–450)
PMV BLD AUTO: 11.4 FL (ref 7–12)
POTASSIUM SERPL-SCNC: 4.5 MMOL/L (ref 3.5–5)
RBC # BLD: 4.81 M/UL (ref 3.5–5.5)
SODIUM BLD-SCNC: 141 MMOL/L (ref 132–146)
TOTAL PROTEIN: 7.7 G/DL (ref 6.4–8.3)
TRIGL SERPL-MCNC: 59 MG/DL
VLDLC SERPL CALC-MCNC: 12 MG/DL
WBC # BLD: 9.6 K/UL (ref 4.5–11.5)

## 2024-06-04 PROCEDURE — G8419 CALC BMI OUT NRM PARAM NOF/U: HCPCS | Performed by: NURSE PRACTITIONER

## 2024-06-04 PROCEDURE — 3023F SPIROM DOC REV: CPT | Performed by: NURSE PRACTITIONER

## 2024-06-04 PROCEDURE — G0439 PPPS, SUBSEQ VISIT: HCPCS | Performed by: NURSE PRACTITIONER

## 2024-06-04 PROCEDURE — 1090F PRES/ABSN URINE INCON ASSESS: CPT | Performed by: NURSE PRACTITIONER

## 2024-06-04 PROCEDURE — G8399 PT W/DXA RESULTS DOCUMENT: HCPCS | Performed by: NURSE PRACTITIONER

## 2024-06-04 PROCEDURE — 3078F DIAST BP <80 MM HG: CPT | Performed by: NURSE PRACTITIONER

## 2024-06-04 PROCEDURE — G8427 DOCREV CUR MEDS BY ELIG CLIN: HCPCS | Performed by: NURSE PRACTITIONER

## 2024-06-04 PROCEDURE — 1036F TOBACCO NON-USER: CPT | Performed by: NURSE PRACTITIONER

## 2024-06-04 PROCEDURE — 99214 OFFICE O/P EST MOD 30 MIN: CPT | Performed by: NURSE PRACTITIONER

## 2024-06-04 PROCEDURE — 3017F COLORECTAL CA SCREEN DOC REV: CPT | Performed by: NURSE PRACTITIONER

## 2024-06-04 PROCEDURE — 1123F ACP DISCUSS/DSCN MKR DOCD: CPT | Performed by: NURSE PRACTITIONER

## 2024-06-04 PROCEDURE — 3074F SYST BP LT 130 MM HG: CPT | Performed by: NURSE PRACTITIONER

## 2024-06-04 SDOH — HEALTH STABILITY: PHYSICAL HEALTH: ON AVERAGE, HOW MANY MINUTES DO YOU ENGAGE IN EXERCISE AT THIS LEVEL?: 60 MIN

## 2024-06-04 SDOH — HEALTH STABILITY: PHYSICAL HEALTH: ON AVERAGE, HOW MANY DAYS PER WEEK DO YOU ENGAGE IN MODERATE TO STRENUOUS EXERCISE (LIKE A BRISK WALK)?: 3 DAYS

## 2024-06-04 ASSESSMENT — PATIENT HEALTH QUESTIONNAIRE - PHQ9
SUM OF ALL RESPONSES TO PHQ QUESTIONS 1-9: 4
SUM OF ALL RESPONSES TO PHQ QUESTIONS 1-9: 4
3. TROUBLE FALLING OR STAYING ASLEEP: NOT AT ALL
SUM OF ALL RESPONSES TO PHQ QUESTIONS 1-9: 4
SUM OF ALL RESPONSES TO PHQ QUESTIONS 1-9: 10
6. FEELING BAD ABOUT YOURSELF - OR THAT YOU ARE A FAILURE OR HAVE LET YOURSELF OR YOUR FAMILY DOWN: NOT AT ALL
9. THOUGHTS THAT YOU WOULD BE BETTER OFF DEAD, OR OF HURTING YOURSELF: NOT AT ALL
7. TROUBLE CONCENTRATING ON THINGS, SUCH AS READING THE NEWSPAPER OR WATCHING TELEVISION: NOT AT ALL
10. IF YOU CHECKED OFF ANY PROBLEMS, HOW DIFFICULT HAVE THESE PROBLEMS MADE IT FOR YOU TO DO YOUR WORK, TAKE CARE OF THINGS AT HOME, OR GET ALONG WITH OTHER PEOPLE: EXTREMELY DIFFICULT
SUM OF ALL RESPONSES TO PHQ9 QUESTIONS 1 & 2: 4
2. FEELING DOWN, DEPRESSED OR HOPELESS: MORE THAN HALF THE DAYS
1. LITTLE INTEREST OR PLEASURE IN DOING THINGS: MORE THAN HALF THE DAYS
5. POOR APPETITE OR OVEREATING: NEARLY EVERY DAY
2. FEELING DOWN, DEPRESSED OR HOPELESS: MORE THAN HALF THE DAYS
SUM OF ALL RESPONSES TO PHQ QUESTIONS 1-9: 4
SUM OF ALL RESPONSES TO PHQ QUESTIONS 1-9: 10
SUM OF ALL RESPONSES TO PHQ QUESTIONS 1-9: 10
1. LITTLE INTEREST OR PLEASURE IN DOING THINGS: MORE THAN HALF THE DAYS
SUM OF ALL RESPONSES TO PHQ9 QUESTIONS 1 & 2: 4
SUM OF ALL RESPONSES TO PHQ QUESTIONS 1-9: 10
4. FEELING TIRED OR HAVING LITTLE ENERGY: NEARLY EVERY DAY
8. MOVING OR SPEAKING SO SLOWLY THAT OTHER PEOPLE COULD HAVE NOTICED. OR THE OPPOSITE, BEING SO FIGETY OR RESTLESS THAT YOU HAVE BEEN MOVING AROUND A LOT MORE THAN USUAL: NOT AT ALL

## 2024-06-04 ASSESSMENT — LIFESTYLE VARIABLES
HOW MANY STANDARD DRINKS CONTAINING ALCOHOL DO YOU HAVE ON A TYPICAL DAY: PATIENT DOES NOT DRINK
HOW MANY STANDARD DRINKS CONTAINING ALCOHOL DO YOU HAVE ON A TYPICAL DAY: PATIENT DOES NOT DRINK
HOW MANY STANDARD DRINKS CONTAINING ALCOHOL DO YOU HAVE ON A TYPICAL DAY: 0
HOW OFTEN DO YOU HAVE A DRINK CONTAINING ALCOHOL: 1
HOW OFTEN DO YOU HAVE A DRINK CONTAINING ALCOHOL: NEVER
HOW OFTEN DO YOU HAVE A DRINK CONTAINING ALCOHOL: NEVER
HOW OFTEN DO YOU HAVE SIX OR MORE DRINKS ON ONE OCCASION: 1

## 2024-06-04 ASSESSMENT — ENCOUNTER SYMPTOMS: GASTROINTESTINAL NEGATIVE: 1

## 2024-06-04 ASSESSMENT — ANXIETY QUESTIONNAIRES
1. FEELING NERVOUS, ANXIOUS, OR ON EDGE: SEVERAL DAYS
6. BECOMING EASILY ANNOYED OR IRRITABLE: SEVERAL DAYS
4. TROUBLE RELAXING: SEVERAL DAYS
3. WORRYING TOO MUCH ABOUT DIFFERENT THINGS: SEVERAL DAYS
6. BECOMING EASILY ANNOYED OR IRRITABLE: SEVERAL DAYS
IF YOU CHECKED OFF ANY PROBLEMS ON THIS QUESTIONNAIRE, HOW DIFFICULT HAVE THESE PROBLEMS MADE IT FOR YOU TO DO YOUR WORK, TAKE CARE OF THINGS AT HOME, OR GET ALONG WITH OTHER PEOPLE: SOMEWHAT DIFFICULT
4. TROUBLE RELAXING: SEVERAL DAYS
5. BEING SO RESTLESS THAT IT IS HARD TO SIT STILL: SEVERAL DAYS
5. BEING SO RESTLESS THAT IT IS HARD TO SIT STILL: SEVERAL DAYS
2. NOT BEING ABLE TO STOP OR CONTROL WORRYING: SEVERAL DAYS
IF YOU CHECKED OFF ANY PROBLEMS ON THIS QUESTIONNAIRE, HOW DIFFICULT HAVE THESE PROBLEMS MADE IT FOR YOU TO DO YOUR WORK, TAKE CARE OF THINGS AT HOME, OR GET ALONG WITH OTHER PEOPLE: SOMEWHAT DIFFICULT
2. NOT BEING ABLE TO STOP OR CONTROL WORRYING: SEVERAL DAYS
GAD7 TOTAL SCORE: 7
7. FEELING AFRAID AS IF SOMETHING AWFUL MIGHT HAPPEN: SEVERAL DAYS
1. FEELING NERVOUS, ANXIOUS, OR ON EDGE: SEVERAL DAYS
7. FEELING AFRAID AS IF SOMETHING AWFUL MIGHT HAPPEN: SEVERAL DAYS
3. WORRYING TOO MUCH ABOUT DIFFERENT THINGS: SEVERAL DAYS

## 2024-06-04 NOTE — PROGRESS NOTES
Medicare Annual Wellness Visit    Fahad Arora is here for Medicare AWV    Assessment & Plan   Medicare annual wellness visit, subsequent    Recommendations for Preventive Services Due: see orders and patient instructions/AVS.  Recommended screening schedule for the next 5-10 years is provided to the patient in written form: see Patient Instructions/AVS.     Return for Medicare Annual Wellness Visit in 1 year.     Subjective       Patient's complete Health Risk Assessment and screening values have been reviewed and are found in Flowsheets. The following problems were reviewed today and where indicated follow up appointments were made and/or referrals ordered.    Positive Risk Factor Screenings with Interventions:               General HRA Questions:  Select all that apply: (!) New or Increased Fatigue, Stress, Anger    Fatigue Interventions:  D/t recent death of sister    Stress Interventions:  See above    Anger Interventions:  See above      Activity, Diet, and Weight:  On average, how many days per week do you engage in moderate to strenuous exercise (like a brisk walk)?: 3 days  On average, how many minutes do you engage in exercise at this level?: 60 min    Do you eat balanced/healthy meals regularly?: (!) No    Body mass index is 16.46 kg/m². (!) Abnormal    Do you eat balanced/healthy meals regularly Interventions:  Working on this  Underweight Interventions:  discussed           Safety:  Do you have working smoke detectors?: (!) No  Interventions:  discussed                   Objective   Vitals:    06/04/24 0809   BP: 118/64   Pulse: 76   Temp: 98.5 °F (36.9 °C)   TempSrc: Temporal   SpO2: 94%   Weight: 40.8 kg (90 lb)   Height: 1.575 m (5' 2\")      Body mass index is 16.46 kg/m².             Allergies   Allergen Reactions    Latex Itching     Swelling, itching, and redness    Tramadol Nausea Only     Prior to Visit Medications    Medication Sig Taking? Authorizing Provider   famotidine (PEPCID) 20 MG

## 2024-06-04 NOTE — PROGRESS NOTES
Subjective  CC: Patient presents to follow-up.    HPI: The patient is here today for follow-up.  Unfortunately, her sister passed away in May.  This has been very difficult for the patient.  She had several years of estrangement from her sister due to substance use, and then when her sister became ill recently, the patient began to care for her.  She is very tearful when discussing, and does not feel she needs medication or therapy at this point in time but we did discuss this option.    She followed with cardiology in March, no changes were made.  She did have mammography and thyroid ultrasound completed.  She followed with Dr. Hinkle, and states she was given exercises to complete.  This has been helpful for her back.  I reviewed her bone density scan again with her.  The patient at this point in time declines antiresorptive therapy, but she would like to discuss this again at her upcoming office visit.  She feels she may be ready for new medication at that point in time.  She feels overwhelmed with aspects of life at this point in time.    ROS:  Review of Systems   Constitutional:         Weight loss of about 15-20lbs with CABG, slowly returning to baseline   HENT: Negative.     Eyes:         Wears glasses, dry eyes   Respiratory:          ?COPD   Cardiovascular:         CABGx4   Gastrointestinal: Negative.    Genitourinary: Negative.    Musculoskeletal:         Bilateral hip pain back pain as above   Skin: Negative.    Neurological: Negative.    Psychiatric/Behavioral:          Insomnia          Current Outpatient Medications:     famotidine (PEPCID) 20 MG tablet, Take 1 tablet by mouth 2 times daily, Disp: 60 tablet, Rfl: 3    metoprolol tartrate (LOPRESSOR) 25 MG tablet, Take 1 tablet by mouth 2 times daily, Disp: 60 tablet, Rfl: 3    atorvastatin (LIPITOR) 80 MG tablet, Take 1 tablet by mouth daily, Disp: 30 tablet, Rfl: 3    albuterol sulfate HFA (VENTOLIN HFA) 108 (90 Base) MCG/ACT inhaler, Inhale 2 puffs

## 2024-06-04 NOTE — PATIENT INSTRUCTIONS
seafood, lean meats and poultry, eggs, beans, peas, nuts, seeds, and soy products.     Limit drinks and foods with added sugar. These include candy, desserts, and soda pop.   Heart-healthy lifestyle    If your doctor recommends it, get more exercise. For many people, walking is a good choice. Or you may want to swim, bike, or do other activities. Bit by bit, increase the time you're active every day. Try for at least 30 minutes on most days of the week.     Try to quit or cut back on using tobacco and other nicotine products. This includes smoking and vaping. If you need help quitting, talk to your doctor about stop-smoking programs and medicines. These can increase your chances of quitting for good. Quitting is one of the most important things you can do to protect your heart. It is never too late to quit. Try to avoid secondhand smoke too.     Stay at a weight that's healthy for you. Talk to your doctor if you need help losing weight.     Try to get 7 to 9 hours of sleep each night.     Limit alcohol to 2 drinks a day for men and 1 drink a day for women. Too much alcohol can cause health problems.     Manage other health problems such as diabetes, high blood pressure, and high cholesterol. If you think you may have a problem with alcohol or drug use, talk to your doctor.   Medicines    Take your medicines exactly as prescribed. Call your doctor if you think you are having a problem with your medicine.     If your doctor recommends aspirin, take the amount directed each day. Make sure you take aspirin and not another kind of pain reliever, such as acetaminophen (Tylenol).   When should you call for help?   Call 911 if you have symptoms of a heart attack. These may include:    Chest pain or pressure, or a strange feeling in the chest.     Sweating.     Shortness of breath.     Pain, pressure, or a strange feeling in the back, neck, jaw, or upper belly or in one or both shoulders or arms.     Lightheadedness or

## 2024-07-01 DIAGNOSIS — E78.2 MIXED HYPERLIPIDEMIA: Primary | ICD-10-CM

## 2024-07-01 DIAGNOSIS — I25.10 CAD IN NATIVE ARTERY: ICD-10-CM

## 2024-07-01 RX ORDER — ROSUVASTATIN CALCIUM 40 MG/1
40 TABLET, COATED ORAL DAILY
Qty: 30 TABLET | Refills: 5 | Status: SHIPPED | OUTPATIENT
Start: 2024-07-01

## 2024-07-01 RX ORDER — FAMOTIDINE 20 MG/1
20 TABLET, FILM COATED ORAL 2 TIMES DAILY
Qty: 60 TABLET | Refills: 3 | Status: SHIPPED | OUTPATIENT
Start: 2024-07-01

## 2024-08-12 NOTE — TELEPHONE ENCOUNTER
Last Appointment:  6/4/2024  Future Appointments   Date Time Provider Department Center   12/10/2024 10:00 AM Karen Ledbetter APRN - CNP [facility]

## 2024-08-13 RX ORDER — NITROGLYCERIN 0.4 MG/1
0.4 TABLET SUBLINGUAL EVERY 5 MIN PRN
Qty: 25 TABLET | Refills: 1 | Status: SHIPPED | OUTPATIENT
Start: 2024-08-13

## 2024-10-17 DIAGNOSIS — I25.10 CAD IN NATIVE ARTERY: ICD-10-CM

## 2024-10-18 RX ORDER — FAMOTIDINE 20 MG/1
20 TABLET, FILM COATED ORAL 2 TIMES DAILY
Qty: 60 TABLET | Refills: 3 | Status: SHIPPED | OUTPATIENT
Start: 2024-10-18

## 2024-10-18 RX ORDER — METOPROLOL TARTRATE 25 MG/1
25 TABLET, FILM COATED ORAL 2 TIMES DAILY
Qty: 60 TABLET | Refills: 3 | Status: SHIPPED | OUTPATIENT
Start: 2024-10-18

## 2024-10-18 NOTE — TELEPHONE ENCOUNTER
Last Appointment:  6/4/2024  Future Appointments   Date Time Provider Department Center   12/10/2024 10:00 AM Karen Ledbetter APRN - CNP [facility]

## 2024-11-18 DIAGNOSIS — E78.2 MIXED HYPERLIPIDEMIA: ICD-10-CM

## 2024-11-18 LAB
ALBUMIN: 4.3 G/DL (ref 3.5–5.2)
ALP BLD-CCNC: 108 U/L (ref 35–104)
ALT SERPL-CCNC: 37 U/L (ref 0–32)
ANION GAP SERPL CALCULATED.3IONS-SCNC: 12 MMOL/L (ref 7–16)
AST SERPL-CCNC: 43 U/L (ref 0–31)
BILIRUB SERPL-MCNC: 0.3 MG/DL (ref 0–1.2)
BUN BLDV-MCNC: 22 MG/DL (ref 6–23)
CALCIUM SERPL-MCNC: 9.4 MG/DL (ref 8.6–10.2)
CHLORIDE BLD-SCNC: 103 MMOL/L (ref 98–107)
CHOLESTEROL, TOTAL: 145 MG/DL
CO2: 24 MMOL/L (ref 22–29)
CREAT SERPL-MCNC: 1.1 MG/DL (ref 0.5–1)
GFR, ESTIMATED: 53 ML/MIN/1.73M2
GLUCOSE BLD-MCNC: 61 MG/DL (ref 74–99)
HDLC SERPL-MCNC: 52 MG/DL
LDL CHOLESTEROL: 78 MG/DL
POTASSIUM SERPL-SCNC: 4.2 MMOL/L (ref 3.5–5)
SODIUM BLD-SCNC: 139 MMOL/L (ref 132–146)
TOTAL PROTEIN: 7.2 G/DL (ref 6.4–8.3)
TRIGL SERPL-MCNC: 76 MG/DL
VLDLC SERPL CALC-MCNC: 15 MG/DL

## 2024-12-10 ENCOUNTER — OFFICE VISIT (OUTPATIENT)
Dept: PRIMARY CARE CLINIC | Age: 74
End: 2024-12-10
Payer: MEDICARE

## 2024-12-10 VITALS
DIASTOLIC BLOOD PRESSURE: 70 MMHG | TEMPERATURE: 97.9 F | WEIGHT: 90 LBS | RESPIRATION RATE: 20 BRPM | OXYGEN SATURATION: 96 % | SYSTOLIC BLOOD PRESSURE: 132 MMHG | HEIGHT: 62 IN | HEART RATE: 70 BPM | BODY MASS INDEX: 16.56 KG/M2

## 2024-12-10 DIAGNOSIS — M81.0 AGE-RELATED OSTEOPOROSIS WITHOUT CURRENT PATHOLOGICAL FRACTURE: ICD-10-CM

## 2024-12-10 DIAGNOSIS — R74.8 ELEVATED LIVER ENZYMES: ICD-10-CM

## 2024-12-10 DIAGNOSIS — R73.09 ELEVATED GLUCOSE: ICD-10-CM

## 2024-12-10 DIAGNOSIS — E78.2 MIXED HYPERLIPIDEMIA: Primary | ICD-10-CM

## 2024-12-10 DIAGNOSIS — E78.2 MIXED HYPERLIPIDEMIA: ICD-10-CM

## 2024-12-10 DIAGNOSIS — I25.10 CAD IN NATIVE ARTERY: ICD-10-CM

## 2024-12-10 LAB
ALBUMIN: 4.4 G/DL (ref 3.5–5.2)
ALP BLD-CCNC: 116 U/L (ref 35–104)
ALT SERPL-CCNC: 38 U/L (ref 0–32)
ANION GAP SERPL CALCULATED.3IONS-SCNC: 8 MMOL/L (ref 7–16)
AST SERPL-CCNC: 44 U/L (ref 0–31)
BILIRUB SERPL-MCNC: 0.3 MG/DL (ref 0–1.2)
BUN BLDV-MCNC: 18 MG/DL (ref 6–23)
CALCIUM SERPL-MCNC: 9.5 MG/DL (ref 8.6–10.2)
CHLORIDE BLD-SCNC: 103 MMOL/L (ref 98–107)
CO2: 29 MMOL/L (ref 22–29)
CREAT SERPL-MCNC: 1.1 MG/DL (ref 0.5–1)
GFR, ESTIMATED: 55 ML/MIN/1.73M2
GLUCOSE BLD-MCNC: 90 MG/DL (ref 74–99)
HBA1C MFR BLD: 6.2 % (ref 4–5.6)
POTASSIUM SERPL-SCNC: 4.6 MMOL/L (ref 3.5–5)
SODIUM BLD-SCNC: 140 MMOL/L (ref 132–146)
TOTAL PROTEIN: 7.2 G/DL (ref 6.4–8.3)

## 2024-12-10 PROCEDURE — G8399 PT W/DXA RESULTS DOCUMENT: HCPCS | Performed by: NURSE PRACTITIONER

## 2024-12-10 PROCEDURE — G2211 COMPLEX E/M VISIT ADD ON: HCPCS | Performed by: NURSE PRACTITIONER

## 2024-12-10 PROCEDURE — 3075F SYST BP GE 130 - 139MM HG: CPT | Performed by: NURSE PRACTITIONER

## 2024-12-10 PROCEDURE — 1036F TOBACCO NON-USER: CPT | Performed by: NURSE PRACTITIONER

## 2024-12-10 PROCEDURE — G8484 FLU IMMUNIZE NO ADMIN: HCPCS | Performed by: NURSE PRACTITIONER

## 2024-12-10 PROCEDURE — G8427 DOCREV CUR MEDS BY ELIG CLIN: HCPCS | Performed by: NURSE PRACTITIONER

## 2024-12-10 PROCEDURE — 3078F DIAST BP <80 MM HG: CPT | Performed by: NURSE PRACTITIONER

## 2024-12-10 PROCEDURE — G8419 CALC BMI OUT NRM PARAM NOF/U: HCPCS | Performed by: NURSE PRACTITIONER

## 2024-12-10 PROCEDURE — 3017F COLORECTAL CA SCREEN DOC REV: CPT | Performed by: NURSE PRACTITIONER

## 2024-12-10 PROCEDURE — 1123F ACP DISCUSS/DSCN MKR DOCD: CPT | Performed by: NURSE PRACTITIONER

## 2024-12-10 PROCEDURE — 1090F PRES/ABSN URINE INCON ASSESS: CPT | Performed by: NURSE PRACTITIONER

## 2024-12-10 PROCEDURE — 99213 OFFICE O/P EST LOW 20 MIN: CPT | Performed by: NURSE PRACTITIONER

## 2024-12-10 PROCEDURE — 1159F MED LIST DOCD IN RCRD: CPT | Performed by: NURSE PRACTITIONER

## 2024-12-10 RX ORDER — ROSUVASTATIN CALCIUM 40 MG/1
40 TABLET, COATED ORAL DAILY
Qty: 30 TABLET | Refills: 5 | Status: SHIPPED | OUTPATIENT
Start: 2024-12-10

## 2024-12-10 ASSESSMENT — ENCOUNTER SYMPTOMS: GASTROINTESTINAL NEGATIVE: 1

## 2024-12-10 NOTE — PROGRESS NOTES
Subjective  CC: Patient presents to follow-up.    HPI: The patient is here today for follow-up.  Her weight has been stable.  She continues to process the grief of losing her sister, but feels she is doing well in regards to this and does not wish any therapy or pharmacotherapy.  We did again discuss her bone density scan, she declines antiresorptive therapy.  I recommend for us to repeat this in January 2026 to continue to monitor for any changes.  She did have lab work completed in November, LDL was improved, but her creatinine and liver enzymes were both slightly elevated.  I will recheck this today along with a hemoglobin A1c which was 6.5 at her last office visit.  The patient is following with ophthalmology for an abnormality of her right eye.  She is uncertain what this is, but states that it is being monitored.  She denies any changes.    ROS:  Review of Systems   Constitutional:         Weight loss of about 15-20lbs with CABG, slowly returning to baseline   HENT: Negative.     Eyes:         Wears glasses, dry eyes   Respiratory:          ?COPD   Cardiovascular:         CABGx4   Gastrointestinal: Negative.    Genitourinary: Negative.    Musculoskeletal:         Bilateral hip pain back pain as above   Skin: Negative.    Neurological: Negative.    Psychiatric/Behavioral:          Insomnia          Current Outpatient Medications:     rosuvastatin (CRESTOR) 40 MG tablet, Take 1 tablet by mouth daily, Disp: 30 tablet, Rfl: 5    famotidine (PEPCID) 20 MG tablet, Take 1 tablet by mouth 2 times daily, Disp: 60 tablet, Rfl: 3    metoprolol tartrate (LOPRESSOR) 25 MG tablet, Take 1 tablet by mouth 2 times daily, Disp: 60 tablet, Rfl: 3    nitroGLYCERIN (NITROSTAT) 0.4 MG SL tablet, Place 1 tablet under the tongue every 5 minutes as needed for Chest pain, Disp: 25 tablet, Rfl: 1    albuterol sulfate HFA (VENTOLIN HFA) 108 (90 Base) MCG/ACT inhaler, Inhale 2 puffs into the lungs 4 times daily as needed for Wheezing,

## 2024-12-11 DIAGNOSIS — R74.8 ELEVATED LIVER ENZYMES: Primary | ICD-10-CM

## 2024-12-17 DIAGNOSIS — R74.8 ELEVATED LIVER ENZYMES: Primary | ICD-10-CM

## 2025-02-13 NOTE — PLAN OF CARE
----- Message from Torey Downing MD sent at 2/13/2025  7:05 AM CST -----  Patient is COVID-positive and should quarantine following CDC guidance.  If he wishes for Paxlovid therapy he needs to get a stat BMP to test for kidney function before a prescription can be sent   Problem: Pain:  Goal: Pain level will decrease  Description: Pain level will decrease  6/14/2021 0844 by Sunita Bee RN  Outcome: Met This Shift  6/14/2021 0354 by Aidee Pabon RN  Outcome: Met This Shift     Problem: Falls - Risk of:  Goal: Will remain free from falls  Description: Will remain free from falls  6/14/2021 0844 by Sunita Bee RN  Outcome: Met This Shift  6/14/2021 0354 by Aidee Pabon RN  Outcome: Met This Shift     Problem: Pain:  Goal: Pain level will decrease  Description: Pain level will decrease  6/14/2021 0844 by Sunita Bee RN  Outcome: Met This Shift  6/14/2021 0354 by Aidee Pabon RN  Outcome: Met This Shift     Problem: Skin Integrity:  Goal: Will show no infection signs and symptoms  Description: Will show no infection signs and symptoms  Outcome: Met This Shift     Problem: Gas Exchange - Impaired:  Goal: Levels of oxygenation will improve  Description: Levels of oxygenation will improve  Outcome: Ongoing

## 2025-02-14 DIAGNOSIS — I25.10 CAD IN NATIVE ARTERY: ICD-10-CM

## 2025-02-14 RX ORDER — METOPROLOL TARTRATE 25 MG/1
25 TABLET, FILM COATED ORAL 2 TIMES DAILY
Qty: 60 TABLET | Refills: 3 | Status: SHIPPED | OUTPATIENT
Start: 2025-02-14

## 2025-02-14 RX ORDER — FAMOTIDINE 20 MG/1
20 TABLET, FILM COATED ORAL 2 TIMES DAILY
Qty: 60 TABLET | Refills: 3 | Status: SHIPPED | OUTPATIENT
Start: 2025-02-14

## 2025-02-14 NOTE — TELEPHONE ENCOUNTER
Name of Medication(s) Requested:  Requested Prescriptions     Pending Prescriptions Disp Refills    famotidine (PEPCID) 20 MG tablet 60 tablet 3     Sig: Take 1 tablet by mouth 2 times daily    metoprolol tartrate (LOPRESSOR) 25 MG tablet 60 tablet 3     Sig: Take 1 tablet by mouth 2 times daily       Medication is on current medication list Yes    Dosage and directions were verified? Yes    Quantity verified: 30 day supply     Pharmacy Verified?  Yes    Last Appointment:  12/10/2024    Future appts:  Future Appointments   Date Time Provider Department Center   6/12/2025  1:00 PM Gustine, Karen, APRN - CNP [facility]   6/12/2025  1:15 PM Gustine, Karen, APRN - CNP [facility]        (If no appt send self scheduling link. .REFILLAPPT)  Scheduling request sent?     [] Yes  [x] No    Does patient need updated?  [] Yes  [x] No

## 2025-06-09 DIAGNOSIS — I25.10 CAD IN NATIVE ARTERY: ICD-10-CM

## 2025-06-09 DIAGNOSIS — E78.2 MIXED HYPERLIPIDEMIA: ICD-10-CM

## 2025-06-09 SDOH — ECONOMIC STABILITY: FOOD INSECURITY: WITHIN THE PAST 12 MONTHS, YOU WORRIED THAT YOUR FOOD WOULD RUN OUT BEFORE YOU GOT MONEY TO BUY MORE.: NEVER TRUE

## 2025-06-09 SDOH — ECONOMIC STABILITY: FOOD INSECURITY: WITHIN THE PAST 12 MONTHS, THE FOOD YOU BOUGHT JUST DIDN'T LAST AND YOU DIDN'T HAVE MONEY TO GET MORE.: NEVER TRUE

## 2025-06-09 SDOH — ECONOMIC STABILITY: INCOME INSECURITY: IN THE LAST 12 MONTHS, WAS THERE A TIME WHEN YOU WERE NOT ABLE TO PAY THE MORTGAGE OR RENT ON TIME?: NO

## 2025-06-09 SDOH — ECONOMIC STABILITY: TRANSPORTATION INSECURITY
IN THE PAST 12 MONTHS, HAS THE LACK OF TRANSPORTATION KEPT YOU FROM MEDICAL APPOINTMENTS OR FROM GETTING MEDICATIONS?: NO

## 2025-06-09 SDOH — ECONOMIC STABILITY: TRANSPORTATION INSECURITY
IN THE PAST 12 MONTHS, HAS LACK OF TRANSPORTATION KEPT YOU FROM MEETINGS, WORK, OR FROM GETTING THINGS NEEDED FOR DAILY LIVING?: NO

## 2025-06-09 ASSESSMENT — PATIENT HEALTH QUESTIONNAIRE - PHQ9
SUM OF ALL RESPONSES TO PHQ QUESTIONS 1-9: 0
2. FEELING DOWN, DEPRESSED OR HOPELESS: NOT AT ALL
1. LITTLE INTEREST OR PLEASURE IN DOING THINGS: NOT AT ALL
2. FEELING DOWN, DEPRESSED OR HOPELESS: NOT AT ALL
1. LITTLE INTEREST OR PLEASURE IN DOING THINGS: NOT AT ALL
SUM OF ALL RESPONSES TO PHQ9 QUESTIONS 1 & 2: 0
SUM OF ALL RESPONSES TO PHQ QUESTIONS 1-9: 0

## 2025-06-10 RX ORDER — ROSUVASTATIN CALCIUM 40 MG/1
40 TABLET, COATED ORAL DAILY
Qty: 30 TABLET | Refills: 5 | Status: SHIPPED | OUTPATIENT
Start: 2025-06-10

## 2025-06-10 RX ORDER — FAMOTIDINE 20 MG/1
20 TABLET, FILM COATED ORAL 2 TIMES DAILY
Qty: 60 TABLET | Refills: 3 | Status: SHIPPED | OUTPATIENT
Start: 2025-06-10

## 2025-06-10 RX ORDER — METOPROLOL TARTRATE 25 MG/1
25 TABLET, FILM COATED ORAL 2 TIMES DAILY
Qty: 60 TABLET | Refills: 3 | Status: SHIPPED | OUTPATIENT
Start: 2025-06-10

## 2025-06-12 ENCOUNTER — OFFICE VISIT (OUTPATIENT)
Dept: PRIMARY CARE CLINIC | Age: 75
End: 2025-06-12

## 2025-06-12 ENCOUNTER — TELEPHONE (OUTPATIENT)
Dept: PRIMARY CARE CLINIC | Age: 75
End: 2025-06-12

## 2025-06-12 VITALS
OXYGEN SATURATION: 97 % | BODY MASS INDEX: 16.75 KG/M2 | TEMPERATURE: 98.7 F | SYSTOLIC BLOOD PRESSURE: 128 MMHG | HEIGHT: 62 IN | WEIGHT: 91 LBS | DIASTOLIC BLOOD PRESSURE: 68 MMHG | HEART RATE: 74 BPM | RESPIRATION RATE: 18 BRPM

## 2025-06-12 VITALS
DIASTOLIC BLOOD PRESSURE: 68 MMHG | OXYGEN SATURATION: 97 % | HEART RATE: 74 BPM | RESPIRATION RATE: 18 BRPM | BODY MASS INDEX: 16.75 KG/M2 | WEIGHT: 91 LBS | SYSTOLIC BLOOD PRESSURE: 128 MMHG | TEMPERATURE: 98.7 F | HEIGHT: 62 IN

## 2025-06-12 DIAGNOSIS — I10 PRIMARY HYPERTENSION: ICD-10-CM

## 2025-06-12 DIAGNOSIS — Z00.00 MEDICARE ANNUAL WELLNESS VISIT, SUBSEQUENT: Primary | ICD-10-CM

## 2025-06-12 DIAGNOSIS — I25.10 CAD IN NATIVE ARTERY: Primary | ICD-10-CM

## 2025-06-12 DIAGNOSIS — R74.8 ELEVATED LIVER ENZYMES: ICD-10-CM

## 2025-06-12 DIAGNOSIS — R73.09 ELEVATED GLUCOSE: ICD-10-CM

## 2025-06-12 DIAGNOSIS — I25.10 CAD IN NATIVE ARTERY: ICD-10-CM

## 2025-06-12 DIAGNOSIS — E78.2 MIXED HYPERLIPIDEMIA: ICD-10-CM

## 2025-06-12 DIAGNOSIS — Z12.11 COLON CANCER SCREENING: ICD-10-CM

## 2025-06-12 DIAGNOSIS — J44.9 CHRONIC OBSTRUCTIVE PULMONARY DISEASE, UNSPECIFIED COPD TYPE (HCC): ICD-10-CM

## 2025-06-12 LAB
ALBUMIN: 4.1 G/DL (ref 3.5–5.2)
ALP BLD-CCNC: 104 U/L (ref 35–104)
ALT SERPL-CCNC: 29 U/L (ref 0–35)
ANION GAP SERPL CALCULATED.3IONS-SCNC: 11 MMOL/L (ref 7–16)
AST SERPL-CCNC: 39 U/L (ref 0–35)
BASOPHILS ABSOLUTE: 0.11 K/UL (ref 0–0.2)
BASOPHILS RELATIVE PERCENT: 2 % (ref 0–2)
BILIRUB SERPL-MCNC: 0.3 MG/DL (ref 0–1.2)
BUN BLDV-MCNC: 24 MG/DL (ref 8–23)
CALCIUM SERPL-MCNC: 9.5 MG/DL (ref 8.8–10.2)
CHLORIDE BLD-SCNC: 105 MMOL/L (ref 98–107)
CHOLESTEROL, TOTAL: 135 MG/DL
CO2: 25 MMOL/L (ref 22–29)
CREAT SERPL-MCNC: 1.2 MG/DL (ref 0.5–1)
EOSINOPHILS ABSOLUTE: 0.12 K/UL (ref 0.05–0.5)
EOSINOPHILS RELATIVE PERCENT: 2 % (ref 0–6)
GFR, ESTIMATED: 48 ML/MIN/1.73M2
GLUCOSE BLD-MCNC: 118 MG/DL (ref 74–99)
HBA1C MFR BLD: 6.4 % (ref 4–5.6)
HCT VFR BLD CALC: 41.2 % (ref 34–48)
HDLC SERPL-MCNC: 57 MG/DL
HEMOGLOBIN: 12.9 G/DL (ref 11.5–15.5)
IMMATURE GRANULOCYTES %: 0 % (ref 0–5)
IMMATURE GRANULOCYTES ABSOLUTE: <0.03 K/UL (ref 0–0.58)
LDL CHOLESTEROL: 62 MG/DL
LYMPHOCYTES ABSOLUTE: 1.96 K/UL (ref 1.5–4)
LYMPHOCYTES RELATIVE PERCENT: 27 % (ref 20–42)
MCH RBC QN AUTO: 29.8 PG (ref 26–35)
MCHC RBC AUTO-ENTMCNC: 31.3 G/DL (ref 32–34.5)
MCV RBC AUTO: 95.2 FL (ref 80–99.9)
MONOCYTES ABSOLUTE: 0.8 K/UL (ref 0.1–0.95)
MONOCYTES RELATIVE PERCENT: 11 % (ref 2–12)
NEUTROPHILS ABSOLUTE: 4.2 K/UL (ref 1.8–7.3)
NEUTROPHILS RELATIVE PERCENT: 58 % (ref 43–80)
PDW BLD-RTO: 13.8 % (ref 11.5–15)
PLATELET # BLD: 196 K/UL (ref 130–450)
PMV BLD AUTO: 10.7 FL (ref 7–12)
POTASSIUM SERPL-SCNC: 4.6 MMOL/L (ref 3.5–5.1)
RBC # BLD: 4.33 M/UL (ref 3.5–5.5)
SODIUM BLD-SCNC: 141 MMOL/L (ref 136–145)
TOTAL PROTEIN: 6.9 G/DL (ref 6.4–8.3)
TRIGL SERPL-MCNC: 78 MG/DL
TSH SERPL DL<=0.05 MIU/L-ACNC: 1.05 UIU/ML (ref 0.27–4.2)
VLDLC SERPL CALC-MCNC: 16 MG/DL
WBC # BLD: 7.2 K/UL (ref 4.5–11.5)

## 2025-06-12 RX ORDER — ALBUTEROL SULFATE 90 UG/1
2 INHALANT RESPIRATORY (INHALATION) 4 TIMES DAILY PRN
Qty: 18 G | Refills: 5 | Status: SHIPPED | OUTPATIENT
Start: 2025-06-12

## 2025-06-12 SDOH — HEALTH STABILITY: PHYSICAL HEALTH: ON AVERAGE, HOW MANY DAYS PER WEEK DO YOU ENGAGE IN MODERATE TO STRENUOUS EXERCISE (LIKE A BRISK WALK)?: 3 DAYS

## 2025-06-12 SDOH — HEALTH STABILITY: PHYSICAL HEALTH: ON AVERAGE, HOW MANY MINUTES DO YOU ENGAGE IN EXERCISE AT THIS LEVEL?: 50 MIN

## 2025-06-12 ASSESSMENT — ANXIETY QUESTIONNAIRES
6. BECOMING EASILY ANNOYED OR IRRITABLE: NOT AT ALL
4. TROUBLE RELAXING: NOT AT ALL
1. FEELING NERVOUS, ANXIOUS, OR ON EDGE: SEVERAL DAYS
5. BEING SO RESTLESS THAT IT IS HARD TO SIT STILL: NOT AT ALL
GAD7 TOTAL SCORE: 2
2. NOT BEING ABLE TO STOP OR CONTROL WORRYING: SEVERAL DAYS
3. WORRYING TOO MUCH ABOUT DIFFERENT THINGS: NOT AT ALL
5. BEING SO RESTLESS THAT IT IS HARD TO SIT STILL: NOT AT ALL
3. WORRYING TOO MUCH ABOUT DIFFERENT THINGS: NOT AT ALL
IF YOU CHECKED OFF ANY PROBLEMS ON THIS QUESTIONNAIRE, HOW DIFFICULT HAVE THESE PROBLEMS MADE IT FOR YOU TO DO YOUR WORK, TAKE CARE OF THINGS AT HOME, OR GET ALONG WITH OTHER PEOPLE: NOT DIFFICULT AT ALL
IF YOU CHECKED OFF ANY PROBLEMS ON THIS QUESTIONNAIRE, HOW DIFFICULT HAVE THESE PROBLEMS MADE IT FOR YOU TO DO YOUR WORK, TAKE CARE OF THINGS AT HOME, OR GET ALONG WITH OTHER PEOPLE: NOT DIFFICULT AT ALL
6. BECOMING EASILY ANNOYED OR IRRITABLE: NOT AT ALL
1. FEELING NERVOUS, ANXIOUS, OR ON EDGE: SEVERAL DAYS
7. FEELING AFRAID AS IF SOMETHING AWFUL MIGHT HAPPEN: NOT AT ALL
7. FEELING AFRAID AS IF SOMETHING AWFUL MIGHT HAPPEN: NOT AT ALL
4. TROUBLE RELAXING: NOT AT ALL
2. NOT BEING ABLE TO STOP OR CONTROL WORRYING: SEVERAL DAYS

## 2025-06-12 ASSESSMENT — PATIENT HEALTH QUESTIONNAIRE - PHQ9
SUM OF ALL RESPONSES TO PHQ QUESTIONS 1-9: 1
1. LITTLE INTEREST OR PLEASURE IN DOING THINGS: SEVERAL DAYS
SUM OF ALL RESPONSES TO PHQ QUESTIONS 1-9: 1
2. FEELING DOWN, DEPRESSED OR HOPELESS: NOT AT ALL

## 2025-06-12 ASSESSMENT — ENCOUNTER SYMPTOMS: GASTROINTESTINAL NEGATIVE: 1

## 2025-06-12 ASSESSMENT — LIFESTYLE VARIABLES
HOW OFTEN DO YOU HAVE SIX OR MORE DRINKS ON ONE OCCASION: 1
HOW OFTEN DO YOU HAVE A DRINK CONTAINING ALCOHOL: 1
HOW OFTEN DO YOU HAVE A DRINK CONTAINING ALCOHOL: NEVER
HOW MANY STANDARD DRINKS CONTAINING ALCOHOL DO YOU HAVE ON A TYPICAL DAY: PATIENT DOES NOT DRINK
HOW MANY STANDARD DRINKS CONTAINING ALCOHOL DO YOU HAVE ON A TYPICAL DAY: 0

## 2025-06-12 NOTE — PROGRESS NOTES
OIL PO) Take by mouth Yes Donal Barger MD   Blood Pressure KIT 1 kit by Does not apply route 2 times daily Yes Karen Ledbetter APRN - CNP   aspirin 81 MG chewable tablet Take 1 tablet by mouth daily Yes ProviderDonal MD       CareTeam (Including outside providers/suppliers regularly involved in providing care):   Patient Care Team:  Karen Ledbetter APRN - CNP as PCP - General (Family Medicine)  Karen Ledbetter APRN - CNP as PCP - Empaneled Provider     Recommendations for Preventive Services Due: see orders and patient instructions/AVS.  Recommended screening schedule for the next 5-10 years is provided to the patient in written form: see Patient Instructions/AVS.     Reviewed and updated this visit:  Tobacco  Allergies  Meds

## 2025-06-12 NOTE — PROGRESS NOTES
Subjective  CC: Patient presents to follow-up.    HPI: The patient presents today for follow-up. She has been doing well since the last office visit. Her AWV is completed and reviewed today. She is up to date on breast cancer screenings. Blood pressure is under good control. She has not had any chest pain or shortness of breath. She reports she saw cardiology earlier this year, no changes, consult note will be obtained.    Rare use of albuterol. COPD is well controlled.    ROS:  Review of Systems   Constitutional:         Weight loss of about 15-20lbs with CABG, slowly returning to baseline   HENT: Negative.     Eyes:         Wears glasses, dry eyes   Respiratory:          ?COPD   Cardiovascular:         CABGx4   Gastrointestinal: Negative.    Genitourinary: Negative.    Musculoskeletal: Negative.    Skin: Negative.    Neurological: Negative.    Psychiatric/Behavioral:          Insomnia          Current Outpatient Medications:     albuterol sulfate HFA (VENTOLIN HFA) 108 (90 Base) MCG/ACT inhaler, Inhale 2 puffs into the lungs 4 times daily as needed for Wheezing, Disp: 18 g, Rfl: 5    rosuvastatin (CRESTOR) 40 MG tablet, Take 1 tablet by mouth daily, Disp: 30 tablet, Rfl: 5    famotidine (PEPCID) 20 MG tablet, Take 1 tablet by mouth 2 times daily, Disp: 60 tablet, Rfl: 3    metoprolol tartrate (LOPRESSOR) 25 MG tablet, Take 1 tablet by mouth 2 times daily, Disp: 60 tablet, Rfl: 3    nitroGLYCERIN (NITROSTAT) 0.4 MG SL tablet, Place 1 tablet under the tongue every 5 minutes as needed for Chest pain, Disp: 25 tablet, Rfl: 1    Flaxseed, Linseed, (FLAXSEED OIL PO), Take by mouth, Disp: , Rfl:     Blood Pressure KIT, 1 kit by Does not apply route 2 times daily, Disp: 1 kit, Rfl: 0    aspirin 81 MG chewable tablet, Take 1 tablet by mouth daily, Disp: , Rfl:    Allergies   Allergen Reactions    Latex Itching     Swelling, itching, and redness    Tramadol Nausea Only        PMH:  Past Medical History:   Diagnosis Date     negative

## 2025-06-13 ENCOUNTER — RESULTS FOLLOW-UP (OUTPATIENT)
Dept: PRIMARY CARE CLINIC | Age: 75
End: 2025-06-13

## 2025-07-24 LAB — NONINV COLON CA DNA+OCC BLD SCRN STL QL: NEGATIVE

## (undated) DEVICE — LANCET AORTOTOMY 3.3X10MM

## (undated) DEVICE — BLADE CLIPPER GEN PURP NS

## (undated) DEVICE — DRAIN SURG L3/8-1/2IN DIA3/16IN SIL CARD CONN 1:1 BLAK

## (undated) DEVICE — CAMERA CARDIAC STRYKER 1488 HD

## (undated) DEVICE — SET SURG BASIN OPEN HEART NO  1 REUSABLE

## (undated) DEVICE — INTENDED FOR TISSUE SEPARATION, AND OTHER PROCEDURES THAT REQUIRE A SHARP SURGICAL BLADE TO PUNCTURE OR CUT.: Brand: BARD-PARKER ® STAINLESS STEEL BLADES

## (undated) DEVICE — SOLUTION IV IRRIG WATER 1000ML POUR BRL 2F7114

## (undated) DEVICE — CANNULA INJ L2.5IN BLNT TIP 3MM CLR BODY W/ 1 W VLV DLP

## (undated) DEVICE — SOLUTION IV IRRIG POUR BRL 0.9% SODIUM CHL 2F7124

## (undated) DEVICE — GLOVE ORANGE PI 7 1/2   MSG9075

## (undated) DEVICE — SET ACB VEIN

## (undated) DEVICE — ELECTRODE BLDE L6.5IN CAUT EXT DISP

## (undated) DEVICE — TAPE ADH W2INXL10YD WHT PAPR GENTLE BRTH FLX COMFORTABLE

## (undated) DEVICE — TAPE ADH W2INXL10YD PLAS TRNSPAR H2O RESIST HYPOALRG CURAD

## (undated) DEVICE — Device: Brand: CLEANCUT ROTATING AORTIC PUNCH

## (undated) DEVICE — CATHETER,URETHRAL,REDRUBBER,STRL,18FR: Brand: MEDLINE

## (undated) DEVICE — KIT SURG PREP POVIDONE IOD WET FOR UNIV USE SPNG STK

## (undated) DEVICE — E-Z CLEAN, NON-STICK, PTFE COATED, ELECTROSURGICAL BLADE ELECTRODE, MODIFIED EXTENDED INSULATION, 6.5 INCH (16.5 CM): Brand: MEGADYNE

## (undated) DEVICE — CLIP LIG M BLU TI HRT SHP WIRE HORZ 600 PER BX

## (undated) DEVICE — YANKAUER,OPEN TIP,W/O VENT,STERILE: Brand: MEDLINE INDUSTRIES, INC.

## (undated) DEVICE — AGENT HEMSTAT W4XL8IN OXIDIZED REGENERATED CELOS ABSRB

## (undated) DEVICE — GLOVE SURG SZ 65 THK91MIL LTX FREE SYN POLYISOPRENE

## (undated) DEVICE — CARDIAC STRYKER STERNAL SAW

## (undated) DEVICE — GLOVE SURG SZ 6.5 L11.2IN FNGR THK9.8MIL STRW LTX POLYMER

## (undated) DEVICE — BLOOD TRANSFUSION FILTER: Brand: HAEMONETICS

## (undated) DEVICE — TOWEL,OR,DSP,ST,BLUE,STD,6/PK,12PK/CS: Brand: MEDLINE

## (undated) DEVICE — PACK OPEN HRT DRP

## (undated) DEVICE — MICROPUNCTURE INTRODUCER SET SILHOUETTE TRANSITIONLESS PUSH-PLUS DESIGN - STIFFENED CANNULA WITH NITINOL WIRE GUIDE: Brand: MICROPUNCTURE

## (undated) DEVICE — DRAIN CHN 19FR L0.25MM DIA6.3MM SIL RND HUBLESS FULL FLUT

## (undated) DEVICE — 1.5L THIN WALL CAN: Brand: CRD

## (undated) DEVICE — Device

## (undated) DEVICE — SURGICAL PROCEDURE PACK CRD SEHC

## (undated) DEVICE — EZ GLIDE AORTIC CANNULA: Brand: EDWARDS LIFESCIENCES EZ GLIDE AORTIC CANNULA

## (undated) DEVICE — CONNECTOR DRNGE W3/8-0.5XH3/16XL3/16IN 2:1 SIL CARD STR

## (undated) DEVICE — ADHESIVE SKIN CLSR 0.7ML TOP DERMBND ADV

## (undated) DEVICE — GLOVE SURG SZ 7.5 L11.73IN FNGR THK9.8MIL STRW LTX POLYMER

## (undated) DEVICE — MPS® DELIVERY SET W/ARREST AGENT AND ADDITIVE CASSETTES, HEAT EXCHANGER & 10 FT. DELIVERY TUBING: Brand: MPS

## (undated) DEVICE — RESERVOIR BLD COLLCTN C3000ML 30UM FLTR

## (undated) DEVICE — 3M™ TEGADERM™ CHG DRESSING 25/CARTON 4 CARTONS/CASE 1657: Brand: TEGADERM™

## (undated) DEVICE — Z DISCONTINUED USE 2624852 GLOVE SURG 7 PF TEXT NEOPRNE BRN STRL NEOLON 2G LF

## (undated) DEVICE — INSUFFLATION TUBING SET WITH FILTER, FUNNEL CONNECTOR AND LUER LOCK: Brand: JOSNOE MEDICAL INC

## (undated) DEVICE — PICO 7 10CM X 30CM: Brand: PICO™ 7

## (undated) DEVICE — CANNULA PERF 7FR L5.5IN AORT ROOT RADPQ STD TIP W/ VENT LN

## (undated) DEVICE — 6 FOOT DISPOSABLE EXTENSION CABLE WITH SAFE CONNECT / SCREW-DOWN

## (undated) DEVICE — GLOVE ORANGE PI 7   MSG9070

## (undated) DEVICE — SYRINGE MED 50ML LUERLOCK TIP

## (undated) DEVICE — SET CARDIAC II

## (undated) DEVICE — GOWN,SIRUS,FABRNF,L,20/CS: Brand: MEDLINE

## (undated) DEVICE — BASIC SINGLE BASIN 1-LF: Brand: MEDLINE INDUSTRIES, INC.

## (undated) DEVICE — STAPLER SKIN L39MM DIA0.53MM CRWN 5.7MM S STL FIX HD PROX

## (undated) DEVICE — GARMENT,MEDLINE,DVT,INT,CALF,MED, GEN2: Brand: MEDLINE

## (undated) DEVICE — BLOWER COR ART L16.5CM PLAS SHFT MAL W/ MIST IV SET AXIUS

## (undated) DEVICE — SET CARDIAC I

## (undated) DEVICE — DRESSING FOAM W22XL25CM FILVE LAYR FOAM DP DEF SAFETAC

## (undated) DEVICE — RETRACTOR RULTRACT INTERN MAMMARY ARTERY

## (undated) DEVICE — TIP APPL GEL PLT ENDO 5MMX32CM

## (undated) DEVICE — SURGICAL PROCEDURE PACK BASIC

## (undated) DEVICE — DRAIN SURG SGL COLL PT TB FOR ATS BG OASIS

## (undated) DEVICE — ADHESIVE SKIN CLOSURE TOP 36 CC HI VISC DERMBND MINI

## (undated) DEVICE — CLEANER,CAUTERY TIP,2X2",STERILE: Brand: MEDLINE

## (undated) DEVICE — KIT PLT RATIO DISPNS KT 2IN CANN TIP SPRY TIP DISP MAGELLAN

## (undated) DEVICE — KIT SURG W7XL11IN 2 PKT UNTREATED NA

## (undated) DEVICE — ALCOHOL 70% RUBBING 16OZ

## (undated) DEVICE — TUBING, SUCTION, 3/16" X 12', STRAIGHT: Brand: MEDLINE

## (undated) DEVICE — Z INACTIVE USE 2662641 SOLUTION IV 1000ML 140MEQ/L SOD 5MEQ/L K 3MEQ/L MG 27MEQ/L

## (undated) DEVICE — GRADUATE

## (undated) DEVICE — PACK,UNIVERSAL,NO GOWNS: Brand: MEDLINE

## (undated) DEVICE — GOWN,SIRUS,FABRNF,XL,20/CS: Brand: MEDLINE

## (undated) DEVICE — PATIENT RETURN ELECTRODE, SINGLE-USE, CONTACT QUALITY MONITORING, ADULT, WITH 9FT CORD, FOR PATIENTS WEIGING OVER 33LBS. (15KG): Brand: MEGADYNE

## (undated) DEVICE — SURGICAL BRA: Brand: DEROYAL

## (undated) DEVICE — DRESSING TRNSPAR W4XL45IN FAB FRME SUREVIEW IV

## (undated) DEVICE — PERFUSION PACK CUST OPN HRT

## (undated) DEVICE — RETRACTOR SURG INSRT SUT HLD OCTOBASE

## (undated) DEVICE — CATHETER IV 18GA L1.16IN OD1.308MM ID0.978MM GRN VIALON

## (undated) DEVICE — CLIP INT M L GRN TI TRNSVRS GRV CHEVRON SHP W/ PRECIS TIP

## (undated) DEVICE — KIT SUCT DBL LUMN QLOK STD RESVR BRAT CELL SAVR

## (undated) DEVICE — MPS® PRESSURE LINE W/TRANSDUCER: Brand: MPS

## (undated) DEVICE — SOLUTION IV 50ML 0.9% SOD CHL PLAS CONT USP VIAFLX

## (undated) DEVICE — TOWEL,OR,DSP,ST,WHITE,DLX,4/PK,20PK/CS: Brand: MEDLINE

## (undated) DEVICE — PADDLE INTERN DEFIB CHILD

## (undated) DEVICE — PICO SINGLE USE NEGATIVE PRESSURE WOUND THERAPY SYSTEM 10CM X 30CM 4IN. X 12IN.: Brand: PICO

## (undated) DEVICE — GLOVE SURG SZ 6 THK91MIL LTX FREE SYN POLYISOPRENE ANTI

## (undated) DEVICE — CLOTH SURG PREP PREOPERATIVE CHLORHEXIDINE GLUC 2% READYPREP

## (undated) DEVICE — LABEL MED CARD SURG 4 IN PANEL STRL

## (undated) DEVICE — DOUBLE BASIN SET: Brand: MEDLINE INDUSTRIES, INC.

## (undated) DEVICE — STERILE LATEX POWDER FREE SURGICAL GLOVES WITH HYDROGEL COATING: Brand: PROTEXIS

## (undated) DEVICE — SYRINGE MED 20ML STD CLR PLAS LUERSLIP TIP N CTRL DISP

## (undated) DEVICE — E-Z CLEAN, NON-STICK, PTFE COATED, ELECTROSURGICAL BLADE ELECTRODE, MODIFIED EXTENDED INSULATION, 2.5 INCH (6.35 CM): Brand: MEGADYNE

## (undated) DEVICE — BLADE OPHTH 180DEG CUT SURF BLU STR SHRP DBL BVL GRINDLESS

## (undated) DEVICE — RETROGRADE CARDIOPLEGIA CATHETER: Brand: EDWARDS LIFESCIENCES RETROGRADE CARDIOPLEGIA CATHETER

## (undated) DEVICE — SS SUTURE, 3 PER SLEEVE: Brand: MYO/WIRE II

## (undated) DEVICE — TTL1LYR 16FR10ML 100%SIL TMPST TR: Brand: MEDLINE

## (undated) DEVICE — MEDI-TRACE CADENCE ADULT, PRE-CONNECT  DEFIBRILLATION ELECTRODE (10 PR/PK) - PHYSIO-CONTROL: Brand: MEDI-TRACE CADENCE